# Patient Record
Sex: FEMALE | Race: WHITE | NOT HISPANIC OR LATINO
[De-identification: names, ages, dates, MRNs, and addresses within clinical notes are randomized per-mention and may not be internally consistent; named-entity substitution may affect disease eponyms.]

---

## 2017-05-08 ENCOUNTER — APPOINTMENT (OUTPATIENT)
Dept: OBGYN | Facility: CLINIC | Age: 37
End: 2017-05-08

## 2017-05-08 VITALS
DIASTOLIC BLOOD PRESSURE: 80 MMHG | BODY MASS INDEX: 31.37 KG/M2 | WEIGHT: 207 LBS | SYSTOLIC BLOOD PRESSURE: 139 MMHG | HEIGHT: 68 IN

## 2017-05-10 LAB
C TRACH RRNA SPEC QL NAA+PROBE: NORMAL
N GONORRHOEA RRNA SPEC QL NAA+PROBE: NORMAL
SOURCE AMPLIFICATION: NORMAL

## 2017-05-23 ENCOUNTER — APPOINTMENT (OUTPATIENT)
Dept: OBGYN | Facility: CLINIC | Age: 37
End: 2017-05-23

## 2017-05-23 VITALS
BODY MASS INDEX: 31.22 KG/M2 | HEIGHT: 68 IN | SYSTOLIC BLOOD PRESSURE: 122 MMHG | DIASTOLIC BLOOD PRESSURE: 75 MMHG | WEIGHT: 206 LBS

## 2017-05-24 LAB
BASOPHILS # BLD AUTO: 0.01 K/UL
BASOPHILS NFR BLD AUTO: 0.1 %
EOSINOPHIL # BLD AUTO: 0.15 K/UL
EOSINOPHIL NFR BLD AUTO: 1.4 %
HCT VFR BLD CALC: 41.9 %
HGB BLD-MCNC: 13.9 G/DL
HIV1+2 AB SPEC QL IA.RAPID: NONREACTIVE
IMM GRANULOCYTES NFR BLD AUTO: 0.3 %
LYMPHOCYTES # BLD AUTO: 3.18 K/UL
LYMPHOCYTES NFR BLD AUTO: 30.7 %
MAN DIFF?: NORMAL
MCHC RBC-ENTMCNC: 30 PG
MCHC RBC-ENTMCNC: 33.2 GM/DL
MCV RBC AUTO: 90.3 FL
MONOCYTES # BLD AUTO: 0.76 K/UL
MONOCYTES NFR BLD AUTO: 7.3 %
NEUTROPHILS # BLD AUTO: 6.24 K/UL
NEUTROPHILS NFR BLD AUTO: 60.2 %
PLATELET # BLD AUTO: 352 K/UL
RBC # BLD: 4.64 M/UL
RBC # FLD: 12.7 %
RUBV IGG FLD-ACNC: 1.2 INDEX
RUBV IGG SER-IMP: POSITIVE
WBC # FLD AUTO: 10.37 K/UL

## 2017-05-25 LAB
ABO + RH PNL BLD: NORMAL
BLD GP AB SCN SERPL QL: NORMAL
HBV SURFACE AG SER QL: NONREACTIVE
LEAD BLD-MCNC: 1 UG/DL
RPR SER QL: NORMAL

## 2017-05-26 LAB — BACTERIA UR CULT: ABNORMAL

## 2017-05-30 LAB
HGB A MFR BLD: 97.2 %
HGB A2 MFR BLD: 2.8 %
HGB FRACT BLD-IMP: NORMAL

## 2017-05-31 LAB
AR GENE MUT ANL BLD/T: NORMAL
CFTR MUT TESTED BLD/T: NORMAL
FMR1 GENE MUT ANL BLD/T: NORMAL

## 2017-06-13 ENCOUNTER — APPOINTMENT (OUTPATIENT)
Dept: OBGYN | Facility: CLINIC | Age: 37
End: 2017-06-13

## 2017-06-13 ENCOUNTER — APPOINTMENT (OUTPATIENT)
Dept: ANTEPARTUM | Facility: CLINIC | Age: 37
End: 2017-06-13

## 2017-06-13 VITALS
DIASTOLIC BLOOD PRESSURE: 74 MMHG | SYSTOLIC BLOOD PRESSURE: 129 MMHG | HEIGHT: 68 IN | BODY MASS INDEX: 31.22 KG/M2 | WEIGHT: 206 LBS

## 2017-06-27 ENCOUNTER — APPOINTMENT (OUTPATIENT)
Dept: PEDIATRIC MEDICAL GENETICS | Facility: CLINIC | Age: 37
End: 2017-06-27

## 2017-06-27 ENCOUNTER — LABORATORY RESULT (OUTPATIENT)
Age: 37
End: 2017-06-27

## 2017-07-17 ENCOUNTER — APPOINTMENT (OUTPATIENT)
Dept: OBGYN | Facility: CLINIC | Age: 37
End: 2017-07-17

## 2017-07-17 VITALS
SYSTOLIC BLOOD PRESSURE: 100 MMHG | HEIGHT: 68 IN | BODY MASS INDEX: 31.98 KG/M2 | WEIGHT: 211 LBS | DIASTOLIC BLOOD PRESSURE: 80 MMHG

## 2017-07-17 LAB — FETAL HEART DESCRIPTION: NORMAL

## 2017-07-25 LAB
1ST TRIMESTER DATA: NORMAL
2ND TRIMESTER DATA: NORMAL
AFP PNL SERPL: NORMAL
AFP SERPL-ACNC: NORMAL
AFP SERPL-ACNC: NORMAL
B-HCG FREE SERPL-MCNC: NORMAL
CLINICAL BIOCHEMIST REVIEW: NORMAL
FREE BETA HCG 1ST TRIMESTER: NORMAL
INHIBIN A SERPL-MCNC: NORMAL
NOTES NTD: NORMAL
NT: NORMAL
PAPP-A SERPL-ACNC: NORMAL
U ESTRIOL SERPL-SCNC: NORMAL

## 2017-08-07 ENCOUNTER — APPOINTMENT (OUTPATIENT)
Dept: OBGYN | Facility: CLINIC | Age: 37
End: 2017-08-07
Payer: COMMERCIAL

## 2017-08-07 ENCOUNTER — APPOINTMENT (OUTPATIENT)
Dept: ANTEPARTUM | Facility: CLINIC | Age: 37
End: 2017-08-07
Payer: COMMERCIAL

## 2017-08-07 ENCOUNTER — ASOB RESULT (OUTPATIENT)
Age: 37
End: 2017-08-07

## 2017-08-07 VITALS
SYSTOLIC BLOOD PRESSURE: 119 MMHG | DIASTOLIC BLOOD PRESSURE: 80 MMHG | WEIGHT: 223 LBS | BODY MASS INDEX: 33.8 KG/M2 | HEIGHT: 68 IN

## 2017-08-07 PROCEDURE — 0502F SUBSEQUENT PRENATAL CARE: CPT

## 2017-08-07 PROCEDURE — 76811 OB US DETAILED SNGL FETUS: CPT

## 2017-08-17 ENCOUNTER — APPOINTMENT (OUTPATIENT)
Dept: ANTEPARTUM | Facility: CLINIC | Age: 37
End: 2017-08-17
Payer: COMMERCIAL

## 2017-08-17 ENCOUNTER — ASOB RESULT (OUTPATIENT)
Age: 37
End: 2017-08-17

## 2017-08-17 PROCEDURE — 76816 OB US FOLLOW-UP PER FETUS: CPT

## 2017-09-06 ENCOUNTER — APPOINTMENT (OUTPATIENT)
Dept: OBGYN | Facility: CLINIC | Age: 37
End: 2017-09-06
Payer: COMMERCIAL

## 2017-09-06 ENCOUNTER — LABORATORY RESULT (OUTPATIENT)
Age: 37
End: 2017-09-06

## 2017-09-06 LAB
FETAL HEART DESCRIPTION: NORMAL
FETAL MOVEMENT: PRESENT

## 2017-09-06 PROCEDURE — 0502F SUBSEQUENT PRENATAL CARE: CPT

## 2017-09-07 LAB
BASOPHILS # BLD AUTO: 0.01 K/UL
BASOPHILS NFR BLD AUTO: 0.1 %
EOSINOPHIL # BLD AUTO: 0.11 K/UL
EOSINOPHIL NFR BLD AUTO: 0.8 %
GLUCOSE 1H P 50 G GLC PO SERPL-MCNC: 114 MG/DL
HCT VFR BLD CALC: 38 %
HGB BLD-MCNC: 12.5 G/DL
HIV1+2 AB SPEC QL IA.RAPID: NONREACTIVE
IMM GRANULOCYTES NFR BLD AUTO: 0.9 %
LYMPHOCYTES # BLD AUTO: 3.08 K/UL
LYMPHOCYTES NFR BLD AUTO: 22.4 %
MAN DIFF?: NORMAL
MCHC RBC-ENTMCNC: 30.7 PG
MCHC RBC-ENTMCNC: 32.9 GM/DL
MCV RBC AUTO: 93.4 FL
MONOCYTES # BLD AUTO: 0.92 K/UL
MONOCYTES NFR BLD AUTO: 6.7 %
NEUTROPHILS # BLD AUTO: 9.54 K/UL
NEUTROPHILS NFR BLD AUTO: 69.1 %
PLATELET # BLD AUTO: 278 K/UL
RBC # BLD: 4.07 M/UL
RBC # FLD: 13.1 %
WBC # FLD AUTO: 13.78 K/UL

## 2017-09-14 ENCOUNTER — TRANSCRIPTION ENCOUNTER (OUTPATIENT)
Age: 37
End: 2017-09-14

## 2017-09-18 ENCOUNTER — TRANSCRIPTION ENCOUNTER (OUTPATIENT)
Age: 37
End: 2017-09-18

## 2017-09-27 ENCOUNTER — APPOINTMENT (OUTPATIENT)
Dept: OBGYN | Facility: CLINIC | Age: 37
End: 2017-09-27
Payer: COMMERCIAL

## 2017-09-27 VITALS
WEIGHT: 230 LBS | BODY MASS INDEX: 34.86 KG/M2 | DIASTOLIC BLOOD PRESSURE: 75 MMHG | SYSTOLIC BLOOD PRESSURE: 135 MMHG | HEIGHT: 68 IN

## 2017-09-27 LAB
FETAL HEART DESCRIPTION: NORMAL
FETAL MOVEMENT: PRESENT

## 2017-09-27 PROCEDURE — 0502F SUBSEQUENT PRENATAL CARE: CPT

## 2017-09-27 PROCEDURE — 90471 IMMUNIZATION ADMIN: CPT

## 2017-09-27 PROCEDURE — 90686 IIV4 VACC NO PRSV 0.5 ML IM: CPT

## 2017-10-18 ENCOUNTER — APPOINTMENT (OUTPATIENT)
Dept: OBGYN | Facility: CLINIC | Age: 37
End: 2017-10-18
Payer: COMMERCIAL

## 2017-10-18 VITALS
BODY MASS INDEX: 35.31 KG/M2 | SYSTOLIC BLOOD PRESSURE: 128 MMHG | DIASTOLIC BLOOD PRESSURE: 79 MMHG | WEIGHT: 233 LBS | HEIGHT: 68 IN

## 2017-10-18 LAB
FETAL HEART DESCRIPTION: NORMAL
FETAL MOVEMENT: PRESENT

## 2017-10-18 PROCEDURE — 0502F SUBSEQUENT PRENATAL CARE: CPT

## 2017-11-01 ENCOUNTER — APPOINTMENT (OUTPATIENT)
Dept: OBGYN | Facility: CLINIC | Age: 37
End: 2017-11-01
Payer: COMMERCIAL

## 2017-11-01 VITALS
HEIGHT: 68 IN | SYSTOLIC BLOOD PRESSURE: 124 MMHG | WEIGHT: 235.5 LBS | DIASTOLIC BLOOD PRESSURE: 80 MMHG | BODY MASS INDEX: 35.69 KG/M2

## 2017-11-01 PROCEDURE — 90715 TDAP VACCINE 7 YRS/> IM: CPT

## 2017-11-01 PROCEDURE — 90471 IMMUNIZATION ADMIN: CPT

## 2017-11-01 PROCEDURE — 0502F SUBSEQUENT PRENATAL CARE: CPT

## 2017-11-15 ENCOUNTER — APPOINTMENT (OUTPATIENT)
Dept: OBGYN | Facility: CLINIC | Age: 37
End: 2017-11-15
Payer: COMMERCIAL

## 2017-11-15 VITALS
HEIGHT: 68 IN | WEIGHT: 239.25 LBS | SYSTOLIC BLOOD PRESSURE: 127 MMHG | BODY MASS INDEX: 36.26 KG/M2 | DIASTOLIC BLOOD PRESSURE: 78 MMHG

## 2017-11-15 LAB
FETAL HEART DESCRIPTION: NORMAL
FETAL MOVEMENT: PRESENT

## 2017-11-15 PROCEDURE — 0502F SUBSEQUENT PRENATAL CARE: CPT

## 2017-11-29 ENCOUNTER — ASOB RESULT (OUTPATIENT)
Age: 37
End: 2017-11-29

## 2017-11-29 ENCOUNTER — APPOINTMENT (OUTPATIENT)
Dept: ANTEPARTUM | Facility: CLINIC | Age: 37
End: 2017-11-29
Payer: COMMERCIAL

## 2017-11-29 ENCOUNTER — APPOINTMENT (OUTPATIENT)
Dept: OBGYN | Facility: CLINIC | Age: 37
End: 2017-11-29
Payer: COMMERCIAL

## 2017-11-29 VITALS
BODY MASS INDEX: 37.44 KG/M2 | DIASTOLIC BLOOD PRESSURE: 79 MMHG | SYSTOLIC BLOOD PRESSURE: 128 MMHG | HEIGHT: 68 IN | WEIGHT: 247 LBS

## 2017-11-29 LAB
ADDL FETAL HEART RATE: NORMAL
FETAL HEART DESCRIPTION: NORMAL
FETAL MOVEMENT: PRESENT
FETAL PRESENTATION: NORMAL

## 2017-11-29 PROCEDURE — 76816 OB US FOLLOW-UP PER FETUS: CPT

## 2017-11-29 PROCEDURE — 0502F SUBSEQUENT PRENATAL CARE: CPT

## 2017-12-04 LAB
GP B STREP DNA SPEC QL NAA+PROBE: NORMAL
GP B STREP DNA SPEC QL NAA+PROBE: NOT DETECTED
SOURCE GBS: NORMAL

## 2017-12-06 ENCOUNTER — APPOINTMENT (OUTPATIENT)
Dept: OBGYN | Facility: CLINIC | Age: 37
End: 2017-12-06
Payer: COMMERCIAL

## 2017-12-06 VITALS
HEIGHT: 68 IN | WEIGHT: 249 LBS | SYSTOLIC BLOOD PRESSURE: 130 MMHG | DIASTOLIC BLOOD PRESSURE: 84 MMHG | BODY MASS INDEX: 37.74 KG/M2

## 2017-12-06 LAB
FETAL HEART DESCRIPTION: NORMAL
FETAL MOVEMENT: PRESENT
FETAL PRESENTATION: NORMAL
OB COMMENTS: NORMAL

## 2017-12-06 PROCEDURE — 0502F SUBSEQUENT PRENATAL CARE: CPT

## 2017-12-12 ENCOUNTER — APPOINTMENT (OUTPATIENT)
Dept: OBGYN | Facility: CLINIC | Age: 37
End: 2017-12-12
Payer: COMMERCIAL

## 2017-12-12 VITALS
BODY MASS INDEX: 38.65 KG/M2 | SYSTOLIC BLOOD PRESSURE: 136 MMHG | HEIGHT: 68 IN | WEIGHT: 255 LBS | DIASTOLIC BLOOD PRESSURE: 79 MMHG

## 2017-12-12 PROCEDURE — 0502F SUBSEQUENT PRENATAL CARE: CPT

## 2017-12-20 ENCOUNTER — ASOB RESULT (OUTPATIENT)
Age: 37
End: 2017-12-20

## 2017-12-20 ENCOUNTER — APPOINTMENT (OUTPATIENT)
Dept: ANTEPARTUM | Facility: CLINIC | Age: 37
End: 2017-12-20
Payer: COMMERCIAL

## 2017-12-20 ENCOUNTER — APPOINTMENT (OUTPATIENT)
Dept: OBGYN | Facility: CLINIC | Age: 37
End: 2017-12-20
Payer: COMMERCIAL

## 2017-12-20 VITALS
SYSTOLIC BLOOD PRESSURE: 130 MMHG | WEIGHT: 259.38 LBS | BODY MASS INDEX: 39.31 KG/M2 | DIASTOLIC BLOOD PRESSURE: 82 MMHG | HEIGHT: 68 IN

## 2017-12-20 LAB
CERVICAL DILATION (CM): 0
CERVICAL EFFACEMENT (%): NORMAL
FETAL HEART DESCRIPTION: NORMAL
FETAL MOVEMENT: PRESENT
FETAL PRESENTATION: NORMAL
Lab: -2
OB COMMENTS: NORMAL

## 2017-12-20 PROCEDURE — 76816 OB US FOLLOW-UP PER FETUS: CPT

## 2017-12-20 PROCEDURE — 0502F SUBSEQUENT PRENATAL CARE: CPT

## 2017-12-26 ENCOUNTER — APPOINTMENT (OUTPATIENT)
Dept: OBGYN | Facility: CLINIC | Age: 37
End: 2017-12-26
Payer: COMMERCIAL

## 2017-12-26 ENCOUNTER — INPATIENT (INPATIENT)
Facility: HOSPITAL | Age: 37
LOS: 2 days | Discharge: HOME | End: 2017-12-29
Attending: OBSTETRICS & GYNECOLOGY | Admitting: OBSTETRICS & GYNECOLOGY
Payer: COMMERCIAL

## 2017-12-26 VITALS
DIASTOLIC BLOOD PRESSURE: 82 MMHG | BODY MASS INDEX: 39.74 KG/M2 | HEIGHT: 68 IN | WEIGHT: 262.25 LBS | SYSTOLIC BLOOD PRESSURE: 140 MMHG

## 2017-12-26 VITALS — HEIGHT: 68 IN | WEIGHT: 257.94 LBS

## 2017-12-26 DIAGNOSIS — Z34.80 ENCOUNTER FOR SUPERVISION OF OTHER NORMAL PREGNANCY, UNSPECIFIED TRIMESTER: ICD-10-CM

## 2017-12-26 DIAGNOSIS — Z3A.00 WEEKS OF GESTATION OF PREGNANCY NOT SPECIFIED: ICD-10-CM

## 2017-12-26 DIAGNOSIS — O26.899 OTHER SPECIFIED PREGNANCY RELATED CONDITIONS, UNSPECIFIED TRIMESTER: ICD-10-CM

## 2017-12-26 DIAGNOSIS — Z34.02 ENCOUNTER FOR SUPERVISION OF NORMAL FIRST PREGNANCY, SECOND TRIMESTER: ICD-10-CM

## 2017-12-26 LAB
ALBUMIN SERPL ELPH-MCNC: 2.7 G/DL — LOW (ref 3.3–5)
ALP SERPL-CCNC: 127 U/L — HIGH (ref 40–120)
ALT FLD-CCNC: 15 U/L RC — SIGNIFICANT CHANGE UP (ref 10–45)
ANION GAP SERPL CALC-SCNC: 13 MMOL/L — SIGNIFICANT CHANGE UP (ref 5–17)
APPEARANCE UR: ABNORMAL
APTT BLD: 24.3 SEC — LOW (ref 27.5–37.4)
AST SERPL-CCNC: 19 U/L — SIGNIFICANT CHANGE UP (ref 10–40)
BACTERIA # UR AUTO: NEGATIVE — SIGNIFICANT CHANGE UP
BASOPHILS # BLD AUTO: 0 K/UL — SIGNIFICANT CHANGE UP (ref 0–0.2)
BASOPHILS NFR BLD AUTO: 0.2 % — SIGNIFICANT CHANGE UP (ref 0–2)
BILIRUB SERPL-MCNC: 0.2 MG/DL — SIGNIFICANT CHANGE UP (ref 0.2–1.2)
BILIRUB UR-MCNC: NEGATIVE — SIGNIFICANT CHANGE UP
BLD GP AB SCN SERPL QL: NEGATIVE — SIGNIFICANT CHANGE UP
BUN SERPL-MCNC: 14 MG/DL — SIGNIFICANT CHANGE UP (ref 7–23)
CALCIUM SERPL-MCNC: 10 MG/DL — SIGNIFICANT CHANGE UP (ref 8.4–10.5)
CHLORIDE SERPL-SCNC: 102 MMOL/L — SIGNIFICANT CHANGE UP (ref 96–108)
CO2 SERPL-SCNC: 20 MMOL/L — LOW (ref 22–31)
COLOR SPEC: YELLOW — SIGNIFICANT CHANGE UP
CREAT SERPL-MCNC: 0.87 MG/DL — SIGNIFICANT CHANGE UP (ref 0.5–1.3)
DIFF PNL FLD: NEGATIVE — SIGNIFICANT CHANGE UP
EOSINOPHIL # BLD AUTO: 0.1 K/UL — SIGNIFICANT CHANGE UP (ref 0–0.5)
EOSINOPHIL NFR BLD AUTO: 0.6 % — SIGNIFICANT CHANGE UP (ref 0–6)
EPI CELLS # UR: 4 /HPF — SIGNIFICANT CHANGE UP (ref 0–5)
FIBRINOGEN PPP-MCNC: 601 MG/DL — HIGH (ref 310–510)
GLUCOSE SERPL-MCNC: 139 MG/DL — HIGH (ref 70–99)
GLUCOSE UR QL: NEGATIVE MG/DL — SIGNIFICANT CHANGE UP
HCT VFR BLD CALC: 36 % — SIGNIFICANT CHANGE UP (ref 34.5–45)
HGB BLD-MCNC: 12.6 G/DL — SIGNIFICANT CHANGE UP (ref 11.5–15.5)
INR BLD: 0.88 RATIO — SIGNIFICANT CHANGE UP (ref 0.88–1.16)
KETONES UR-MCNC: NEGATIVE — SIGNIFICANT CHANGE UP
LDH SERPL L TO P-CCNC: 210 U/L — SIGNIFICANT CHANGE UP (ref 50–242)
LDH SERPL L TO P-CCNC: 283 U/L — HIGH (ref 50–242)
LEUKOCYTE ESTERASE UR-ACNC: NEGATIVE — SIGNIFICANT CHANGE UP
LYMPHOCYTES # BLD AUTO: 18.9 % — SIGNIFICANT CHANGE UP (ref 13–44)
LYMPHOCYTES # BLD AUTO: 2.8 K/UL — SIGNIFICANT CHANGE UP (ref 1–3.3)
MCHC RBC-ENTMCNC: 31.7 PG — SIGNIFICANT CHANGE UP (ref 27–34)
MCHC RBC-ENTMCNC: 35 GM/DL — SIGNIFICANT CHANGE UP (ref 32–36)
MCV RBC AUTO: 90.6 FL — SIGNIFICANT CHANGE UP (ref 80–100)
MONOCYTES # BLD AUTO: 0.7 K/UL — SIGNIFICANT CHANGE UP (ref 0–0.9)
MONOCYTES NFR BLD AUTO: 5 % — SIGNIFICANT CHANGE UP (ref 2–14)
NEUTROPHILS # BLD AUTO: 11.1 K/UL — HIGH (ref 1.8–7.4)
NEUTROPHILS NFR BLD AUTO: 75.3 % — SIGNIFICANT CHANGE UP (ref 43–77)
NITRITE UR-MCNC: NEGATIVE — SIGNIFICANT CHANGE UP
PH UR: 6 — SIGNIFICANT CHANGE UP (ref 5–8)
PLATELET # BLD AUTO: 221 K/UL — SIGNIFICANT CHANGE UP (ref 150–400)
POTASSIUM SERPL-MCNC: 3.9 MMOL/L — SIGNIFICANT CHANGE UP (ref 3.5–5.3)
POTASSIUM SERPL-SCNC: 3.9 MMOL/L — SIGNIFICANT CHANGE UP (ref 3.5–5.3)
PROT SERPL-MCNC: 5.9 G/DL — LOW (ref 6–8.3)
PROT UR-MCNC: ABNORMAL MG/DL
PROTHROM AB SERPL-ACNC: 9.6 SEC — LOW (ref 9.8–12.7)
RBC # BLD: 3.97 M/UL — SIGNIFICANT CHANGE UP (ref 3.8–5.2)
RBC # FLD: 12.1 % — SIGNIFICANT CHANGE UP (ref 10.3–14.5)
RBC CASTS # UR COMP ASSIST: 2 /HPF — SIGNIFICANT CHANGE UP (ref 0–4)
RH IG SCN BLD-IMP: POSITIVE — SIGNIFICANT CHANGE UP
RH IG SCN BLD-IMP: POSITIVE — SIGNIFICANT CHANGE UP
SODIUM SERPL-SCNC: 135 MMOL/L — SIGNIFICANT CHANGE UP (ref 135–145)
SP GR SPEC: 1.02 — SIGNIFICANT CHANGE UP (ref 1.01–1.02)
URATE SERPL-MCNC: 5.7 MG/DL — SIGNIFICANT CHANGE UP (ref 2.5–7)
UROBILINOGEN FLD QL: NEGATIVE MG/DL — SIGNIFICANT CHANGE UP
WBC # BLD: 14.7 K/UL — HIGH (ref 3.8–10.5)
WBC # FLD AUTO: 14.7 K/UL — HIGH (ref 3.8–10.5)
WBC UR QL: 2 /HPF — SIGNIFICANT CHANGE UP (ref 0–5)

## 2017-12-26 PROCEDURE — 0502F SUBSEQUENT PRENATAL CARE: CPT

## 2017-12-26 RX ORDER — CITRIC ACID/SODIUM CITRATE 300-500 MG
15 SOLUTION, ORAL ORAL EVERY 4 HOURS
Qty: 0 | Refills: 0 | Status: DISCONTINUED | OUTPATIENT
Start: 2017-12-26 | End: 2017-12-28

## 2017-12-26 RX ORDER — SODIUM CHLORIDE 9 MG/ML
1000 INJECTION, SOLUTION INTRAVENOUS
Qty: 0 | Refills: 0 | Status: DISCONTINUED | OUTPATIENT
Start: 2017-12-26 | End: 2017-12-28

## 2017-12-26 RX ORDER — OXYTOCIN 10 UNIT/ML
333.33 VIAL (ML) INJECTION
Qty: 20 | Refills: 0 | Status: DISCONTINUED | OUTPATIENT
Start: 2017-12-26 | End: 2017-12-28

## 2017-12-26 RX ORDER — SODIUM CHLORIDE 9 MG/ML
500 INJECTION, SOLUTION INTRAVENOUS ONCE
Qty: 0 | Refills: 0 | Status: COMPLETED | OUTPATIENT
Start: 2017-12-26 | End: 2017-12-26

## 2017-12-26 RX ADMIN — SODIUM CHLORIDE 1000 MILLILITER(S): 9 INJECTION, SOLUTION INTRAVENOUS at 18:44

## 2017-12-26 RX ADMIN — SODIUM CHLORIDE 125 MILLILITER(S): 9 INJECTION, SOLUTION INTRAVENOUS at 19:30

## 2017-12-27 ENCOUNTER — RESULT REVIEW (OUTPATIENT)
Age: 37
End: 2017-12-27

## 2017-12-27 ENCOUNTER — TRANSCRIPTION ENCOUNTER (OUTPATIENT)
Age: 37
End: 2017-12-27

## 2017-12-27 LAB
ALBUMIN SERPL ELPH-MCNC: 3 G/DL — LOW (ref 3.3–5)
ALP SERPL-CCNC: 133 U/L — HIGH (ref 40–120)
ALT FLD-CCNC: 15 U/L RC — SIGNIFICANT CHANGE UP (ref 10–45)
ANION GAP SERPL CALC-SCNC: 13 MMOL/L — SIGNIFICANT CHANGE UP (ref 5–17)
APTT BLD: 25.1 SEC — LOW (ref 27.5–37.4)
AST SERPL-CCNC: 18 U/L — SIGNIFICANT CHANGE UP (ref 10–40)
BASOPHILS # BLD AUTO: 0.1 K/UL — SIGNIFICANT CHANGE UP (ref 0–0.2)
BASOPHILS NFR BLD AUTO: 0.4 % — SIGNIFICANT CHANGE UP (ref 0–2)
BILIRUB SERPL-MCNC: 0.3 MG/DL — SIGNIFICANT CHANGE UP (ref 0.2–1.2)
BUN SERPL-MCNC: 13 MG/DL — SIGNIFICANT CHANGE UP (ref 7–23)
CALCIUM SERPL-MCNC: 9.5 MG/DL — SIGNIFICANT CHANGE UP (ref 8.4–10.5)
CHLORIDE SERPL-SCNC: 103 MMOL/L — SIGNIFICANT CHANGE UP (ref 96–108)
CO2 SERPL-SCNC: 21 MMOL/L — LOW (ref 22–31)
CREAT ?TM UR-MCNC: 83 MG/DL — SIGNIFICANT CHANGE UP
CREAT SERPL-MCNC: 0.81 MG/DL — SIGNIFICANT CHANGE UP (ref 0.5–1.3)
EOSINOPHIL # BLD AUTO: 0.2 K/UL — SIGNIFICANT CHANGE UP (ref 0–0.5)
EOSINOPHIL NFR BLD AUTO: 1.1 % — SIGNIFICANT CHANGE UP (ref 0–6)
FIBRINOGEN PPP-MCNC: 640 MG/DL — HIGH (ref 310–510)
GLUCOSE SERPL-MCNC: 86 MG/DL — SIGNIFICANT CHANGE UP (ref 70–99)
HCT VFR BLD CALC: 36.8 % — SIGNIFICANT CHANGE UP (ref 34.5–45)
HGB BLD-MCNC: 13 G/DL — SIGNIFICANT CHANGE UP (ref 11.5–15.5)
INR BLD: 0.91 RATIO — SIGNIFICANT CHANGE UP (ref 0.88–1.16)
LYMPHOCYTES # BLD AUTO: 28.2 % — SIGNIFICANT CHANGE UP (ref 13–44)
LYMPHOCYTES # BLD AUTO: 4.4 K/UL — HIGH (ref 1–3.3)
MCHC RBC-ENTMCNC: 32.2 PG — SIGNIFICANT CHANGE UP (ref 27–34)
MCHC RBC-ENTMCNC: 35.4 GM/DL — SIGNIFICANT CHANGE UP (ref 32–36)
MCV RBC AUTO: 91.1 FL — SIGNIFICANT CHANGE UP (ref 80–100)
MONOCYTES # BLD AUTO: 1.1 K/UL — HIGH (ref 0–0.9)
MONOCYTES NFR BLD AUTO: 6.7 % — SIGNIFICANT CHANGE UP (ref 2–14)
NEUTROPHILS # BLD AUTO: 10 K/UL — HIGH (ref 1.8–7.4)
NEUTROPHILS NFR BLD AUTO: 63.6 % — SIGNIFICANT CHANGE UP (ref 43–77)
PLATELET # BLD AUTO: 235 K/UL — SIGNIFICANT CHANGE UP (ref 150–400)
POTASSIUM SERPL-MCNC: 3.8 MMOL/L — SIGNIFICANT CHANGE UP (ref 3.5–5.3)
POTASSIUM SERPL-SCNC: 3.8 MMOL/L — SIGNIFICANT CHANGE UP (ref 3.5–5.3)
PROT ?TM UR-MCNC: 18 MG/DL — HIGH (ref 0–12)
PROT SERPL-MCNC: 6.1 G/DL — SIGNIFICANT CHANGE UP (ref 6–8.3)
PROT/CREAT UR-RTO: 0.2 RATIO — SIGNIFICANT CHANGE UP (ref 0–0.2)
PROTHROM AB SERPL-ACNC: 9.9 SEC — SIGNIFICANT CHANGE UP (ref 9.8–12.7)
RBC # BLD: 4.04 M/UL — SIGNIFICANT CHANGE UP (ref 3.8–5.2)
RBC # FLD: 12.4 % — SIGNIFICANT CHANGE UP (ref 10.3–14.5)
SODIUM SERPL-SCNC: 137 MMOL/L — SIGNIFICANT CHANGE UP (ref 135–145)
T PALLIDUM AB TITR SER: NEGATIVE — SIGNIFICANT CHANGE UP
URATE SERPL-MCNC: 6.1 MG/DL — SIGNIFICANT CHANGE UP (ref 2.5–7)
WBC # BLD: 15.7 K/UL — HIGH (ref 3.8–10.5)
WBC # FLD AUTO: 15.7 K/UL — HIGH (ref 3.8–10.5)

## 2017-12-27 PROCEDURE — 59400 OBSTETRICAL CARE: CPT

## 2017-12-27 RX ORDER — DIBUCAINE 1 %
1 OINTMENT (GRAM) RECTAL EVERY 4 HOURS
Qty: 0 | Refills: 0 | Status: DISCONTINUED | OUTPATIENT
Start: 2017-12-28 | End: 2017-12-29

## 2017-12-27 RX ORDER — OXYCODONE HYDROCHLORIDE 5 MG/1
5 TABLET ORAL EVERY 4 HOURS
Qty: 0 | Refills: 0 | Status: DISCONTINUED | OUTPATIENT
Start: 2017-12-27 | End: 2017-12-29

## 2017-12-27 RX ORDER — DIBUCAINE 1 %
1 OINTMENT (GRAM) RECTAL EVERY 4 HOURS
Qty: 0 | Refills: 0 | Status: DISCONTINUED | OUTPATIENT
Start: 2017-12-27 | End: 2017-12-28

## 2017-12-27 RX ORDER — ACETAMINOPHEN 500 MG
975 TABLET ORAL EVERY 6 HOURS
Qty: 0 | Refills: 0 | Status: COMPLETED | OUTPATIENT
Start: 2017-12-27 | End: 2018-11-25

## 2017-12-27 RX ORDER — OXYTOCIN 10 UNIT/ML
4 VIAL (ML) INJECTION
Qty: 30 | Refills: 0 | Status: DISCONTINUED | OUTPATIENT
Start: 2017-12-27 | End: 2017-12-27

## 2017-12-27 RX ORDER — OXYTOCIN 10 UNIT/ML
41.67 VIAL (ML) INJECTION
Qty: 20 | Refills: 0 | Status: DISCONTINUED | OUTPATIENT
Start: 2017-12-28 | End: 2017-12-29

## 2017-12-27 RX ORDER — AER TRAVELER 0.5 G/1
1 SOLUTION RECTAL; TOPICAL EVERY 4 HOURS
Qty: 0 | Refills: 0 | Status: DISCONTINUED | OUTPATIENT
Start: 2017-12-27 | End: 2017-12-28

## 2017-12-27 RX ORDER — PRAMOXINE HYDROCHLORIDE 150 MG/15G
1 AEROSOL, FOAM RECTAL EVERY 4 HOURS
Qty: 0 | Refills: 0 | Status: DISCONTINUED | OUTPATIENT
Start: 2017-12-27 | End: 2017-12-28

## 2017-12-27 RX ORDER — SODIUM CHLORIDE 9 MG/ML
1000 INJECTION, SOLUTION INTRAVENOUS
Qty: 0 | Refills: 0 | Status: DISCONTINUED | OUTPATIENT
Start: 2017-12-27 | End: 2017-12-29

## 2017-12-27 RX ORDER — OXYTOCIN 10 UNIT/ML
41.67 VIAL (ML) INJECTION
Qty: 20 | Refills: 0 | Status: DISCONTINUED | OUTPATIENT
Start: 2017-12-27 | End: 2017-12-28

## 2017-12-27 RX ORDER — TETANUS TOXOID, REDUCED DIPHTHERIA TOXOID AND ACELLULAR PERTUSSIS VACCINE, ADSORBED 5; 2.5; 8; 8; 2.5 [IU]/.5ML; [IU]/.5ML; UG/.5ML; UG/.5ML; UG/.5ML
0.5 SUSPENSION INTRAMUSCULAR ONCE
Qty: 0 | Refills: 0 | Status: DISCONTINUED | OUTPATIENT
Start: 2017-12-28 | End: 2017-12-29

## 2017-12-27 RX ORDER — PRAMOXINE HYDROCHLORIDE 150 MG/15G
1 AEROSOL, FOAM RECTAL EVERY 4 HOURS
Qty: 0 | Refills: 0 | Status: DISCONTINUED | OUTPATIENT
Start: 2017-12-28 | End: 2017-12-29

## 2017-12-27 RX ORDER — SODIUM CHLORIDE 9 MG/ML
3 INJECTION INTRAMUSCULAR; INTRAVENOUS; SUBCUTANEOUS EVERY 8 HOURS
Qty: 0 | Refills: 0 | Status: DISCONTINUED | OUTPATIENT
Start: 2017-12-27 | End: 2017-12-28

## 2017-12-27 RX ORDER — DIPHENHYDRAMINE HCL 50 MG
25 CAPSULE ORAL EVERY 6 HOURS
Qty: 0 | Refills: 0 | Status: DISCONTINUED | OUTPATIENT
Start: 2017-12-28 | End: 2017-12-29

## 2017-12-27 RX ORDER — DOCUSATE SODIUM 100 MG
100 CAPSULE ORAL
Qty: 0 | Refills: 0 | Status: DISCONTINUED | OUTPATIENT
Start: 2017-12-28 | End: 2017-12-29

## 2017-12-27 RX ORDER — KETOROLAC TROMETHAMINE 30 MG/ML
30 SYRINGE (ML) INJECTION ONCE
Qty: 0 | Refills: 0 | Status: DISCONTINUED | OUTPATIENT
Start: 2017-12-27 | End: 2017-12-28

## 2017-12-27 RX ORDER — LABETALOL HCL 100 MG
20 TABLET ORAL ONCE
Qty: 0 | Refills: 0 | Status: DISCONTINUED | OUTPATIENT
Start: 2017-12-27 | End: 2017-12-29

## 2017-12-27 RX ORDER — IBUPROFEN 200 MG
600 TABLET ORAL EVERY 6 HOURS
Qty: 0 | Refills: 0 | Status: COMPLETED | OUTPATIENT
Start: 2017-12-27 | End: 2018-11-25

## 2017-12-27 RX ORDER — SIMETHICONE 80 MG/1
80 TABLET, CHEWABLE ORAL EVERY 6 HOURS
Qty: 0 | Refills: 0 | Status: DISCONTINUED | OUTPATIENT
Start: 2017-12-28 | End: 2017-12-29

## 2017-12-27 RX ORDER — MAGNESIUM HYDROXIDE 400 MG/1
30 TABLET, CHEWABLE ORAL
Qty: 0 | Refills: 0 | Status: DISCONTINUED | OUTPATIENT
Start: 2017-12-28 | End: 2017-12-29

## 2017-12-27 RX ORDER — OXYCODONE HYDROCHLORIDE 5 MG/1
5 TABLET ORAL
Qty: 0 | Refills: 0 | Status: DISCONTINUED | OUTPATIENT
Start: 2017-12-27 | End: 2017-12-29

## 2017-12-27 RX ORDER — OXYTOCIN 10 UNIT/ML
4 VIAL (ML) INJECTION
Qty: 30 | Refills: 0 | Status: DISCONTINUED | OUTPATIENT
Start: 2017-12-27 | End: 2017-12-29

## 2017-12-27 RX ORDER — LANOLIN
1 OINTMENT (GRAM) TOPICAL EVERY 6 HOURS
Qty: 0 | Refills: 0 | Status: DISCONTINUED | OUTPATIENT
Start: 2017-12-28 | End: 2017-12-29

## 2017-12-27 RX ORDER — AER TRAVELER 0.5 G/1
1 SOLUTION RECTAL; TOPICAL EVERY 4 HOURS
Qty: 0 | Refills: 0 | Status: DISCONTINUED | OUTPATIENT
Start: 2017-12-28 | End: 2017-12-29

## 2017-12-27 RX ORDER — SODIUM CHLORIDE 9 MG/ML
3 INJECTION INTRAMUSCULAR; INTRAVENOUS; SUBCUTANEOUS EVERY 8 HOURS
Qty: 0 | Refills: 0 | Status: DISCONTINUED | OUTPATIENT
Start: 2017-12-28 | End: 2017-12-29

## 2017-12-27 RX ORDER — HYDROCORTISONE 1 %
1 OINTMENT (GRAM) TOPICAL EVERY 4 HOURS
Qty: 0 | Refills: 0 | Status: DISCONTINUED | OUTPATIENT
Start: 2017-12-28 | End: 2017-12-29

## 2017-12-27 RX ORDER — GLYCERIN ADULT
1 SUPPOSITORY, RECTAL RECTAL AT BEDTIME
Qty: 0 | Refills: 0 | Status: DISCONTINUED | OUTPATIENT
Start: 2017-12-28 | End: 2017-12-29

## 2017-12-27 RX ORDER — HYDROCORTISONE 1 %
1 OINTMENT (GRAM) TOPICAL EVERY 4 HOURS
Qty: 0 | Refills: 0 | Status: DISCONTINUED | OUTPATIENT
Start: 2017-12-27 | End: 2017-12-28

## 2017-12-27 RX ADMIN — Medication 4 MILLIUNIT(S)/MIN: at 12:30

## 2017-12-28 LAB
BASOPHILS # BLD AUTO: 0 K/UL — SIGNIFICANT CHANGE UP (ref 0–0.2)
BASOPHILS NFR BLD AUTO: 0.1 % — SIGNIFICANT CHANGE UP (ref 0–2)
EOSINOPHIL # BLD AUTO: 0 K/UL — SIGNIFICANT CHANGE UP (ref 0–0.5)
EOSINOPHIL NFR BLD AUTO: 0.1 % — SIGNIFICANT CHANGE UP (ref 0–6)
GIANT PLATELETS BLD QL SMEAR: PRESENT — SIGNIFICANT CHANGE UP
HCT VFR BLD CALC: 31.3 % — LOW (ref 34.5–45)
HCT VFR BLD CALC: 34.8 % — SIGNIFICANT CHANGE UP (ref 34.5–45)
HGB BLD-MCNC: 10.8 G/DL — LOW (ref 11.5–15.5)
HGB BLD-MCNC: 11.7 G/DL — SIGNIFICANT CHANGE UP (ref 11.5–15.5)
LYMPHOCYTES # BLD AUTO: 3 K/UL — SIGNIFICANT CHANGE UP (ref 1–3.3)
LYMPHOCYTES # BLD AUTO: 9.5 % — LOW (ref 13–44)
MCHC RBC-ENTMCNC: 30.7 PG — SIGNIFICANT CHANGE UP (ref 27–34)
MCHC RBC-ENTMCNC: 31.4 PG — SIGNIFICANT CHANGE UP (ref 27–34)
MCHC RBC-ENTMCNC: 33.5 GM/DL — SIGNIFICANT CHANGE UP (ref 32–36)
MCHC RBC-ENTMCNC: 34.5 GM/DL — SIGNIFICANT CHANGE UP (ref 32–36)
MCV RBC AUTO: 91.2 FL — SIGNIFICANT CHANGE UP (ref 80–100)
MCV RBC AUTO: 91.6 FL — SIGNIFICANT CHANGE UP (ref 80–100)
MONOCYTES # BLD AUTO: 1.6 K/UL — HIGH (ref 0–0.9)
MONOCYTES NFR BLD AUTO: 5.1 % — SIGNIFICANT CHANGE UP (ref 2–14)
NEUTROPHILS # BLD AUTO: 26.8 K/UL — HIGH (ref 1.8–7.4)
NEUTROPHILS NFR BLD AUTO: 85.3 % — HIGH (ref 43–77)
PLAT MORPH BLD: ABNORMAL
PLATELET # BLD AUTO: 219 K/UL — SIGNIFICANT CHANGE UP (ref 150–400)
PLATELET # BLD AUTO: 292 K/UL — SIGNIFICANT CHANGE UP (ref 150–400)
RBC # BLD: 3.43 M/UL — LOW (ref 3.8–5.2)
RBC # BLD: 3.81 M/UL — SIGNIFICANT CHANGE UP (ref 3.8–5.2)
RBC # FLD: 12.5 % — SIGNIFICANT CHANGE UP (ref 10.3–14.5)
RBC # FLD: 12.5 % — SIGNIFICANT CHANGE UP (ref 10.3–14.5)
RBC BLD AUTO: SIGNIFICANT CHANGE UP
WBC # BLD: 25.5 K/UL — HIGH (ref 3.8–10.5)
WBC # BLD: 31.4 K/UL — HIGH (ref 3.8–10.5)
WBC # FLD AUTO: 25.5 K/UL — HIGH (ref 3.8–10.5)
WBC # FLD AUTO: 31.4 K/UL — HIGH (ref 3.8–10.5)

## 2017-12-28 RX ORDER — IBUPROFEN 200 MG
600 TABLET ORAL EVERY 6 HOURS
Qty: 0 | Refills: 0 | Status: DISCONTINUED | OUTPATIENT
Start: 2017-12-28 | End: 2017-12-29

## 2017-12-28 RX ORDER — ACETAMINOPHEN 500 MG
975 TABLET ORAL EVERY 6 HOURS
Qty: 0 | Refills: 0 | Status: DISCONTINUED | OUTPATIENT
Start: 2017-12-28 | End: 2017-12-29

## 2017-12-28 RX ADMIN — Medication 30 MILLIGRAM(S): at 01:02

## 2017-12-28 RX ADMIN — Medication 600 MILLIGRAM(S): at 18:10

## 2017-12-28 RX ADMIN — Medication 125 MILLIUNIT(S)/MIN: at 01:24

## 2017-12-28 RX ADMIN — Medication 600 MILLIGRAM(S): at 13:07

## 2017-12-28 RX ADMIN — Medication 600 MILLIGRAM(S): at 17:40

## 2017-12-28 RX ADMIN — Medication 600 MILLIGRAM(S): at 13:30

## 2017-12-28 RX ADMIN — Medication 1 TABLET(S): at 11:15

## 2017-12-28 NOTE — PROGRESS NOTE ADULT - PROBLEM SELECTOR PLAN 1
- Pain well controlled, continue current pain regimen  - Increase ambulation, SCDs when not ambulating  - Continue regular diet    Lisa Gonzalez D.O. PGY1

## 2017-12-28 NOTE — LACTATION INITIAL EVALUATION - LACTATION INTERVENTIONS
initiate skin to skin/initiate dual electric pump routine/initiate hand expression routine/fullterm breastfeeding guidelines reviewed. supplementation with EHM encouraged.

## 2017-12-28 NOTE — PROGRESS NOTE ADULT - SUBJECTIVE AND OBJECTIVE BOX
OB Progress Note:  PPD#1    S: 38yo PPD#1 s/p VAVD. Patient feels well. Pain is well controlled. She is tolerating a regular diet and not yet passing flatus. She is voiding spontaneously, and ambulating. Denies CP/SOB. Denies lightheadedness/dizziness. Denies N/V.    O:  Vitals:  Vital Signs Last 24 Hrs  T(C): 36.4 (28 Dec 2017 03:07), Max: 36.6 (28 Dec 2017 00:30)  T(F): 97.5 (28 Dec 2017 03:07), Max: 97.9 (28 Dec 2017 00:30)  HR: 86 (28 Dec 2017 03:07) (86 - 108)  BP: 132/82 (28 Dec 2017 03:07) (120/59 - 143/63)  BP(mean): --  RR: 18 (28 Dec 2017 03:07) (17 - 18)  SpO2: 95% (28 Dec 2017 02:30) (94% - 97%)    MEDICATIONS  (STANDING):  acetaminophen   Tablet. 975 milliGRAM(s) Oral every 6 hours  dextrose 5% + lactated ringers. 1000 milliLiter(s) (125 mL/Hr) IV Continuous <Continuous>  diphtheria/tetanus/pertussis (acellular) Vaccine (ADAcel) 0.5 milliLiter(s) IntraMuscular once  ibuprofen  Tablet 600 milliGRAM(s) Oral every 6 hours  labetalol Injectable 20 milliGRAM(s) IV Push once  misoprostol Oral Solution 60 MICROGram(s) Oral every 2 hours  oxyCODONE    IR 5 milliGRAM(s) Oral every 3 hours  oxytocin Infusion 41.667 milliUNIT(s)/Min (125 mL/Hr) IV Continuous <Continuous>  oxytocin Infusion 4 milliUNIT(s)/Min (4 mL/Hr) IV Continuous <Continuous>  prenatal multivitamin 1 Tablet(s) Oral daily  sodium chloride 0.9% lock flush 3 milliLiter(s) IV Push every 8 hours      Labs:  Blood type: O Positive  Rubella IgG: RPR: Negative                          13.0   15.7<H> >-----------< 235    ( 27 @ 03:59 )             36.8                        12.6   14.7<H> >-----------< 221    ( 1226 @ 17:14 )             36.0    17 @ 03:59      137  |  103  |  13  ----------------------------<  86  3.8   |  21<L>  |  0.81    17 @ 17:14      135  |  102  |  14  ----------------------------<  139<H>  3.9   |  20<L>  |  0.87        Ca    9.5      27 Dec 2017 03:59  Ca    10.0      26 Dec 2017 17:14    TPro  6.1  /  Alb  3.0<L>  /  TBili  0.3  /  DBili  x   /  AST  18  /  ALT  15  /  AlkPhos  133<H>  17 @ 03:59  TPro  5.9<L>  /  Alb  2.7<L>  /  TBili  0.2  /  DBili  x   /  AST  19  /  ALT  15  /  AlkPhos  127<H>  17 @ 17:14          Physical Exam:  General: NAD  Abdomen: soft, non-tender, non-distended, fundus firm  Vaginal: Lochia wnl  Extremities: NTBL

## 2017-12-28 NOTE — LACTATION INITIAL EVALUATION - INTERVENTION OUTCOME
verbalizes understanding/demonstrates understanding of teaching/good return demonstration/needs met/LC team to follow

## 2017-12-28 NOTE — PROGRESS NOTE ADULT - ATTENDING COMMENTS
Patient seen and examined.  No problems   Will check WBC later this afternoon to see trend  No signs of fever

## 2017-12-29 ENCOUNTER — TRANSCRIPTION ENCOUNTER (OUTPATIENT)
Age: 37
End: 2017-12-29

## 2017-12-29 VITALS
SYSTOLIC BLOOD PRESSURE: 139 MMHG | RESPIRATION RATE: 18 BRPM | TEMPERATURE: 98 F | DIASTOLIC BLOOD PRESSURE: 78 MMHG | HEART RATE: 80 BPM

## 2017-12-29 PROCEDURE — 85384 FIBRINOGEN ACTIVITY: CPT

## 2017-12-29 PROCEDURE — 81001 URINALYSIS AUTO W/SCOPE: CPT

## 2017-12-29 PROCEDURE — G0463: CPT

## 2017-12-29 PROCEDURE — 86901 BLOOD TYPING SEROLOGIC RH(D): CPT

## 2017-12-29 PROCEDURE — 84156 ASSAY OF PROTEIN URINE: CPT

## 2017-12-29 PROCEDURE — 86900 BLOOD TYPING SEROLOGIC ABO: CPT

## 2017-12-29 PROCEDURE — 85730 THROMBOPLASTIN TIME PARTIAL: CPT

## 2017-12-29 PROCEDURE — 85610 PROTHROMBIN TIME: CPT

## 2017-12-29 PROCEDURE — 86850 RBC ANTIBODY SCREEN: CPT

## 2017-12-29 PROCEDURE — 59025 FETAL NON-STRESS TEST: CPT

## 2017-12-29 PROCEDURE — 86780 TREPONEMA PALLIDUM: CPT

## 2017-12-29 PROCEDURE — 80053 COMPREHEN METABOLIC PANEL: CPT

## 2017-12-29 PROCEDURE — 84550 ASSAY OF BLOOD/URIC ACID: CPT

## 2017-12-29 PROCEDURE — 83615 LACTATE (LD) (LDH) ENZYME: CPT

## 2017-12-29 PROCEDURE — 59050 FETAL MONITOR W/REPORT: CPT

## 2017-12-29 PROCEDURE — 85027 COMPLETE CBC AUTOMATED: CPT

## 2017-12-29 RX ORDER — ACETAMINOPHEN 500 MG
3 TABLET ORAL
Qty: 0 | Refills: 0 | DISCHARGE
Start: 2017-12-29

## 2017-12-29 RX ORDER — IBUPROFEN 200 MG
1 TABLET ORAL
Qty: 0 | Refills: 0 | DISCHARGE
Start: 2017-12-29

## 2017-12-29 RX ADMIN — Medication 600 MILLIGRAM(S): at 11:44

## 2017-12-29 RX ADMIN — Medication 1 TABLET(S): at 11:44

## 2017-12-29 RX ADMIN — Medication 600 MILLIGRAM(S): at 17:16

## 2017-12-29 RX ADMIN — Medication 600 MILLIGRAM(S): at 06:01

## 2017-12-29 RX ADMIN — Medication 600 MILLIGRAM(S): at 05:31

## 2017-12-29 RX ADMIN — Medication 600 MILLIGRAM(S): at 12:30

## 2017-12-29 NOTE — DISCHARGE NOTE OB - CARE PLAN
Principal Discharge DX:	Vacuum extractor delivery, delivered  Goal:	recovery  Instructions for follow-up, activity and diet:	Nothing in vagina x 6 weeks

## 2017-12-29 NOTE — DISCHARGE NOTE OB - CARE PROVIDER_API CALL
Celina Wilson (MD), Obstetrics and Gynecology  [address] [place] [address]  Phone: [phone]  Fax: [phone]

## 2017-12-29 NOTE — DISCHARGE NOTE OB - MEDICATION SUMMARY - MEDICATIONS TO TAKE
I will START or STAY ON the medications listed below when I get home from the hospital:    acetaminophen 325 mg oral tablet  -- 3 tab(s) by mouth every 6 hours  -- Indication: For pain    ibuprofen 600 mg oral tablet  -- 1 tab(s) by mouth every 6 hours  -- Indication: For pain    Prenatal Multivitamins with Folic Acid 1 mg oral tablet  -- 1 tab(s) by mouth once a day  -- Indication: For breastfeeding

## 2017-12-29 NOTE — DISCHARGE NOTE OB - PATIENT PORTAL LINK FT
“You can access the Follow[employer] Patient Portal, offered by [facility], by registering with the following website: [email or web address]”

## 2017-12-29 NOTE — PROGRESS NOTE ADULT - PROBLEM SELECTOR PLAN 1
- Pain well controlled, continue current pain regimen  - Increase ambulation, SCDs when not ambulating  - Continue regular diet    Kenna Peres PGY-1

## 2017-12-29 NOTE — PROGRESS NOTE ADULT - SUBJECTIVE AND OBJECTIVE BOX
OB Progress Note: VAVD PPD#2    S: 38yo PPD#2 s/p VAVD. Patient feels well. Pain is well controlled. She is tolerating a regular diet and passing flatus. She is voiding spontaneously, and ambulating without difficulty. She is breastfeeding. Denies CP/SOB. Denies lightheadedness/dizziness. Denies N/V.    O:  Vitals:  Vital Signs Last 24 Hrs  T(C): 36.9 (29 Dec 2017 05:00), Max: 37.1 (28 Dec 2017 17:04)  T(F): 98.4 (29 Dec 2017 05:00), Max: 98.8 (28 Dec 2017 17:04)  HR: 83 (29 Dec 2017 05:00) (82 - 98)  BP: 132/78 (29 Dec 2017 05:00) (132/78 - 146/82)  BP(mean): --  RR: 18 (29 Dec 2017 05:00) (18 - 18)  SpO2: 98% (28 Dec 2017 14:30) (98% - 98%)    MEDICATIONS  (STANDING):  acetaminophen   Tablet. 975 milliGRAM(s) Oral every 6 hours  dextrose 5% + lactated ringers. 1000 milliLiter(s) (125 mL/Hr) IV Continuous <Continuous>  diphtheria/tetanus/pertussis (acellular) Vaccine (ADAcel) 0.5 milliLiter(s) IntraMuscular once  ibuprofen  Tablet 600 milliGRAM(s) Oral every 6 hours  labetalol Injectable 20 milliGRAM(s) IV Push once  misoprostol Oral Solution 60 MICROGram(s) Oral every 2 hours  oxyCODONE    IR 5 milliGRAM(s) Oral every 3 hours  oxytocin Infusion 41.667 milliUNIT(s)/Min (125 mL/Hr) IV Continuous <Continuous>  oxytocin Infusion 4 milliUNIT(s)/Min (4 mL/Hr) IV Continuous <Continuous>  prenatal multivitamin 1 Tablet(s) Oral daily  sodium chloride 0.9% lock flush 3 milliLiter(s) IV Push every 8 hours      Labs:  Blood type: O Positive  Rubella IgG: RPR: Negative                          10.8<L>   25.5<H> >-----------< 219    ( 12-28 @ 12:03 )             31.3<L>                        11.7   31.4<H> >-----------< 292    ( 12-28 @ 06:39 )             34.8                        13.0   15.7<H> >-----------< 235    ( 12-27 @ 03:59 )             36.8                        12.6   14.7<H> >-----------< 221    ( 12-26 @ 17:14 )             36.0    12-27-17 @ 03:59      137  |  103  |  13  ----------------------------<  86  3.8   |  21<L>  |  0.81    12-26-17 @ 17:14      135  |  102  |  14  ----------------------------<  139<H>  3.9   |  20<L>  |  0.87        Ca    9.5      27 Dec 2017 03:59  Ca    10.0      26 Dec 2017 17:14    TPro  6.1  /  Alb  3.0<L>  /  TBili  0.3  /  DBili  x   /  AST  18  /  ALT  15  /  AlkPhos  133<H>  12-27-17 @ 03:59  TPro  5.9<L>  /  Alb  2.7<L>  /  TBili  0.2  /  DBili  x   /  AST  19  /  ALT  15  /  AlkPhos  127<H>  12-26-17 @ 17:14          Physical Exam:  General: NAD  Abdomen: soft, non-tender, non-distended, fundus firm  Vaginal: Lochia wnl  Extremities: No erythema/edema

## 2018-01-02 LAB — SURGICAL PATHOLOGY STUDY: SIGNIFICANT CHANGE UP

## 2018-01-04 PROBLEM — Z34.02 ENCOUNTER FOR SUPERVISION OF NORMAL FIRST PREGNANCY IN SECOND TRIMESTER: Status: RESOLVED | Noted: 2017-06-13 | Resolved: 2018-01-04

## 2018-02-21 ENCOUNTER — APPOINTMENT (OUTPATIENT)
Dept: OBGYN | Facility: CLINIC | Age: 38
End: 2018-02-21
Payer: COMMERCIAL

## 2018-02-21 VITALS
BODY MASS INDEX: 34.4 KG/M2 | HEIGHT: 68 IN | WEIGHT: 227 LBS | DIASTOLIC BLOOD PRESSURE: 81 MMHG | SYSTOLIC BLOOD PRESSURE: 137 MMHG

## 2018-02-21 DIAGNOSIS — Z34.93 ENCOUNTER FOR SUPERVISION OF NORMAL PREGNANCY, UNSPECIFIED, THIRD TRIMESTER: ICD-10-CM

## 2018-02-21 DIAGNOSIS — Z87.440 PERSONAL HISTORY OF URINARY (TRACT) INFECTIONS: ICD-10-CM

## 2018-02-21 DIAGNOSIS — Z86.79 PERSONAL HISTORY OF OTHER DISEASES OF THE CIRCULATORY SYSTEM: ICD-10-CM

## 2018-02-21 PROCEDURE — 0503F POSTPARTUM CARE VISIT: CPT

## 2018-02-24 LAB — HPV HIGH+LOW RISK DNA PNL CVX: NOT DETECTED

## 2018-02-26 LAB — CYTOLOGY CVX/VAG DOC THIN PREP: NORMAL

## 2018-04-25 ENCOUNTER — RX RENEWAL (OUTPATIENT)
Age: 38
End: 2018-04-25

## 2019-09-28 NOTE — PATIENT PROFILE OB - PROVIDER NOTIFICATION
Patient:   BERNARD THOMPSON            MRN: [id number]            FIN: [id number]               Age:   3 years     Sex:  FEMALE     :  12   Associated Diagnoses:   None   Author:   LOYDA PEOPLES      History of Present Illness   Problem Statement   HPI:  Bernard is a 2yo female with pmh significant for mild persistent asthma and umbilical hernia presenting with fever, periumbilical abdominal pain and decreased appetite for 7 days. Mom states that symptoms began on  with fever, abdominal pain and decreased po intake. The pain was mostly localized to the R of her umbilicus near her umbilical hernia. She had no diarrhea, vomiting, rashes, or URI symptoms at that time. Parents were ill with viral gastroenteritis at that time and took her to the [facility] ER where she was evaluated and subsequently discharged home, after an abdominal CT and rapid strep test were negative,  with the diagnosis of viral gastro. The abdominal pain persisted and pt was brought back to [facility] on  and was hospitalized due to her severe pain and poor po intake until  when she was transferred to [facility].   During the course of her inpatient stay at [facility], she had initial labs including CBC and BMP (below) which were unremarkable. UA was performed which showed small LE. U/s of the ovaries was not suspicous for any ovarian pathology. Flu was negative, and KUB showed some constipation which was treated with Miralax and Senna without resolution of abdominal pain. The patient was treated with oral pain meds initially, and was encouraged to take po, but pain persisted to the extent that the patient required Morphine twice. The abdominal pain was routinely worse at night. Parents even note that at times the pain was very severe that the patient became \"tired out\". She also had two instances of vomiting (once on  and once on ) which parents attirbuted to eating a large meal. She otherwise was afebrile at [facility],  although she was receiving Tylenol and Motrin scheduled. Parents deny any history of abdominal pain or constipation. The patient has grown and developed appropriately. No family history of any IBD. Parents deny the patient ever having bloody stools in the past.     OSH Labs:  Ovarian U/S: wnl  KUB 1/17: no intussusception, large amounts of stool  KUB 1/22: wnl  UCx, BCx 1/16 no growth  Flu negative  BMP , K 4.1, , CO2 25, BUN 15, Creat 0.42, Glu 87  CBC: WBC 7.4, Hgb 12.6, Hct 35.9, Plt 292, 73% N, 15.6%L, 10%M, 1%E    ROS: A 10 pt ROS reviewed and otherwise negative other than as stated in the HPI    Diet: GPD    Last BM: 1/21    BHx: FT C Section. No NICU stay    PMH: Mild persistent asthma    PSH: T&A in 2014    Hospitalizations: none previous    ED Visits: none    Medications: Flovent 44mcg 2 puffs bid    Allergies: none    Family Hx: No family members with IBD. MGM with possible IBS. MGF with GERD, diverticulitis    Social Hx:     Home:  Education:  Activities:  Alcohol:  Drugs:  Tobacco:  Sexual Activity:  Travel History:    Immunizations: Up to date including flu vaccine this year    PMD: Dr. Arreola.        Review / Management   Vital Signs        Vitals between:   21-JAN-2017 18:25:43   TO   22-JAN-2017 18:25:43                   LAST RESULT MINIMUM MAXIMUM  Temperature 35.6 35.6 35.6  Heart Rate 92 92 92  NISBP           97 97 97  NIDBP           65 65 65  NIMBP           76 76 76     Medications:  Current Medications (ST)     Medications (1) Active  Scheduled: (0)  Continuous: (0)  PRN: (1)  Lidocaine 4% cream 5 gm  1 application, Topical, As Directed PRN  .    Lines and Tubes:  Lines, Tubes, Drains (ST)     NO DATA QUALIFIED FOR THIS PATIENT: LINES, TUBES AND DRAINS.  .       Physical Examination   General:  Alert and oriented, No acute distress.         Appearance: Well developed.    Eye:  Pupils are equal, round and reactive to light, Extraocular movements are intact, Normal conjunctiva.     HENT:  Normocephalic, Atraumatic, Oral mucosa is moist, No pharyngeal erythema.    Neck:  Supple, Non-tender, Shotty lymphadenopathy.    Neurologic:  Alert, Oriented, Cranial Nerves II-XII are grossly intact.    Respiratory:  Lungs are clear to auscultation, Respirations are non-labored, Breath sounds are equal, Symmetrical chest wall expansion.    Cardiovascular:  Normal rate, Regular rhythm, No murmur, Good pulses equal in all extremities, Normal peripheral perfusion.    Gastrointestinal:  Soft, Non-tender, Non-distended, Small 1cm reducible umbilical hernia lateral to the umbilicus on the R.    Genitourinary:  Normal genitalia for age and sex.    Musculoskeletal:  No tenderness, No swelling, No deformity.    Integumentary:  Warm, Dry, Pink, No rash.    Psychiatric:  Within normal limits.       Assessment and Plan   A/P:  Yonny is a 4 yo female with pmh significant for mild persistent asthma presenting with 1 week of intermittent fever, periumbilical abdominal pain, and decreased appetite. Symptoms initially concerning for viral gastroenteritis given positive history of sick contacts (mom and dad) with similar symptoms. Now clinically well appearing and having only mild abdominal pain that cannot be localized. Symptoms less concerning for etiology such as IBD given adequate growth, absence of bloody stools and no family history. Pt is currently hemodynamically stable and tolerating general diet.     Plan as follows:    DAYANA GRIFFITHS  CMP 1/23 AM  Consider bowel regimen if decreased stool output  Consider stool studies if symptoms worsen  Daily weights due to reported weight loss during admission at [facility]    ID  no leukocytosis on CBC 1/16; consider repeat if febrile or clinically worsens  currently afebrile  continue to monitor  BCx and UCx NG (final) at [facility]    Resp:  Stable on RA  no active issues    CVS:  HDS    Neuro:  Tylenol 15mg/kg q4h prn pain    Dispo: Pending adequate pain control and po  intake    Plan discussed with attending physician, nurse and family.    Alex Ramos D.O.  PGY1 Pediatrics.             Electronically Signed On 01/22/2017 21:36  __________________________________________________   WARREN-DO, ALEX              Pt seen and examined by me 1/22/17night (prior to midnight of 1/23/17) parents at bedside.  Resident H&P reviewed, OSH recs reviewed and imaging reviewed by me.  2yo previously healthy girl transferred from OSH for further evaluation of abdominal pain.  Suspect likely acute gastroenteritis given history, kirill history of sick household contacts on top of chrinic constipation.  Last emesis 1/21/17eve, no diarrhea.  Small BM 1/22/17.  Parents report increase in flatus.    Pt mostly independent with toileting, has had several episodes of constipation, treated with prune juice only.  Stooled in firts 24hrs of life.  She has never had bloody stools.  No mucus in stools.  Normal appetite until this episode.  Normal growth patterns.  No FHX of IBD.  No previous UTI hx.    However, mom does state she had extensive workup as child (colonoscopies) and eventually found to have ovarian cysts.  Mom also had a ruptured appy several ears ago, so is nervous her pain might be due to appendicitis, although OSH exam documentation, imaging and exams here do not support this.   Of note, OSH CT report does not comment that it officially visualizes appendix, but no signs of appendicitis.  Of note, mild intermittent asthma.  On Flovent x1year.  Last orapred in Oct 2016.  No previous hospitalizations.    Exam as below. including: non-toxic, smiling and playful.  Eating and drinking while at bedside, MMM, abd soft, ND, c/o tenderness generalized (not consistent on exams)   NO HSM No masses, +BS No R/R/G  Neg psoas and obturator signs, no flank tenderness, no rashes/bruising, no joint redness/swelling, rest of exam as below.  Discussed expected clinical course of viral gastritis.  Also discussed  constipation at length, including home mgmt to include scheduled toilet time q12, positive reinforcement techniques and continued use of miralax.  No NG cleanout indicated at this time.  Also do not feel GI consult indicated at this time given this info.  Parents comfortable with this plan, all questions answered.  >70min spent, including time at bedside, reviewing OSH recs and coordinating cares.             Electronically Signed On 01/23/2017 01:44  __________________________________________________   ROSSY-BROCK RIVAS     Declines

## 2019-12-21 ENCOUNTER — TRANSCRIPTION ENCOUNTER (OUTPATIENT)
Age: 39
End: 2019-12-21

## 2020-02-19 ENCOUNTER — TRANSCRIPTION ENCOUNTER (OUTPATIENT)
Age: 40
End: 2020-02-19

## 2020-03-23 ENCOUNTER — APPOINTMENT (OUTPATIENT)
Dept: OBGYN | Facility: CLINIC | Age: 40
End: 2020-03-23
Payer: COMMERCIAL

## 2020-03-23 VITALS
BODY MASS INDEX: 31.67 KG/M2 | WEIGHT: 209 LBS | SYSTOLIC BLOOD PRESSURE: 126 MMHG | HEIGHT: 68 IN | DIASTOLIC BLOOD PRESSURE: 74 MMHG

## 2020-03-23 PROCEDURE — 99213 OFFICE O/P EST LOW 20 MIN: CPT | Mod: 25

## 2020-03-23 PROCEDURE — 76857 US EXAM PELVIC LIMITED: CPT

## 2020-03-23 RX ORDER — NORETHINDRONE ACETATE AND ETHINYL ESTRADIOL 1; 20 MG/1; UG/1
1-20 TABLET ORAL DAILY
Qty: 63 | Refills: 2 | Status: DISCONTINUED | COMMUNITY
Start: 2018-02-21 | End: 2020-03-23

## 2020-03-23 RX ORDER — NITROFURANTOIN (MONOHYDRATE/MACROCRYSTALS) 25; 75 MG/1; MG/1
100 CAPSULE ORAL TWICE DAILY
Qty: 14 | Refills: 0 | Status: DISCONTINUED | COMMUNITY
Start: 2017-06-13 | End: 2020-03-23

## 2020-03-23 NOTE — PROCEDURE
[Intrauterine Pregnancy] : intrauterine pregnancy [Yolk Sac] : yolk sac present [Fetal Heart] : fetal heart present [Current GA by Sonogram: ___ (wks)] : Current GA by Sonogram: [unfilled]Uwks [___ day(s)] : [unfilled] days

## 2020-04-13 ENCOUNTER — APPOINTMENT (OUTPATIENT)
Dept: OBGYN | Facility: CLINIC | Age: 40
End: 2020-04-13
Payer: COMMERCIAL

## 2020-04-13 ENCOUNTER — APPOINTMENT (OUTPATIENT)
Dept: ANTEPARTUM | Facility: CLINIC | Age: 40
End: 2020-04-13
Payer: COMMERCIAL

## 2020-04-13 ENCOUNTER — ASOB RESULT (OUTPATIENT)
Age: 40
End: 2020-04-13

## 2020-04-13 ENCOUNTER — APPOINTMENT (OUTPATIENT)
Dept: ANTEPARTUM | Facility: CLINIC | Age: 40
End: 2020-04-13

## 2020-04-13 VITALS
HEIGHT: 68 IN | DIASTOLIC BLOOD PRESSURE: 74 MMHG | SYSTOLIC BLOOD PRESSURE: 130 MMHG | WEIGHT: 207 LBS | BODY MASS INDEX: 31.37 KG/M2

## 2020-04-13 PROCEDURE — 36416 COLLJ CAPILLARY BLOOD SPEC: CPT

## 2020-04-13 PROCEDURE — 76813 OB US NUCHAL MEAS 1 GEST: CPT

## 2020-04-13 PROCEDURE — 0501F PRENATAL FLOW SHEET: CPT

## 2020-04-13 PROCEDURE — 99213 OFFICE O/P EST LOW 20 MIN: CPT

## 2020-04-13 PROCEDURE — 76801 OB US < 14 WKS SINGLE FETUS: CPT

## 2020-04-14 LAB
ABO + RH PNL BLD: NORMAL
BLD GP AB SCN SERPL QL: NORMAL
C TRACH RRNA SPEC QL NAA+PROBE: NOT DETECTED
N GONORRHOEA RRNA SPEC QL NAA+PROBE: NOT DETECTED
SOURCE AMPLIFICATION: NORMAL

## 2020-04-15 ENCOUNTER — OUTPATIENT (OUTPATIENT)
Dept: OUTPATIENT SERVICES | Facility: HOSPITAL | Age: 40
LOS: 1 days | End: 2020-04-15
Payer: COMMERCIAL

## 2020-04-15 ENCOUNTER — APPOINTMENT (OUTPATIENT)
Dept: ANTEPARTUM | Facility: CLINIC | Age: 40
End: 2020-04-15
Payer: COMMERCIAL

## 2020-04-15 ENCOUNTER — ASOB RESULT (OUTPATIENT)
Age: 40
End: 2020-04-15

## 2020-04-15 DIAGNOSIS — Z3A.12 12 WEEKS GESTATION OF PREGNANCY: ICD-10-CM

## 2020-04-15 DIAGNOSIS — Z01.818 ENCOUNTER FOR OTHER PREPROCEDURAL EXAMINATION: ICD-10-CM

## 2020-04-15 PROCEDURE — 36415 COLL VENOUS BLD VENIPUNCTURE: CPT

## 2020-04-15 PROCEDURE — 88275 CYTOGENETICS 100-300: CPT

## 2020-04-15 PROCEDURE — 88285 CHROMOSOME COUNT ADDITIONAL: CPT

## 2020-04-15 PROCEDURE — 59015 CHORION BIOPSY: CPT

## 2020-04-15 PROCEDURE — 88235 TISSUE CULTURE PLACENTA: CPT

## 2020-04-15 PROCEDURE — 88271 CYTOGENETICS DNA PROBE: CPT

## 2020-04-15 PROCEDURE — 88267 CHROMOSOME ANALYS PLACENTA: CPT

## 2020-04-15 PROCEDURE — 88280 CHROMOSOME KARYOTYPE STUDY: CPT

## 2020-04-15 PROCEDURE — 76945 ECHO GUIDE VILLUS SAMPLING: CPT

## 2020-04-15 PROCEDURE — 76945 ECHO GUIDE VILLUS SAMPLING: CPT | Mod: 26

## 2020-04-15 PROCEDURE — 88291 CYTO/MOLECULAR REPORT: CPT

## 2020-04-15 PROCEDURE — 81229 CYTOG ALYS CHRML ABNR SNPCGH: CPT

## 2020-04-16 LAB — SUBTELOMERE ANALYSIS BLD/T FISH-IMP: SIGNIFICANT CHANGE UP

## 2020-04-17 ENCOUNTER — TRANSCRIPTION ENCOUNTER (OUTPATIENT)
Age: 40
End: 2020-04-17

## 2020-04-20 ENCOUNTER — APPOINTMENT (OUTPATIENT)
Dept: OBGYN | Facility: CLINIC | Age: 40
End: 2020-04-20
Payer: COMMERCIAL

## 2020-04-20 ENCOUNTER — TRANSCRIPTION ENCOUNTER (OUTPATIENT)
Age: 40
End: 2020-04-20

## 2020-04-20 ENCOUNTER — LABORATORY RESULT (OUTPATIENT)
Age: 40
End: 2020-04-20

## 2020-04-20 VITALS
BODY MASS INDEX: 31.67 KG/M2 | DIASTOLIC BLOOD PRESSURE: 78 MMHG | WEIGHT: 209 LBS | SYSTOLIC BLOOD PRESSURE: 120 MMHG | HEIGHT: 68 IN

## 2020-04-20 PROCEDURE — 99215 OFFICE O/P EST HI 40 MIN: CPT | Mod: 95

## 2020-04-20 PROCEDURE — 99214 OFFICE O/P EST MOD 30 MIN: CPT | Mod: 95,25

## 2020-04-20 RX ORDER — SODIUM CHLORIDE 9 MG/ML
3 INJECTION INTRAMUSCULAR; INTRAVENOUS; SUBCUTANEOUS EVERY 8 HOURS
Refills: 0 | Status: DISCONTINUED | OUTPATIENT
Start: 2020-04-21 | End: 2020-05-06

## 2020-04-20 NOTE — HISTORY OF PRESENT ILLNESS
[Other Location: e.g. School (Enter Location, City,State)___] : at [unfilled], at the time of the visit. [Medical Office: ([facility])___] : at the medical office located in  [Patient] : the patient [Definite:  ___ (Date)] : the last menstrual period was [unfilled] [Regular Cycle Intervals] : periods have been regular

## 2020-04-21 ENCOUNTER — RESULT REVIEW (OUTPATIENT)
Age: 40
End: 2020-04-21

## 2020-04-21 ENCOUNTER — OUTPATIENT (OUTPATIENT)
Dept: OUTPATIENT SERVICES | Facility: HOSPITAL | Age: 40
LOS: 1 days | End: 2020-04-21
Payer: COMMERCIAL

## 2020-04-21 ENCOUNTER — APPOINTMENT (OUTPATIENT)
Dept: OBGYN | Facility: CLINIC | Age: 40
End: 2020-04-21

## 2020-04-21 VITALS
RESPIRATION RATE: 16 BRPM | OXYGEN SATURATION: 100 % | SYSTOLIC BLOOD PRESSURE: 116 MMHG | TEMPERATURE: 98 F | HEART RATE: 68 BPM | DIASTOLIC BLOOD PRESSURE: 76 MMHG | HEIGHT: 68 IN | WEIGHT: 209 LBS

## 2020-04-21 VITALS
OXYGEN SATURATION: 98 % | RESPIRATION RATE: 16 BRPM | SYSTOLIC BLOOD PRESSURE: 117 MMHG | DIASTOLIC BLOOD PRESSURE: 66 MMHG | HEART RATE: 62 BPM

## 2020-04-21 DIAGNOSIS — Q90.9 DOWN SYNDROME, UNSPECIFIED: ICD-10-CM

## 2020-04-21 DIAGNOSIS — O35.8XX0 MATERNAL CARE FOR OTHER (SUSPECTED) FETAL ABNORMALITY AND DAMAGE, NOT APPLICABLE OR UNSPECIFIED: ICD-10-CM

## 2020-04-21 DIAGNOSIS — O35.9XX0 MATERNAL CARE FOR (SUSPECTED) FETAL ABNORMALITY AND DAMAGE, UNSPECIFIED, NOT APPLICABLE OR UNSPECIFIED: ICD-10-CM

## 2020-04-21 DIAGNOSIS — Z98.890 OTHER SPECIFIED POSTPROCEDURAL STATES: Chronic | ICD-10-CM

## 2020-04-21 DIAGNOSIS — O99.211 OBESITY COMPLICATING PREGNANCY, FIRST TRIMESTER: ICD-10-CM

## 2020-04-21 DIAGNOSIS — O09.521 SUPERVISION OF ELDERLY MULTIGRAVIDA, FIRST TRIMESTER: ICD-10-CM

## 2020-04-21 LAB
BASOPHILS # BLD AUTO: 0.02 K/UL — SIGNIFICANT CHANGE UP (ref 0–0.2)
BASOPHILS NFR BLD AUTO: 0.2 % — SIGNIFICANT CHANGE UP (ref 0–2)
BLD GP AB SCN SERPL QL: NEGATIVE — SIGNIFICANT CHANGE UP
EOSINOPHIL # BLD AUTO: 0.08 K/UL — SIGNIFICANT CHANGE UP (ref 0–0.5)
EOSINOPHIL NFR BLD AUTO: 0.7 % — SIGNIFICANT CHANGE UP (ref 0–6)
HCT VFR BLD CALC: 42 % — SIGNIFICANT CHANGE UP (ref 34.5–45)
HGB BLD-MCNC: 14.1 G/DL — SIGNIFICANT CHANGE UP (ref 11.5–15.5)
IMM GRANULOCYTES NFR BLD AUTO: 0.3 % — SIGNIFICANT CHANGE UP (ref 0–1.5)
LYMPHOCYTES # BLD AUTO: 29.7 % — SIGNIFICANT CHANGE UP (ref 13–44)
LYMPHOCYTES # BLD AUTO: 3.26 K/UL — SIGNIFICANT CHANGE UP (ref 1–3.3)
MCHC RBC-ENTMCNC: 30.1 PG — SIGNIFICANT CHANGE UP (ref 27–34)
MCHC RBC-ENTMCNC: 33.6 GM/DL — SIGNIFICANT CHANGE UP (ref 32–36)
MCV RBC AUTO: 89.6 FL — SIGNIFICANT CHANGE UP (ref 80–100)
MONOCYTES # BLD AUTO: 0.68 K/UL — SIGNIFICANT CHANGE UP (ref 0–0.9)
MONOCYTES NFR BLD AUTO: 6.2 % — SIGNIFICANT CHANGE UP (ref 2–14)
NEUTROPHILS # BLD AUTO: 6.9 K/UL — SIGNIFICANT CHANGE UP (ref 1.8–7.4)
NEUTROPHILS NFR BLD AUTO: 62.9 % — SIGNIFICANT CHANGE UP (ref 43–77)
NRBC # BLD: 0 /100 WBCS — SIGNIFICANT CHANGE UP (ref 0–0)
PLATELET # BLD AUTO: 275 K/UL — SIGNIFICANT CHANGE UP (ref 150–400)
RBC # BLD: 4.69 M/UL — SIGNIFICANT CHANGE UP (ref 3.8–5.2)
RBC # FLD: 12.2 % — SIGNIFICANT CHANGE UP (ref 10.3–14.5)
RH IG SCN BLD-IMP: POSITIVE — SIGNIFICANT CHANGE UP
WBC # BLD: 10.97 K/UL — HIGH (ref 3.8–10.5)
WBC # FLD AUTO: 10.97 K/UL — HIGH (ref 3.8–10.5)

## 2020-04-21 PROCEDURE — 86900 BLOOD TYPING SEROLOGIC ABO: CPT

## 2020-04-21 PROCEDURE — 86901 BLOOD TYPING SEROLOGIC RH(D): CPT

## 2020-04-21 PROCEDURE — 88305 TISSUE EXAM BY PATHOLOGIST: CPT

## 2020-04-21 PROCEDURE — 86850 RBC ANTIBODY SCREEN: CPT

## 2020-04-21 PROCEDURE — 88305 TISSUE EXAM BY PATHOLOGIST: CPT | Mod: 26

## 2020-04-21 PROCEDURE — 76998 US GUIDE INTRAOP: CPT | Mod: 26

## 2020-04-21 PROCEDURE — 85027 COMPLETE CBC AUTOMATED: CPT

## 2020-04-21 PROCEDURE — 59840 INDUCED ABORTION D&C: CPT

## 2020-04-21 RX ORDER — OXYCODONE AND ACETAMINOPHEN 5; 325 MG/1; MG/1
1 TABLET ORAL ONCE
Refills: 0 | Status: DISCONTINUED | OUTPATIENT
Start: 2020-04-21 | End: 2020-04-22

## 2020-04-21 RX ORDER — ONDANSETRON 8 MG/1
4 TABLET, FILM COATED ORAL ONCE
Refills: 0 | Status: DISCONTINUED | OUTPATIENT
Start: 2020-04-21 | End: 2020-04-22

## 2020-04-21 RX ORDER — OXYCODONE AND ACETAMINOPHEN 5; 325 MG/1; MG/1
2 TABLET ORAL ONCE
Refills: 0 | Status: DISCONTINUED | OUTPATIENT
Start: 2020-04-21 | End: 2020-04-22

## 2020-04-21 RX ORDER — METOCLOPRAMIDE HCL 10 MG
10 TABLET ORAL ONCE
Refills: 0 | Status: DISCONTINUED | OUTPATIENT
Start: 2020-04-21 | End: 2020-04-22

## 2020-04-21 RX ADMIN — SODIUM CHLORIDE 3 MILLILITER(S): 9 INJECTION INTRAMUSCULAR; INTRAVENOUS; SUBCUTANEOUS at 10:56

## 2020-04-21 NOTE — ASU DISCHARGE PLAN (ADULT/PEDIATRIC) - CALL YOUR DOCTOR IF YOU HAVE ANY OF THE FOLLOWING:
Bleeding that does not stop/Fever greater than (need to indicate Fahrenheit or Celsius)/Wound/Surgical Site with redness, or foul smelling discharge or pus/Unable to urinate

## 2020-04-21 NOTE — H&P PST ADULT - HISTORY OF PRESENT ILLNESS
40 yo female, LMP 20, , U/S abnormal, repeated 20, screening blood work, revealing fetal anomaly. Pt. presents to [facility] today for scheduled D&C for Fetal anomaly. Pt. denies recent fever, chills, chest pain, SOB, changes in bowel/urinary habits, has not traveled recently and hasn't had contact with anyone who has been sick or traveled outside [place].  Pt. was asked by Dr. Melton's office to be tested for COVID19 pre-op and had it done yesterday- no results as of yet.

## 2020-04-21 NOTE — H&P PST ADULT - NSANTHOSAYNRD_GEN_A_CORE
No. YURIY screening performed.  STOP BANG Legend: 0-2 = LOW Risk; 3-4 = INTERMEDIATE Risk; 5-8 = HIGH Risk

## 2020-04-21 NOTE — BRIEF OPERATIVE NOTE - OPERATION/FINDINGS
13w anteverted uterus  uterus cleared of POC under ultrasound guidance  good hemostasis 12 week anteverted uterus.  poc c/w EGA, all fetal parts accounted for. BT: O+

## 2020-04-21 NOTE — ASU DISCHARGE PLAN (ADULT/PEDIATRIC) - ASU DC SPECIAL INSTRUCTIONSFT
Contact your doctor if you are soaking a pad every 30 minutes to an hour or experience clots. Call your doctor for any SIGNS OR SYMPTOMS OF INFECTION: Fever, chills, temperature  100.4 or higher or have a foul smelling discharge.     take over the counter medication for pain control: tylenol 957mg every 6 hours and motrin 600 mg every 6 hours    f/u with Dr Melton in 2 weeks

## 2020-04-21 NOTE — ASU DISCHARGE PLAN (ADULT/PEDIATRIC) - CARE PROVIDER_API CALL
Ananya Melton)  OBSN  General  [address] [place] [id number]  Phone: [phone]  Fax: [phone]  Follow Up Time:

## 2020-04-21 NOTE — BRIEF OPERATIVE NOTE - NSICDXBRIEFPROCEDURE_GEN_ALL_CORE_FT
PROCEDURES:  Dilation and curettage, uterus 21-Apr-2020 13:02:06  Kenna Peres PROCEDURES:  Induction of  by dilation and curettage 2020 14:53:50 1.  examination under anesthesia  2. dilation and curettage under ultrasound guidance Ananya Melton

## 2020-04-21 NOTE — H&P PST ADULT - NSICDXPROBLEM_GEN_ALL_CORE_FT
PROBLEM DIAGNOSES  Problem: Known fetal anomaly, antepartum  Assessment and Plan:  D&C for Fetal anomaly scheduled today in [facility]  NPO since 6 pm last night

## 2020-04-22 PROBLEM — Z78.9 OTHER SPECIFIED HEALTH STATUS: Chronic | Status: ACTIVE | Noted: 2020-04-21

## 2020-04-23 LAB
CHROM ANALY OVERALL INTERP SPEC-IMP: SIGNIFICANT CHANGE UP
NYS PRENATAL DIAGNOSIS FOLLOW-UP QUESTIONNAIRE: SIGNIFICANT CHANGE UP
SURGICAL PATHOLOGY STUDY: SIGNIFICANT CHANGE UP

## 2020-05-06 ENCOUNTER — APPOINTMENT (OUTPATIENT)
Dept: OBGYN | Facility: CLINIC | Age: 40
End: 2020-05-06
Payer: COMMERCIAL

## 2020-05-06 PROCEDURE — 99211 OFF/OP EST MAY X REQ PHY/QHP: CPT | Mod: GT

## 2020-05-06 NOTE — HISTORY OF PRESENT ILLNESS
[Home] : at home, [unfilled] , at the time of the visit. [Medical Office: ([facility])___] : at the medical office located in  [Patient] : the patient [Self] : self

## 2020-05-08 ENCOUNTER — APPOINTMENT (OUTPATIENT)
Dept: HUMAN REPRODUCTION | Facility: CLINIC | Age: 40
End: 2020-05-08
Payer: COMMERCIAL

## 2020-05-08 PROCEDURE — 99205 OFFICE O/P NEW HI 60 MIN: CPT | Mod: 95

## 2020-05-21 ENCOUNTER — APPOINTMENT (OUTPATIENT)
Dept: MAMMOGRAPHY | Facility: CLINIC | Age: 40
End: 2020-05-21
Payer: COMMERCIAL

## 2020-05-21 ENCOUNTER — APPOINTMENT (OUTPATIENT)
Dept: ULTRASOUND IMAGING | Facility: CLINIC | Age: 40
End: 2020-05-21

## 2020-05-21 ENCOUNTER — OUTPATIENT (OUTPATIENT)
Dept: OUTPATIENT SERVICES | Facility: HOSPITAL | Age: 40
LOS: 1 days | End: 2020-05-21
Payer: COMMERCIAL

## 2020-05-21 ENCOUNTER — RESULT REVIEW (OUTPATIENT)
Age: 40
End: 2020-05-21

## 2020-05-21 DIAGNOSIS — Z00.00 ENCOUNTER FOR GENERAL ADULT MEDICAL EXAMINATION WITHOUT ABNORMAL FINDINGS: ICD-10-CM

## 2020-05-21 DIAGNOSIS — Z98.890 OTHER SPECIFIED POSTPROCEDURAL STATES: Chronic | ICD-10-CM

## 2020-05-21 PROCEDURE — 77067 SCR MAMMO BI INCL CAD: CPT | Mod: 26

## 2020-05-21 PROCEDURE — 76641 ULTRASOUND BREAST COMPLETE: CPT | Mod: 26,50

## 2020-05-21 PROCEDURE — 77063 BREAST TOMOSYNTHESIS BI: CPT

## 2020-05-21 PROCEDURE — 77067 SCR MAMMO BI INCL CAD: CPT

## 2020-05-21 PROCEDURE — 76641 ULTRASOUND BREAST COMPLETE: CPT

## 2020-05-21 PROCEDURE — 77063 BREAST TOMOSYNTHESIS BI: CPT | Mod: 26

## 2020-07-09 ENCOUNTER — TRANSCRIPTION ENCOUNTER (OUTPATIENT)
Age: 40
End: 2020-07-09

## 2020-07-28 ENCOUNTER — APPOINTMENT (OUTPATIENT)
Dept: OBGYN | Facility: CLINIC | Age: 40
End: 2020-07-28
Payer: COMMERCIAL

## 2020-07-28 VITALS
WEIGHT: 225 LBS | DIASTOLIC BLOOD PRESSURE: 80 MMHG | BODY MASS INDEX: 34.1 KG/M2 | HEIGHT: 68 IN | SYSTOLIC BLOOD PRESSURE: 110 MMHG

## 2020-07-28 DIAGNOSIS — Z86.018 PERSONAL HISTORY OF OTHER BENIGN NEOPLASM: ICD-10-CM

## 2020-07-28 DIAGNOSIS — Q90.9 DOWN SYNDROME, UNSPECIFIED: ICD-10-CM

## 2020-07-28 DIAGNOSIS — O35.9XX0 MATERNAL CARE FOR (SUSPECTED) FETAL ABNORMALITY AND DAMAGE, UNSPECIFIED, NOT APPLICABLE OR UNSPECIFIED: ICD-10-CM

## 2020-07-28 PROCEDURE — 99213 OFFICE O/P EST LOW 20 MIN: CPT | Mod: 25

## 2020-07-28 PROCEDURE — 76857 US EXAM PELVIC LIMITED: CPT

## 2020-07-28 NOTE — CHIEF COMPLAINT
[FreeTextEntry1] : LMP 5/25\par Positive pregnancy test \par Wants Angela test ASAP as last pregnancy was complicated by Down's syndrome  [Follow Up] : follow up GYN visit

## 2020-07-28 NOTE — PROCEDURE
[Intrauterine Pregnancy] : intrauterine pregnancy [Yolk Sac] : yolk sac present [Fetal Heart] : fetal heart present [Date: ___] : EDC: [unfilled] [Current GA by Sonogram: ___ (wks)] : Current GA by Sonogram: [unfilled]Uwks [___ day(s)] : [unfilled] days [WNL] : Transvaginal OB Sonogram WNL

## 2020-07-30 LAB
C TRACH RRNA SPEC QL NAA+PROBE: NOT DETECTED
N GONORRHOEA RRNA SPEC QL NAA+PROBE: NOT DETECTED
SOURCE AMPLIFICATION: NORMAL

## 2020-08-06 LAB
ABO + RH PNL BLD: NORMAL
BASOPHILS # BLD AUTO: 0.02 K/UL
BASOPHILS NFR BLD AUTO: 0.2 %
BLD GP AB SCN SERPL QL: NORMAL
EOSINOPHIL # BLD AUTO: 0.08 K/UL
EOSINOPHIL NFR BLD AUTO: 0.9 %
HBV SURFACE AG SER QL: NONREACTIVE
HCT VFR BLD CALC: 44.8 %
HGB BLD-MCNC: 14 G/DL
HIV1+2 AB SPEC QL IA.RAPID: NONREACTIVE
IMM GRANULOCYTES NFR BLD AUTO: 0.5 %
LEAD BLD-MCNC: <1 UG/DL
LYMPHOCYTES # BLD AUTO: 2.54 K/UL
LYMPHOCYTES NFR BLD AUTO: 27.3 %
MAN DIFF?: NORMAL
MCHC RBC-ENTMCNC: 29 PG
MCHC RBC-ENTMCNC: 31.3 GM/DL
MCV RBC AUTO: 92.8 FL
MEV IGG FLD QL IA: >300 AU/ML
MEV IGG+IGM SER-IMP: POSITIVE
MONOCYTES # BLD AUTO: 0.56 K/UL
MONOCYTES NFR BLD AUTO: 6 %
NEUTROPHILS # BLD AUTO: 6.05 K/UL
NEUTROPHILS NFR BLD AUTO: 65.1 %
PLATELET # BLD AUTO: 260 K/UL
RBC # BLD: 4.83 M/UL
RBC # FLD: 12 %
RUBV IGG FLD-ACNC: 1.2 INDEX
RUBV IGG SER-IMP: POSITIVE
T PALLIDUM AB SER QL IA: NEGATIVE
TSH SERPL-ACNC: 0.6 UIU/ML
WBC # FLD AUTO: 9.3 K/UL

## 2020-08-10 LAB — BACTERIA UR CULT: NORMAL

## 2020-08-11 ENCOUNTER — NON-APPOINTMENT (OUTPATIENT)
Age: 40
End: 2020-08-11

## 2020-08-11 ENCOUNTER — APPOINTMENT (OUTPATIENT)
Dept: OBGYN | Facility: CLINIC | Age: 40
End: 2020-08-11
Payer: COMMERCIAL

## 2020-08-11 VITALS
WEIGHT: 225 LBS | BODY MASS INDEX: 34.1 KG/M2 | SYSTOLIC BLOOD PRESSURE: 114 MMHG | HEIGHT: 68 IN | DIASTOLIC BLOOD PRESSURE: 72 MMHG

## 2020-08-11 DIAGNOSIS — R92.2 INCONCLUSIVE MAMMOGRAM: ICD-10-CM

## 2020-08-11 PROCEDURE — 76817 TRANSVAGINAL US OBSTETRIC: CPT

## 2020-08-11 PROCEDURE — 0501F PRENATAL FLOW SHEET: CPT

## 2020-08-12 LAB
CLARI ADDITIONAL INFO: NORMAL
CLARI CHROMOSOME 13: NORMAL
CLARI CHROMOSOME 18: NORMAL
CLARI CHROMOSOME 21: NORMAL
CLARI SEX CHROMOSOMES: NORMAL
CLARITEST NIPT: NORMAL

## 2020-08-19 ENCOUNTER — APPOINTMENT (OUTPATIENT)
Dept: ANTEPARTUM | Facility: CLINIC | Age: 40
End: 2020-08-19
Payer: COMMERCIAL

## 2020-08-19 ENCOUNTER — ASOB RESULT (OUTPATIENT)
Age: 40
End: 2020-08-19

## 2020-08-19 PROCEDURE — 76801 OB US < 14 WKS SINGLE FETUS: CPT

## 2020-08-19 PROCEDURE — 76813 OB US NUCHAL MEAS 1 GEST: CPT

## 2020-08-19 PROCEDURE — 36416 COLLJ CAPILLARY BLOOD SPEC: CPT

## 2020-09-17 ENCOUNTER — APPOINTMENT (OUTPATIENT)
Dept: OBGYN | Facility: CLINIC | Age: 40
End: 2020-09-17
Payer: COMMERCIAL

## 2020-09-17 VITALS
SYSTOLIC BLOOD PRESSURE: 126 MMHG | BODY MASS INDEX: 35.46 KG/M2 | WEIGHT: 234 LBS | HEIGHT: 68 IN | DIASTOLIC BLOOD PRESSURE: 80 MMHG

## 2020-09-17 PROCEDURE — 0502F SUBSEQUENT PRENATAL CARE: CPT

## 2020-09-24 LAB
1ST TRIMESTER DATA: NORMAL
2ND TRIMESTER DATA: NORMAL
ADDENDUM DOC: NORMAL
AFP PNL SERPL: NORMAL
AFP SERPL-ACNC: NORMAL
AFP SERPL-ACNC: NORMAL
B-HCG FREE SERPL-MCNC: NORMAL
CLINICAL BIOCHEMIST REVIEW: ABNORMAL
CLINICAL BIOCHEMIST REVIEW: NORMAL
CLINICAL BIOCHEMIST REVIEW: NORMAL
FREE BETA HCG 1ST TRIMESTER: NORMAL
INHIBIN A SERPL-MCNC: NORMAL
Lab: NORMAL
NOTES NTD: NORMAL
NT: NORMAL
PAPP-A SERPL-ACNC: NORMAL
U ESTRIOL SERPL-SCNC: NORMAL

## 2020-10-12 ENCOUNTER — ASOB RESULT (OUTPATIENT)
Age: 40
End: 2020-10-12

## 2020-10-12 ENCOUNTER — APPOINTMENT (OUTPATIENT)
Dept: ANTEPARTUM | Facility: CLINIC | Age: 40
End: 2020-10-12
Payer: COMMERCIAL

## 2020-10-12 PROCEDURE — 76817 TRANSVAGINAL US OBSTETRIC: CPT | Mod: 59

## 2020-10-12 PROCEDURE — 76811 OB US DETAILED SNGL FETUS: CPT

## 2020-10-19 ENCOUNTER — NON-APPOINTMENT (OUTPATIENT)
Age: 40
End: 2020-10-19

## 2020-10-19 ENCOUNTER — APPOINTMENT (OUTPATIENT)
Dept: OBGYN | Facility: CLINIC | Age: 40
End: 2020-10-19
Payer: COMMERCIAL

## 2020-10-19 VITALS
HEIGHT: 68 IN | SYSTOLIC BLOOD PRESSURE: 120 MMHG | WEIGHT: 240 LBS | DIASTOLIC BLOOD PRESSURE: 60 MMHG | BODY MASS INDEX: 36.37 KG/M2

## 2020-10-19 DIAGNOSIS — Z87.42 PERSONAL HISTORY OF OTHER DISEASES OF THE FEMALE GENITAL TRACT: ICD-10-CM

## 2020-10-19 DIAGNOSIS — Z34.91 ENCOUNTER FOR SUPERVISION OF NORMAL PREGNANCY, UNSPECIFIED, FIRST TRIMESTER: ICD-10-CM

## 2020-10-19 PROCEDURE — 81003 URINALYSIS AUTO W/O SCOPE: CPT | Mod: QW

## 2020-10-19 PROCEDURE — 90471 IMMUNIZATION ADMIN: CPT

## 2020-10-19 PROCEDURE — 90686 IIV4 VACC NO PRSV 0.5 ML IM: CPT

## 2020-10-19 PROCEDURE — 0502F SUBSEQUENT PRENATAL CARE: CPT

## 2020-11-16 ENCOUNTER — APPOINTMENT (OUTPATIENT)
Dept: OBGYN | Facility: CLINIC | Age: 40
End: 2020-11-16
Payer: COMMERCIAL

## 2020-11-16 VITALS
SYSTOLIC BLOOD PRESSURE: 140 MMHG | WEIGHT: 249 LBS | BODY MASS INDEX: 37.74 KG/M2 | DIASTOLIC BLOOD PRESSURE: 72 MMHG | HEIGHT: 68 IN

## 2020-11-16 VITALS — TEMPERATURE: 97.1 F

## 2020-11-16 PROCEDURE — 0502F SUBSEQUENT PRENATAL CARE: CPT

## 2020-11-17 LAB
BASOPHILS # BLD AUTO: 0.02 K/UL
BASOPHILS NFR BLD AUTO: 0.1 %
EOSINOPHIL # BLD AUTO: 0.07 K/UL
EOSINOPHIL NFR BLD AUTO: 0.5 %
GLUCOSE 1H P 100 G GLC PO SERPL-MCNC: 144 MG/DL
HCT VFR BLD CALC: 42.7 %
HGB BLD-MCNC: 13.7 G/DL
HIV1+2 AB SPEC QL IA.RAPID: NONREACTIVE
IMM GRANULOCYTES NFR BLD AUTO: 1.2 %
LYMPHOCYTES # BLD AUTO: 2.92 K/UL
LYMPHOCYTES NFR BLD AUTO: 21.3 %
MAN DIFF?: NORMAL
MCHC RBC-ENTMCNC: 30.2 PG
MCHC RBC-ENTMCNC: 32.1 GM/DL
MCV RBC AUTO: 94.1 FL
MONOCYTES # BLD AUTO: 0.79 K/UL
MONOCYTES NFR BLD AUTO: 5.7 %
NEUTROPHILS # BLD AUTO: 9.77 K/UL
NEUTROPHILS NFR BLD AUTO: 71.2 %
PLATELET # BLD AUTO: 310 K/UL
RBC # BLD: 4.54 M/UL
RBC # FLD: 12.7 %
WBC # FLD AUTO: 13.74 K/UL

## 2020-11-24 ENCOUNTER — TRANSCRIPTION ENCOUNTER (OUTPATIENT)
Age: 40
End: 2020-11-24

## 2020-11-24 LAB
GLUCOSE 1H P 100 G GLC PO SERPL-MCNC: 153 MG/DL
GLUCOSE 2H P CHAL SERPL-MCNC: 144 MG/DL
GLUCOSE 3H P CHAL SERPL-MCNC: 118 MG/DL
GLUCOSE BS SERPL-MCNC: 93 MG/DL

## 2020-12-07 ENCOUNTER — APPOINTMENT (OUTPATIENT)
Dept: ANTEPARTUM | Facility: CLINIC | Age: 40
End: 2020-12-07

## 2020-12-14 ENCOUNTER — APPOINTMENT (OUTPATIENT)
Dept: OBGYN | Facility: CLINIC | Age: 40
End: 2020-12-14
Payer: COMMERCIAL

## 2020-12-14 VITALS
HEIGHT: 68 IN | BODY MASS INDEX: 38.04 KG/M2 | WEIGHT: 251 LBS | DIASTOLIC BLOOD PRESSURE: 78 MMHG | SYSTOLIC BLOOD PRESSURE: 122 MMHG

## 2020-12-14 VITALS — TEMPERATURE: 97.1 F

## 2020-12-14 PROCEDURE — 0502F SUBSEQUENT PRENATAL CARE: CPT

## 2021-01-11 ENCOUNTER — APPOINTMENT (OUTPATIENT)
Dept: ANTEPARTUM | Facility: CLINIC | Age: 41
End: 2021-01-11
Payer: COMMERCIAL

## 2021-01-11 ENCOUNTER — ASOB RESULT (OUTPATIENT)
Age: 41
End: 2021-01-11

## 2021-01-11 ENCOUNTER — APPOINTMENT (OUTPATIENT)
Dept: OBGYN | Facility: CLINIC | Age: 41
End: 2021-01-11
Payer: COMMERCIAL

## 2021-01-11 VITALS
DIASTOLIC BLOOD PRESSURE: 78 MMHG | BODY MASS INDEX: 38.8 KG/M2 | HEIGHT: 68 IN | WEIGHT: 256 LBS | SYSTOLIC BLOOD PRESSURE: 118 MMHG

## 2021-01-11 PROCEDURE — 90715 TDAP VACCINE 7 YRS/> IM: CPT

## 2021-01-11 PROCEDURE — 76816 OB US FOLLOW-UP PER FETUS: CPT

## 2021-01-11 PROCEDURE — 99072 ADDL SUPL MATRL&STAF TM PHE: CPT

## 2021-01-11 PROCEDURE — 76817 TRANSVAGINAL US OBSTETRIC: CPT

## 2021-01-11 PROCEDURE — 0502F SUBSEQUENT PRENATAL CARE: CPT

## 2021-01-11 PROCEDURE — 90471 IMMUNIZATION ADMIN: CPT

## 2021-01-23 ENCOUNTER — TRANSCRIPTION ENCOUNTER (OUTPATIENT)
Age: 41
End: 2021-01-23

## 2021-01-29 ENCOUNTER — NON-APPOINTMENT (OUTPATIENT)
Age: 41
End: 2021-01-29

## 2021-01-29 ENCOUNTER — TRANSCRIPTION ENCOUNTER (OUTPATIENT)
Age: 41
End: 2021-01-29

## 2021-02-01 ENCOUNTER — APPOINTMENT (OUTPATIENT)
Dept: OBGYN | Facility: CLINIC | Age: 41
End: 2021-02-01

## 2021-02-01 ENCOUNTER — APPOINTMENT (OUTPATIENT)
Dept: ANTEPARTUM | Facility: CLINIC | Age: 41
End: 2021-02-01

## 2021-02-02 ENCOUNTER — APPOINTMENT (OUTPATIENT)
Dept: ANTEPARTUM | Facility: CLINIC | Age: 41
End: 2021-02-02

## 2021-02-02 ENCOUNTER — EMERGENCY (EMERGENCY)
Facility: HOSPITAL | Age: 41
LOS: 1 days | Discharge: ANOTHER HOSPITAL | End: 2021-02-02
Attending: EMERGENCY MEDICINE | Admitting: EMERGENCY MEDICINE
Payer: COMMERCIAL

## 2021-02-02 ENCOUNTER — TRANSCRIPTION ENCOUNTER (OUTPATIENT)
Age: 41
End: 2021-02-02

## 2021-02-02 ENCOUNTER — INPATIENT (INPATIENT)
Facility: HOSPITAL | Age: 41
LOS: 4 days | Discharge: HOME | End: 2021-02-07
Attending: OBSTETRICS & GYNECOLOGY | Admitting: STUDENT IN AN ORGANIZED HEALTH CARE EDUCATION/TRAINING PROGRAM
Payer: COMMERCIAL

## 2021-02-02 VITALS
OXYGEN SATURATION: 95 % | DIASTOLIC BLOOD PRESSURE: 62 MMHG | RESPIRATION RATE: 18 BRPM | HEART RATE: 105 BPM | SYSTOLIC BLOOD PRESSURE: 129 MMHG

## 2021-02-02 VITALS
RESPIRATION RATE: 22 BRPM | SYSTOLIC BLOOD PRESSURE: 136 MMHG | WEIGHT: 250 LBS | DIASTOLIC BLOOD PRESSURE: 71 MMHG | TEMPERATURE: 101 F | HEIGHT: 68 IN | HEART RATE: 119 BPM | OXYGEN SATURATION: 95 %

## 2021-02-02 VITALS
WEIGHT: 250 LBS | TEMPERATURE: 98 F | HEIGHT: 68 IN | RESPIRATION RATE: 20 BRPM | DIASTOLIC BLOOD PRESSURE: 81 MMHG | SYSTOLIC BLOOD PRESSURE: 139 MMHG | OXYGEN SATURATION: 94 % | HEART RATE: 114 BPM

## 2021-02-02 DIAGNOSIS — Z98.890 OTHER SPECIFIED POSTPROCEDURAL STATES: Chronic | ICD-10-CM

## 2021-02-02 LAB
BASE EXCESS BLDV CALC-SCNC: -4.3 MMOL/L — LOW (ref -2–2)
BASOPHILS # BLD AUTO: 0.02 K/UL — SIGNIFICANT CHANGE UP (ref 0–0.2)
BASOPHILS NFR BLD AUTO: 0.2 % — SIGNIFICANT CHANGE UP (ref 0–2)
CA-I SERPL-SCNC: 1.13 MMOL/L — SIGNIFICANT CHANGE UP (ref 1.12–1.3)
CHLORIDE BLDV-SCNC: 103 MMOL/L — SIGNIFICANT CHANGE UP (ref 96–108)
CO2 BLDV-SCNC: 20 MMOL/L — LOW (ref 22–30)
EOSINOPHIL # BLD AUTO: 0 K/UL — SIGNIFICANT CHANGE UP (ref 0–0.5)
EOSINOPHIL NFR BLD AUTO: 0 % — SIGNIFICANT CHANGE UP (ref 0–6)
GAS PNL BLDV: 130 MMOL/L — LOW (ref 135–145)
GAS PNL BLDV: SIGNIFICANT CHANGE UP
GAS PNL BLDV: SIGNIFICANT CHANGE UP
GLUCOSE BLDV-MCNC: 84 MG/DL — SIGNIFICANT CHANGE UP (ref 70–99)
HCO3 BLDV-SCNC: 19 MMOL/L — LOW (ref 21–29)
HCT VFR BLD CALC: 42.2 % — SIGNIFICANT CHANGE UP (ref 34.5–45)
HCT VFR BLDA CALC: 35 % — LOW (ref 39–50)
HGB BLD CALC-MCNC: 11.5 G/DL — SIGNIFICANT CHANGE UP (ref 11.5–15.5)
HGB BLD-MCNC: 14.4 G/DL — SIGNIFICANT CHANGE UP (ref 11.5–15.5)
IMM GRANULOCYTES NFR BLD AUTO: 1.5 % — SIGNIFICANT CHANGE UP (ref 0–1.5)
LACTATE BLDV-MCNC: 1.4 MMOL/L — SIGNIFICANT CHANGE UP (ref 0.7–2)
LYMPHOCYTES # BLD AUTO: 1.78 K/UL — SIGNIFICANT CHANGE UP (ref 1–3.3)
LYMPHOCYTES # BLD AUTO: 17.4 % — SIGNIFICANT CHANGE UP (ref 13–44)
MCHC RBC-ENTMCNC: 28.9 PG — SIGNIFICANT CHANGE UP (ref 27–34)
MCHC RBC-ENTMCNC: 34.1 GM/DL — SIGNIFICANT CHANGE UP (ref 32–36)
MCV RBC AUTO: 84.7 FL — SIGNIFICANT CHANGE UP (ref 80–100)
MONOCYTES # BLD AUTO: 0.65 K/UL — SIGNIFICANT CHANGE UP (ref 0–0.9)
MONOCYTES NFR BLD AUTO: 6.4 % — SIGNIFICANT CHANGE UP (ref 2–14)
NEUTROPHILS # BLD AUTO: 7.62 K/UL — HIGH (ref 1.8–7.4)
NEUTROPHILS NFR BLD AUTO: 74.5 % — SIGNIFICANT CHANGE UP (ref 43–77)
NRBC # BLD: 0 /100 WBCS — SIGNIFICANT CHANGE UP (ref 0–0)
PCO2 BLDV: 32 MMHG — LOW (ref 35–50)
PH BLDV: 7.4 — SIGNIFICANT CHANGE UP (ref 7.35–7.45)
PLATELET # BLD AUTO: 283 K/UL — SIGNIFICANT CHANGE UP (ref 150–400)
PO2 BLDV: 35 MMHG — SIGNIFICANT CHANGE UP (ref 25–45)
POTASSIUM BLDV-SCNC: 3.5 MMOL/L — SIGNIFICANT CHANGE UP (ref 3.5–5.3)
RBC # BLD: 4.98 M/UL — SIGNIFICANT CHANGE UP (ref 3.8–5.2)
RBC # FLD: 12.9 % — SIGNIFICANT CHANGE UP (ref 10.3–14.5)
SAO2 % BLDV: 62 % — LOW (ref 67–88)
WBC # BLD: 10.22 K/UL — SIGNIFICANT CHANGE UP (ref 3.8–10.5)
WBC # FLD AUTO: 10.22 K/UL — SIGNIFICANT CHANGE UP (ref 3.8–10.5)

## 2021-02-02 PROCEDURE — 93010 ELECTROCARDIOGRAM REPORT: CPT

## 2021-02-02 PROCEDURE — 99285 EMERGENCY DEPT VISIT HI MDM: CPT

## 2021-02-02 PROCEDURE — 99284 EMERGENCY DEPT VISIT MOD MDM: CPT

## 2021-02-02 PROCEDURE — 99285 EMERGENCY DEPT VISIT HI MDM: CPT | Mod: 25

## 2021-02-02 RX ORDER — DEXAMETHASONE 0.5 MG/5ML
6 ELIXIR ORAL ONCE
Refills: 0 | Status: COMPLETED | OUTPATIENT
Start: 2021-02-02 | End: 2021-02-02

## 2021-02-02 RX ORDER — ACETAMINOPHEN 500 MG
650 TABLET ORAL ONCE
Refills: 0 | Status: COMPLETED | OUTPATIENT
Start: 2021-02-02 | End: 2021-02-02

## 2021-02-02 RX ORDER — SODIUM CHLORIDE 9 MG/ML
1000 INJECTION, SOLUTION INTRAVENOUS ONCE
Refills: 0 | Status: COMPLETED | OUTPATIENT
Start: 2021-02-02 | End: 2021-02-02

## 2021-02-02 RX ADMIN — Medication 650 MILLIGRAM(S): at 23:23

## 2021-02-02 RX ADMIN — SODIUM CHLORIDE 1000 MILLILITER(S): 9 INJECTION, SOLUTION INTRAVENOUS at 23:23

## 2021-02-02 RX ADMIN — Medication 6 MILLIGRAM(S): at 23:53

## 2021-02-02 NOTE — ED PROVIDER NOTE - OBJECTIVE STATEMENT
39 yo F p/w , 36 weeks p/w shortness of breath and cough x 5 days. Pt was tested for COVID on  when his son's teacher was tested positive. She has been having cough and worsening SOB for 5 days, which prompted her to come to the ED today. has a pulse ox at home and has been 94-95%. Has been feeling "warm" and at home, temperature was 100.4. her cough has been dry/nonproductive, but limits her ability to take ful deep breaths. Additionally, patient has runny nose abd body aches. She went to [facility] ED and was transferred to [facility] for ob eval. Denies any history of miscarriage, no personal or family history of clots. Pregnancy has been uncomplicated, and pt is not on any medications.     Primary OB-Dr. Adair

## 2021-02-02 NOTE — ED PROVIDER NOTE - PROGRESS NOTE DETAILS
Ortiz Card, PGY2: Informed that patient desaturated to 92% with worsening SOB, and then placed on 4L NC with improved to 97-99%. Will likely admit pt.

## 2021-02-02 NOTE — ED PROVIDER NOTE - CARE PLAN
Principal Discharge DX:	Acute hypoxemic respiratory failure due to COVID-19  Secondary Diagnosis:	Pregnant

## 2021-02-02 NOTE — CHART NOTE - NSCHARTNOTEFT_GEN_A_CORE
Gyn Chart Note    Pt transferred from [facility] for COVID+ w/ symptoms of shortness of breath and cough.  ED plan for basic covid labs and monitoring.  No obstetric complaints or clinical questions.  Labor and delivery team at bedside to perform NST.    Cassidy Meade PGY2 Gyn Chart Note    Pt transferred from [facility] for COVID+ w/ symptoms of shortness of breath and cough.  ED plan for basic covid labs and monitoring.  No obstetric complaints or clinical questions.  Labor and delivery team at bedside to perform NST.    Cassidy Meade PGY2    R2 Addendum    NST reactive for gestational age, baseline 150 w/ mod variability, +accels, no decels, no ctx.  Pt to be seen tomorrow for official ultrasound w/ BPP Gyn Chart Note    Pt transferred from [facility] for COVID+ w/ symptoms of shortness of breath and cough.  ED plan for basic covid labs and monitoring.  No obstetric complaints or clinical questions.  Labor and delivery team at bedside to perform NST.    Cassidy Meade PGY2    R2 Addendum    NST reactive for gestational age, baseline 150 w/ mod variability, +accels, no decels, no ctx.  Pt to be seen tomorrow for official ultrasound w/ BPP.  Contact OB at [phone] if patient has acutely increased O2 demands.

## 2021-02-02 NOTE — ED ADULT NURSE NOTE - NS ED NOTE ABUSE RESPONSE YN
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
Yes

## 2021-02-02 NOTE — ED ADULT TRIAGE NOTE - CHIEF COMPLAINT QUOTE
EMS states patient is transfer from [place] for OBGYN consultation. Pt states she has worsening difficulty breathing, cough, headache, and decreased PO intake, and went to [place]. Pt had COVID + test on 1/23/2021. Pt is 31 weeks pregnant.

## 2021-02-02 NOTE — ED PROVIDER NOTE - CHIEF COMPLAINT
The patient is a 40y Female complaining of  The patient is a 40y Female complaining of shortness of breath and cough

## 2021-02-02 NOTE — ED PROVIDER NOTE - CLINICAL SUMMARY MEDICAL DECISION MAKING FREE TEXT BOX
Impression/Plan:  suspect cause of cough/SOB most likely COVID URI.  DDx for SOB/cough in advanced pregnancy always includes PE.  However, there is an alternative diagnosis that is more likely (COVID-URI).  DDimer is unlikely to be useful in that decision making process.  As such, we feel that her tachycardia is most likely due to dehydration and COVID infection.  If her HR does not improve with IVF/APAP, then we may need to move Pulmonary embolism higher up the differential diagnosis.    From pregnancy standpoint, the patient will get an NST (OB at bedside setting it up at this time).

## 2021-02-02 NOTE — ED PROVIDER NOTE - ATTENDING CONTRIBUTION TO CARE
MD Brizuela:  patient seen and evaluated with the resident.  I was present for key portions of the History & Physical, and I agree with the Impression & Plan.  MD Brizuela:  39 yo F, c/o cough/SOB, congestion.  Duration 5days.    Context:  @ 36 wk 1day; presented to OSH with same Sx; sent to [facility] for NST and further COVID eval.  No labs/imaging done at [place].    Associated Sx: +subjective fevers, +body aches, +runny nose.  +Fetal movement, no VB, no cramping.   VS: .  Physical Exam: adult F, +dry-sounding cough, NCAT, PERRL, EOMI, neck supple, tachy, trace bibasilar rales.  Abd: s/nd/+gravid. Ext: no edema, Neuro: AAOx3, ambulates w/o diff, strength 5/5 & symmetric throughout.  Impression/Plan:  suspect cause of cough/SOB most likely COVID URI.  DDx for SOB/cough in advanced pregnancy always includes PE.  However, there is an alternative diagnosis that is more likely (COVID-URI).  DDimer is unlikely to be useful in that decision making process.  As such, we feel that her tachycardia is most likely due to dehydration and COVID infection.  If her HR does not improve with IVF/APAP, then we may need to move Pulmonary embolism higher up the differential diagnosis.    From pregnancy standpoint, the patient will get an NST (OB at bedside setting it up at this time). MD Brizuela:  patient seen and evaluated with the resident.  I was present for key portions of the History & Physical, and I agree with the Impression & Plan.  MD Brizuela:  39 yo F, c/o cough/SOB, congestion.  Duration 5days.    Context:  @ 36 wk 1day; presented to OSH with same Sx; sent to [facility] for NST and further COVID eval.  No labs/imaging done at [place].    Associated Sx: +subjective fevers, +body aches, +runny nose.  +Fetal movement, no VB, no cramping.   VS: , RR 32, O2 92% on RA--> 100% on 4L with improved respiratory effort.    Physical Exam: adult F, gravid, +dry-sounding cough, NCAT, PERRL, EOMI, neck supple, tachy, trace bibasilar rales.  Abd: s/nd/+gravid, nontender. Ext: no edema, Neuro: AAOx3, ambulates w/o diff, strength 5/5 & symmetric throughout.  Impression/Plan:  suspect cause of cough/SOB most likely COVID URI.  Patient with mild hypoxia 92% on RA with RR 32, warrants admit for supplemental O2 and monitoring.  DDx for SOB/cough in advanced pregnancy always includes PE.  However, there is an alternative diagnosis that is more likely (COVID-URI).  DDimer is unlikely to be useful in that decision making process.  As such, we feel that her tachycardia is most likely due to dehydration and COVID infection.  If her HR does not improve with IVF/APAP, then we may need to move Pulmonary embolism higher up the differential diagnosis.    From pregnancy standpoint, the patient will get an NST (OB at bedside setting it up at this time).

## 2021-02-02 NOTE — ED PROVIDER NOTE - PROGRESS NOTE DETAILS
[facility] Covering OB - Dr Rachel Ghotra - states should xfer to [facility] for eval, she will accept. [phone]  [facility] - will arrange transfer [facility] center- wants pt to ED. [facility] - Dr Tapia , accepts [facility]

## 2021-02-02 NOTE — ED PROVIDER NOTE - OBJECTIVE STATEMENT
41 yo F p/w , EDC 3, 36 1/7 weeks p/w has been sick, cough, congestion and mild dyspnea x past ~ 5 - 7 days. Pt with some persistent dyspnea today, came to ed for eval. Pt with known COVID infxn - diag  (when she was asymptomatic). No chest  pain. no abd pain. no n/v/d. No VB / vag dc. Pt still feeling normal baby movement. No neck / back pain. no dysuria/  hematuria. No recent trauma. no agg/allev factors. No other inj or co.

## 2021-02-02 NOTE — ED ADULT NURSE NOTE - OBJECTIVE STATEMENT
41 y/o female  no significant PMHx arrives to [facility] ED by EMS from [facility] with c/o difficulty breathing. Patient 36 weeks pregnant, covid+ test 21, 1 week of worsening symptoms. Patient has increased difficulty breathing, nonproductive cough, decreased PO intake, feeling dehydrated, and headache. Patient is A&Ox4. Increased work of breathing on RA, placed on NC. EKG done, placed on CM, sinus tachy. No abdominal pain. No n/v/d. Denies urinary symptoms. Denies fever/chills. Skin is intact, redness noted to upper back and chest.

## 2021-02-02 NOTE — ED ADULT NURSE REASSESSMENT NOTE - NS ED NURSE REASSESS COMMENT FT1
OB at bedside, patient on FHR monitor. Patient made aware pending urine specimen collection. Will continue to monitor.

## 2021-02-02 NOTE — ED PROVIDER NOTE - CARE PLAN
Principal Discharge DX:	36 weeks gestation of pregnancy  Secondary Diagnosis:	COVID-19  Secondary Diagnosis:	Shortness of breath

## 2021-02-02 NOTE — ED ADULT NURSE NOTE - OBJECTIVE STATEMENT
received pt in bed #12a Pt A&O states she is 36 weeks pregnant dx w/ covid 1/23 & c/o SOB that is worse on exertion& dry cough x couple days O2 sat on RA 95%

## 2021-02-02 NOTE — ED ADULT NURSE NOTE - CHIEF COMPLAINT QUOTE
EMS states patient is transfer from [place] for OBGYN consultation. Pt states she has worsening difficulty breathing, cough, headache, and decreased PO intake, and went to [place]. Pt had COVID + test on 1/23/2021. Pt is 31 weeks pregnant.

## 2021-02-02 NOTE — ED ADULT NURSE NOTE - BREATHING, MLM
Quality 110: Preventive Care And Screening: Influenza Immunization: Influenza Immunization Administered during Influenza season Tachypnea Detail Level: Detailed

## 2021-02-03 DIAGNOSIS — Z29.9 ENCOUNTER FOR PROPHYLACTIC MEASURES, UNSPECIFIED: ICD-10-CM

## 2021-02-03 DIAGNOSIS — U07.1 COVID-19: ICD-10-CM

## 2021-02-03 DIAGNOSIS — Z02.9 ENCOUNTER FOR ADMINISTRATIVE EXAMINATIONS, UNSPECIFIED: ICD-10-CM

## 2021-02-03 DIAGNOSIS — Z34.90 ENCOUNTER FOR SUPERVISION OF NORMAL PREGNANCY, UNSPECIFIED, UNSPECIFIED TRIMESTER: ICD-10-CM

## 2021-02-03 LAB
ALBUMIN SERPL ELPH-MCNC: 3 G/DL — LOW (ref 3.3–5)
ALBUMIN SERPL ELPH-MCNC: 3 G/DL — LOW (ref 3.3–5)
ALP SERPL-CCNC: 165 U/L — HIGH (ref 40–120)
ALP SERPL-CCNC: 170 U/L — HIGH (ref 40–120)
ALT FLD-CCNC: 20 U/L — SIGNIFICANT CHANGE UP (ref 10–45)
ALT FLD-CCNC: 22 U/L — SIGNIFICANT CHANGE UP (ref 10–45)
ANION GAP SERPL CALC-SCNC: 14 MMOL/L — SIGNIFICANT CHANGE UP (ref 5–17)
ANION GAP SERPL CALC-SCNC: 15 MMOL/L — SIGNIFICANT CHANGE UP (ref 5–17)
APPEARANCE UR: ABNORMAL
APPEARANCE UR: ABNORMAL
APTT BLD: 23.2 SEC — LOW (ref 27.5–35.5)
AST SERPL-CCNC: 32 U/L — SIGNIFICANT CHANGE UP (ref 10–40)
AST SERPL-CCNC: 36 U/L — SIGNIFICANT CHANGE UP (ref 10–40)
BACTERIA # UR AUTO: ABNORMAL
BACTERIA # UR AUTO: ABNORMAL
BASOPHILS # BLD AUTO: 0.01 K/UL — SIGNIFICANT CHANGE UP (ref 0–0.2)
BASOPHILS NFR BLD AUTO: 0.1 % — SIGNIFICANT CHANGE UP (ref 0–2)
BILIRUB SERPL-MCNC: 0.6 MG/DL — SIGNIFICANT CHANGE UP (ref 0.2–1.2)
BILIRUB SERPL-MCNC: 0.6 MG/DL — SIGNIFICANT CHANGE UP (ref 0.2–1.2)
BILIRUB UR-MCNC: NEGATIVE — SIGNIFICANT CHANGE UP
BILIRUB UR-MCNC: NEGATIVE — SIGNIFICANT CHANGE UP
BLD GP AB SCN SERPL QL: NEGATIVE — SIGNIFICANT CHANGE UP
BUN SERPL-MCNC: 4 MG/DL — LOW (ref 7–23)
BUN SERPL-MCNC: 5 MG/DL — LOW (ref 7–23)
CALCIUM SERPL-MCNC: 8.5 MG/DL — SIGNIFICANT CHANGE UP (ref 8.4–10.5)
CALCIUM SERPL-MCNC: 8.7 MG/DL — SIGNIFICANT CHANGE UP (ref 8.4–10.5)
CHLORIDE SERPL-SCNC: 102 MMOL/L — SIGNIFICANT CHANGE UP (ref 96–108)
CHLORIDE SERPL-SCNC: 99 MMOL/L — SIGNIFICANT CHANGE UP (ref 96–108)
CO2 SERPL-SCNC: 16 MMOL/L — LOW (ref 22–31)
CO2 SERPL-SCNC: 17 MMOL/L — LOW (ref 22–31)
COLOR SPEC: YELLOW — SIGNIFICANT CHANGE UP
COLOR SPEC: YELLOW — SIGNIFICANT CHANGE UP
COMMENT - URINE: SIGNIFICANT CHANGE UP
CREAT SERPL-MCNC: 0.66 MG/DL — SIGNIFICANT CHANGE UP (ref 0.5–1.3)
CREAT SERPL-MCNC: 0.7 MG/DL — SIGNIFICANT CHANGE UP (ref 0.5–1.3)
CRP SERPL-MCNC: 5.8 MG/DL — HIGH (ref 0–0.4)
D DIMER BLD IA.RAPID-MCNC: 566 NG/ML DDU — HIGH
DIFF PNL FLD: NEGATIVE — SIGNIFICANT CHANGE UP
DIFF PNL FLD: NEGATIVE — SIGNIFICANT CHANGE UP
EOSINOPHIL # BLD AUTO: 0 K/UL — SIGNIFICANT CHANGE UP (ref 0–0.5)
EOSINOPHIL NFR BLD AUTO: 0 % — SIGNIFICANT CHANGE UP (ref 0–6)
EPI CELLS # UR: 5 /HPF — SIGNIFICANT CHANGE UP
EPI CELLS # UR: 9 /HPF — HIGH
FERRITIN SERPL-MCNC: 122 NG/ML — SIGNIFICANT CHANGE UP (ref 15–150)
GLUCOSE SERPL-MCNC: 123 MG/DL — HIGH (ref 70–99)
GLUCOSE SERPL-MCNC: 83 MG/DL — SIGNIFICANT CHANGE UP (ref 70–99)
GLUCOSE UR QL: NEGATIVE — SIGNIFICANT CHANGE UP
GLUCOSE UR QL: NEGATIVE — SIGNIFICANT CHANGE UP
HCT VFR BLD CALC: 42 % — SIGNIFICANT CHANGE UP (ref 34.5–45)
HGB BLD-MCNC: 14.1 G/DL — SIGNIFICANT CHANGE UP (ref 11.5–15.5)
HYALINE CASTS # UR AUTO: 1 /LPF — SIGNIFICANT CHANGE UP (ref 0–2)
HYALINE CASTS # UR AUTO: 6 /LPF — HIGH (ref 0–2)
IMM GRANULOCYTES NFR BLD AUTO: 1.9 % — HIGH (ref 0–1.5)
INR BLD: 0.85 RATIO — LOW (ref 0.88–1.16)
KETONES UR-MCNC: ABNORMAL
KETONES UR-MCNC: ABNORMAL
LEUKOCYTE ESTERASE UR-ACNC: NEGATIVE — SIGNIFICANT CHANGE UP
LEUKOCYTE ESTERASE UR-ACNC: NEGATIVE — SIGNIFICANT CHANGE UP
LYMPHOCYTES # BLD AUTO: 1.17 K/UL — SIGNIFICANT CHANGE UP (ref 1–3.3)
LYMPHOCYTES # BLD AUTO: 13.2 % — SIGNIFICANT CHANGE UP (ref 13–44)
MAGNESIUM SERPL-MCNC: 2 MG/DL — SIGNIFICANT CHANGE UP (ref 1.6–2.6)
MCHC RBC-ENTMCNC: 28.9 PG — SIGNIFICANT CHANGE UP (ref 27–34)
MCHC RBC-ENTMCNC: 33.6 GM/DL — SIGNIFICANT CHANGE UP (ref 32–36)
MCV RBC AUTO: 86.1 FL — SIGNIFICANT CHANGE UP (ref 80–100)
MONOCYTES # BLD AUTO: 0.24 K/UL — SIGNIFICANT CHANGE UP (ref 0–0.9)
MONOCYTES NFR BLD AUTO: 2.7 % — SIGNIFICANT CHANGE UP (ref 2–14)
NEUTROPHILS # BLD AUTO: 7.26 K/UL — SIGNIFICANT CHANGE UP (ref 1.8–7.4)
NEUTROPHILS NFR BLD AUTO: 82.1 % — HIGH (ref 43–77)
NITRITE UR-MCNC: NEGATIVE — SIGNIFICANT CHANGE UP
NITRITE UR-MCNC: NEGATIVE — SIGNIFICANT CHANGE UP
NRBC # BLD: 0 /100 WBCS — SIGNIFICANT CHANGE UP (ref 0–0)
PH UR: 6 — SIGNIFICANT CHANGE UP (ref 5–8)
PH UR: 6.5 — SIGNIFICANT CHANGE UP (ref 5–8)
PHOSPHATE SERPL-MCNC: 3.6 MG/DL — SIGNIFICANT CHANGE UP (ref 2.5–4.5)
PLATELET # BLD AUTO: 247 K/UL — SIGNIFICANT CHANGE UP (ref 150–400)
POTASSIUM SERPL-MCNC: 3.7 MMOL/L — SIGNIFICANT CHANGE UP (ref 3.5–5.3)
POTASSIUM SERPL-MCNC: 4 MMOL/L — SIGNIFICANT CHANGE UP (ref 3.5–5.3)
POTASSIUM SERPL-SCNC: 3.7 MMOL/L — SIGNIFICANT CHANGE UP (ref 3.5–5.3)
POTASSIUM SERPL-SCNC: 4 MMOL/L — SIGNIFICANT CHANGE UP (ref 3.5–5.3)
PROT SERPL-MCNC: 6.1 G/DL — SIGNIFICANT CHANGE UP (ref 6–8.3)
PROT SERPL-MCNC: 6.7 G/DL — SIGNIFICANT CHANGE UP (ref 6–8.3)
PROT UR-MCNC: ABNORMAL
PROT UR-MCNC: SIGNIFICANT CHANGE UP
PROTHROM AB SERPL-ACNC: 10.3 SEC — LOW (ref 10.6–13.6)
RBC # BLD: 4.88 M/UL — SIGNIFICANT CHANGE UP (ref 3.8–5.2)
RBC # FLD: 12.9 % — SIGNIFICANT CHANGE UP (ref 10.3–14.5)
RBC CASTS # UR COMP ASSIST: 1 /HPF — SIGNIFICANT CHANGE UP (ref 0–4)
RBC CASTS # UR COMP ASSIST: 1 /HPF — SIGNIFICANT CHANGE UP (ref 0–4)
RH IG SCN BLD-IMP: POSITIVE — SIGNIFICANT CHANGE UP
RH IG SCN BLD-IMP: POSITIVE — SIGNIFICANT CHANGE UP
SARS-COV-2 RNA SPEC QL NAA+PROBE: DETECTED
SODIUM SERPL-SCNC: 130 MMOL/L — LOW (ref 135–145)
SODIUM SERPL-SCNC: 133 MMOL/L — LOW (ref 135–145)
SP GR SPEC: 1.01 — LOW (ref 1.01–1.02)
SP GR SPEC: 1.02 — SIGNIFICANT CHANGE UP (ref 1.01–1.02)
TRI-PHOS CRY UR QL COMP ASSIST: NEGATIVE — SIGNIFICANT CHANGE UP
UROBILINOGEN FLD QL: ABNORMAL
UROBILINOGEN FLD QL: NEGATIVE — SIGNIFICANT CHANGE UP
WBC # BLD: 8.85 K/UL — SIGNIFICANT CHANGE UP (ref 3.8–10.5)
WBC # FLD AUTO: 8.85 K/UL — SIGNIFICANT CHANGE UP (ref 3.8–10.5)
WBC UR QL: 10 /HPF — HIGH (ref 0–5)
WBC UR QL: 6 /HPF — HIGH (ref 0–5)

## 2021-02-03 PROCEDURE — 59510 CESAREAN DELIVERY: CPT

## 2021-02-03 PROCEDURE — 71275 CT ANGIOGRAPHY CHEST: CPT | Mod: 26

## 2021-02-03 PROCEDURE — 88307 TISSUE EXAM BY PATHOLOGIST: CPT | Mod: 26

## 2021-02-03 PROCEDURE — 71045 X-RAY EXAM CHEST 1 VIEW: CPT | Mod: 26

## 2021-02-03 PROCEDURE — 12345: CPT | Mod: NC

## 2021-02-03 PROCEDURE — 99223 1ST HOSP IP/OBS HIGH 75: CPT

## 2021-02-03 PROCEDURE — 99223 1ST HOSP IP/OBS HIGH 75: CPT | Mod: GC

## 2021-02-03 RX ORDER — SODIUM CHLORIDE 9 MG/ML
1000 INJECTION, SOLUTION INTRAVENOUS ONCE
Refills: 0 | Status: COMPLETED | OUTPATIENT
Start: 2021-02-03 | End: 2021-02-03

## 2021-02-03 RX ORDER — OXYCODONE HYDROCHLORIDE 5 MG/1
5 TABLET ORAL
Refills: 0 | Status: DISCONTINUED | OUTPATIENT
Start: 2021-02-03 | End: 2021-02-04

## 2021-02-03 RX ORDER — ENOXAPARIN SODIUM 100 MG/ML
40 INJECTION SUBCUTANEOUS DAILY
Refills: 0 | Status: DISCONTINUED | OUTPATIENT
Start: 2021-02-03 | End: 2021-02-03

## 2021-02-03 RX ORDER — MORPHINE SULFATE 50 MG/1
0.1 CAPSULE, EXTENDED RELEASE ORAL ONCE
Refills: 0 | Status: DISCONTINUED | OUTPATIENT
Start: 2021-02-03 | End: 2021-02-04

## 2021-02-03 RX ORDER — ENOXAPARIN SODIUM 100 MG/ML
40 INJECTION SUBCUTANEOUS
Refills: 0 | Status: DISCONTINUED | OUTPATIENT
Start: 2021-02-03 | End: 2021-02-03

## 2021-02-03 RX ORDER — MAGNESIUM HYDROXIDE 400 MG/1
30 TABLET, CHEWABLE ORAL
Refills: 0 | Status: DISCONTINUED | OUTPATIENT
Start: 2021-02-03 | End: 2021-02-05

## 2021-02-03 RX ORDER — NALOXONE HYDROCHLORIDE 4 MG/.1ML
0.1 SPRAY NASAL
Refills: 0 | Status: DISCONTINUED | OUTPATIENT
Start: 2021-02-03 | End: 2021-02-07

## 2021-02-03 RX ORDER — REMDESIVIR 5 MG/ML
200 INJECTION INTRAVENOUS EVERY 24 HOURS
Refills: 0 | Status: DISCONTINUED | OUTPATIENT
Start: 2021-02-03 | End: 2021-02-03

## 2021-02-03 RX ORDER — ACETAMINOPHEN 500 MG
975 TABLET ORAL
Refills: 0 | Status: DISCONTINUED | OUTPATIENT
Start: 2021-02-03 | End: 2021-02-04

## 2021-02-03 RX ORDER — OXYCODONE HYDROCHLORIDE 5 MG/1
5 TABLET ORAL ONCE
Refills: 0 | Status: DISCONTINUED | OUTPATIENT
Start: 2021-02-03 | End: 2021-02-04

## 2021-02-03 RX ORDER — REMDESIVIR 5 MG/ML
200 INJECTION INTRAVENOUS EVERY 24 HOURS
Refills: 0 | Status: COMPLETED | OUTPATIENT
Start: 2021-02-03 | End: 2021-02-03

## 2021-02-03 RX ORDER — IBUPROFEN 200 MG
600 TABLET ORAL EVERY 6 HOURS
Refills: 0 | Status: DISCONTINUED | OUTPATIENT
Start: 2021-02-03 | End: 2021-02-04

## 2021-02-03 RX ORDER — DIPHENHYDRAMINE HCL 50 MG
25 CAPSULE ORAL EVERY 6 HOURS
Refills: 0 | Status: DISCONTINUED | OUTPATIENT
Start: 2021-02-03 | End: 2021-02-04

## 2021-02-03 RX ORDER — HEPARIN SODIUM 5000 [USP'U]/ML
5000 INJECTION INTRAVENOUS; SUBCUTANEOUS EVERY 12 HOURS
Refills: 0 | Status: DISCONTINUED | OUTPATIENT
Start: 2021-02-03 | End: 2021-02-04

## 2021-02-03 RX ORDER — DEXAMETHASONE 0.5 MG/5ML
6 ELIXIR ORAL EVERY 24 HOURS
Refills: 0 | Status: DISCONTINUED | OUTPATIENT
Start: 2021-02-03 | End: 2021-02-03

## 2021-02-03 RX ORDER — REMDESIVIR 5 MG/ML
INJECTION INTRAVENOUS
Refills: 0 | Status: COMPLETED | OUTPATIENT
Start: 2021-02-03 | End: 2021-02-07

## 2021-02-03 RX ORDER — BUTORPHANOL TARTRATE 2 MG/ML
0.12 INJECTION, SOLUTION INTRAMUSCULAR; INTRAVENOUS EVERY 6 HOURS
Refills: 0 | Status: DISCONTINUED | OUTPATIENT
Start: 2021-02-03 | End: 2021-02-04

## 2021-02-03 RX ORDER — LANOLIN
1 OINTMENT (GRAM) TOPICAL EVERY 6 HOURS
Refills: 0 | Status: DISCONTINUED | OUTPATIENT
Start: 2021-02-03 | End: 2021-02-07

## 2021-02-03 RX ORDER — OXYTOCIN 10 UNIT/ML
333.33 VIAL (ML) INJECTION
Qty: 20 | Refills: 0 | Status: DISCONTINUED | OUTPATIENT
Start: 2021-02-03 | End: 2021-02-04

## 2021-02-03 RX ORDER — DEXAMETHASONE 0.5 MG/5ML
6 ELIXIR ORAL EVERY 24 HOURS
Refills: 0 | Status: DISCONTINUED | OUTPATIENT
Start: 2021-02-03 | End: 2021-02-07

## 2021-02-03 RX ORDER — FAMOTIDINE 10 MG/ML
20 INJECTION INTRAVENOUS ONCE
Refills: 0 | Status: COMPLETED | OUTPATIENT
Start: 2021-02-03 | End: 2021-02-03

## 2021-02-03 RX ORDER — REMDESIVIR 5 MG/ML
100 INJECTION INTRAVENOUS EVERY 24 HOURS
Refills: 0 | Status: COMPLETED | OUTPATIENT
Start: 2021-02-04 | End: 2021-02-07

## 2021-02-03 RX ORDER — KETOROLAC TROMETHAMINE 30 MG/ML
30 SYRINGE (ML) INJECTION EVERY 6 HOURS
Refills: 0 | Status: DISCONTINUED | OUTPATIENT
Start: 2021-02-03 | End: 2021-02-04

## 2021-02-03 RX ORDER — SODIUM CHLORIDE 9 MG/ML
1000 INJECTION, SOLUTION INTRAVENOUS
Refills: 0 | Status: DISCONTINUED | OUTPATIENT
Start: 2021-02-03 | End: 2021-02-04

## 2021-02-03 RX ORDER — METOCLOPRAMIDE HCL 10 MG
10 TABLET ORAL ONCE
Refills: 0 | Status: DISCONTINUED | OUTPATIENT
Start: 2021-02-03 | End: 2021-02-04

## 2021-02-03 RX ORDER — ACETAMINOPHEN 500 MG
650 TABLET ORAL EVERY 6 HOURS
Refills: 0 | Status: DISCONTINUED | OUTPATIENT
Start: 2021-02-03 | End: 2021-02-05

## 2021-02-03 RX ORDER — REMDESIVIR 5 MG/ML
INJECTION INTRAVENOUS
Refills: 0 | Status: DISCONTINUED | OUTPATIENT
Start: 2021-02-03 | End: 2021-02-03

## 2021-02-03 RX ORDER — SIMETHICONE 80 MG/1
80 TABLET, CHEWABLE ORAL EVERY 4 HOURS
Refills: 0 | Status: DISCONTINUED | OUTPATIENT
Start: 2021-02-03 | End: 2021-02-05

## 2021-02-03 RX ORDER — CITRIC ACID/SODIUM CITRATE 300-500 MG
15 SOLUTION, ORAL ORAL ONCE
Refills: 0 | Status: COMPLETED | OUTPATIENT
Start: 2021-02-03 | End: 2021-02-03

## 2021-02-03 RX ORDER — TETANUS TOXOID, REDUCED DIPHTHERIA TOXOID AND ACELLULAR PERTUSSIS VACCINE, ADSORBED 5; 2.5; 8; 8; 2.5 [IU]/.5ML; [IU]/.5ML; UG/.5ML; UG/.5ML; UG/.5ML
0.5 SUSPENSION INTRAMUSCULAR ONCE
Refills: 0 | Status: DISCONTINUED | OUTPATIENT
Start: 2021-02-03 | End: 2021-02-04

## 2021-02-03 RX ORDER — OXYCODONE HYDROCHLORIDE 5 MG/1
10 TABLET ORAL
Refills: 0 | Status: DISCONTINUED | OUTPATIENT
Start: 2021-02-03 | End: 2021-02-04

## 2021-02-03 RX ORDER — DEXAMETHASONE 0.5 MG/5ML
4 ELIXIR ORAL EVERY 6 HOURS
Refills: 0 | Status: DISCONTINUED | OUTPATIENT
Start: 2021-02-03 | End: 2021-02-04

## 2021-02-03 RX ORDER — ONDANSETRON 8 MG/1
4 TABLET, FILM COATED ORAL EVERY 6 HOURS
Refills: 0 | Status: DISCONTINUED | OUTPATIENT
Start: 2021-02-03 | End: 2021-02-04

## 2021-02-03 RX ADMIN — Medication 15 MILLILITER(S): at 16:17

## 2021-02-03 RX ADMIN — Medication 1000 MILLIUNIT(S)/MIN: at 18:37

## 2021-02-03 RX ADMIN — FAMOTIDINE 20 MILLIGRAM(S): 10 INJECTION INTRAVENOUS at 16:18

## 2021-02-03 RX ADMIN — REMDESIVIR 500 MILLIGRAM(S): 5 INJECTION INTRAVENOUS at 12:48

## 2021-02-03 RX ADMIN — SODIUM CHLORIDE 2000 MILLILITER(S): 9 INJECTION, SOLUTION INTRAVENOUS at 16:00

## 2021-02-03 RX ADMIN — Medication 975 MILLIGRAM(S): at 20:28

## 2021-02-03 RX ADMIN — Medication 30 MILLIGRAM(S): at 17:35

## 2021-02-03 NOTE — OB RN PATIENT PROFILE - NSSTATEDREASONFORADM_OBGYN_A_OB_FT
maternal COVID+ symptomatic SOB at times on 4LNC - [facility]

## 2021-02-03 NOTE — H&P ADULT - HISTORY OF PRESENT ILLNESS
41 yo F p/w , 36 weeks p/w shortness of breath and cough x 5 days. Pt was tested for COVID on  when his son's teacher was tested positive. She has been having cough and worsening SOB for 5 days, which prompted her to come to the ED today. has a pulse ox at home and has been 94-95%. Has been feeling "warm" and at home, temperature was 100.4. her cough has been dry/nonproductive, but limits her ability to take full deep breaths. Additionally, patient has runny nose abd body aches. She went to [place] ED and was transferred to [facility] for obstetric eval. Denies any history of miscarriage, no personal or family history of clots. Pregnancy has been uncomplicated, and pt is not on any medications.     Primary OB-Dr. Adair 41 yo  at 36 weeks gestation p/w shortness of breath and cough x 5 days. Pt was tested for COVID on  when his son's teacher was tested positive. She has been having cough and worsening SOB for 5 days, which prompted her to come to the ED today. Has a pulse ox at home and has been 94-95%. Has been feeling "warm" and at home, temperature was 100.4. her cough has been dry/nonproductive, but limits her ability to take full deep breaths. Additionally, patient has runny nose abd body aches. She went to [place] ED and was transferred to [facility] for obstetric eval. Denies any history of miscarriage, no personal or family history of clots. Pregnancy has been uncomplicated, and pt is not on any medications except prenatal vitamin    Primary OB-Dr. Adair    In ED, on 4L satting 97%, vitals otherwise stable. Given Decadron x 1.

## 2021-02-03 NOTE — CONSULT NOTE ADULT - ASSESSMENT
40 f 36 pregnant, tested positive for COVID 1/23 when all family had COVID but she was asymptomatic now p/w worsening dyspnea and cough for about 10 days  here febrile to 100.5, tachypneic, requiring 4 liters O2  WBC normal, D-Dimer: 566, CRP: 5.8  CXR: b/l haziness    fever, hypoxic respiratory distress due to COVID pneumonia in a 36 w pregnant pt    * start remdesivir for a 5 day course  * c/w dexa 6 qd for up to a 10 day course  * monitor the renal function and LFTs  * monitor the O2 sat and taper O2 as tolerated  * OB planning to deliver soon  The above assessment and plan was discussed with medicine and OB    Jennifer Mayo MD  Pager [phone]  After 5pm and on weekends call [phone]

## 2021-02-03 NOTE — H&P ADULT - ASSESSMENT
40F at 36 weeks gestation COVID+ p/w acute hypoxic respiratory failure. Likely 2/2 COVID pneumonia, but PE also on differential although less likely.

## 2021-02-03 NOTE — H&P ADULT - NSHPREVIEWOFSYSTEMS_GEN_ALL_CORE
REVIEW OF SYSTEMS:    CONSTITUTIONAL: No weakness, fevers or chills  EYES/ENT: No visual changes;  No vertigo or throat pain   NECK: No pain or stiffness  RESPIRATORY: No cough, wheezing, hemoptysis; No shortness of breath  CARDIOVASCULAR: No chest pain or palpitations  GASTROINTESTINAL: No abdominal or epigastric pain. No nausea, vomiting, or hematemesis; No diarrhea or constipation. No melena or hematochezia.  GENITOURINARY: No dysuria, frequency or hematuria  NEUROLOGICAL: No numbness or weakness  SKIN: No itching, rashes REVIEW OF SYSTEMS:  CONSTITUTIONAL: generalized malaise  EYES/ENT: No visual changes;  +sore throat   NECK: No pain or stiffness  RESPIRATORY: see hpi  CARDIOVASCULAR: No chest pain or palpitations  GASTROINTESTINAL: No abdominal or epigastric pain. No nausea, vomiting, or hematemesis; No diarrhea or constipation. No melena or hematochezia.  GENITOURINARY: No dysuria, frequency or hematuria  NEUROLOGICAL: No numbness or weakness  SKIN: No itching, rashes

## 2021-02-03 NOTE — OB PROVIDER DELIVERY SUMMARY - NSPROVIDERDELIVERYNOTE_OBGYN_ALL_OB_FT
Viable male infant. Grossly normal uterus, tubes, ovaries  EBL: 1000   IVF 1350   Viable male infant 4,6,9. 2380g Grossly normal uterus, tubes, ovaries  EBL: 1000   IVF 1350

## 2021-02-03 NOTE — H&P ADULT - NSHPLABSRESULTS_GEN_ALL_CORE
LABS:                          14.4   10.22 )-----------( 283      ( 2021 23:19 )             42.2     02-    130<L>  |  99  |  4<L>  ----------------------------<  83  3.7   |  17<L>  |  0.70    Ca    8.7      2021 23:19    TPro  6.1  /  Alb  3.0<L>  /  TBili  0.6  /  DBili  x   /  AST  36  /  ALT  20  /  AlkPhos  170<H>  02-    LIVER FUNCTIONS - ( 2021 23:19 )  Alb: 3.0 g/dL / Pro: 6.1 g/dL / ALK PHOS: 170 U/L / ALT: 20 U/L / AST: 36 U/L / GGT: x                   Urinalysis Basic - ( 2021 00:13 )    Color: Yellow / Appearance: Slightly Turbid / S.018 / pH: x  Gluc: x / Ketone: Large  / Bili: Negative / Urobili: 3 mg/dL   Blood: x / Protein: 30 mg/dL / Nitrite: Negative   Leuk Esterase: Negative / RBC: 1 /hpf / WBC 10 /HPF   Sq Epi: x / Non Sq Epi: 9 /hpf / Bacteria: Few LABS, imaging reviewed                          14.4   10. )-----------( 283      ( 2021 23:19 )             42.2     02    130<L>  |  99  |  4<L>  ----------------------------<  83  3.7   |  17<L>  |  0.70    Ca    8.7      2021 23:19    TPro  6.1  /  Alb  3.0<L>  /  TBili  0.6  /  DBili  x   /  AST  36  /  ALT  20  /  AlkPhos  170<H>  -    LIVER FUNCTIONS - ( 2021 23:19 )  Alb: 3.0 g/dL / Pro: 6.1 g/dL / ALK PHOS: 170 U/L / ALT: 20 U/L / AST: 36 U/L / GGT: x             Urinalysis Basic - ( 2021 00:13 )    Color: Yellow / Appearance: Slightly Turbid / S.018 / pH: x  Gluc: x / Ketone: Large  / Bili: Negative / Urobili: 3 mg/dL   Blood: x / Protein: 30 mg/dL / Nitrite: Negative   Leuk Esterase: Negative / RBC: 1 /hpf / WBC 10 /HPF   Sq Epi: x / Non Sq Epi: 9 /hpf / Bacteria: Few    CXR: b/l patchy opacities (f/u official report)

## 2021-02-03 NOTE — CONSULT NOTE ADULT - SUBJECTIVE AND OBJECTIVE BOX
HPI:  39 yo  at 36 weeks gestation p/w shortness of breath and cough x 5 days. Pt was tested for COVID on  when his son's teacher was tested positive. She has been having cough and worsening SOB for 5 days, which prompted her to come to the ED today. Has a pulse ox at home and has been 94-95%. Has been feeling "warm" and at home, temperature was 100.4. her cough has been dry/nonproductive, but limits her ability to take full deep breaths. Additionally, patient has runny nose abd body aches. She went to [place] ED and was transferred to [facility] for obstetric eval. Denies any history of miscarriage, no personal or family history of clots. Pregnancy has been uncomplicated, and pt is not on any medications except prenatal vitamin    Primary OB-Dr. Adair    In ED, on 4L satting 97%, vitals otherwise stable. Given Decadron x 1. (2021 03:08)      PAST MEDICAL & SURGICAL HISTORY:  No pertinent past medical history    History of D&amp;C  1999        Allergies    No Known Allergies    Intolerances        ANTIMICROBIALS:      OTHER MEDS:  acetaminophen   Tablet .. 650 milliGRAM(s) Oral every 6 hours PRN  enoxaparin Injectable 40 milliGRAM(s) SubCutaneous two times a day  prenatal multivitamin 1 Tablet(s) Oral daily      SOCIAL HISTORY:  , lives with , son and in laws, former smoker  no  alcohol or drug abuse  no recent travel    FAMILY HISTORY:  son and  had COVID    ROS:     All other systems negative     Constitutional: + fever  Eye: no eye pain, no redness, no vision changes  ENT:  no sore throat, no rhinorrhea  Cardiovascular:  no chest pain, no palpitation  Respiratory:  + SOB, + cough  GI:  no abd pain, no vomiting, no diarrhea  urinary: no dysuria, no hematuria, no flank pain  : no vaginal discharge or bleeding  musculoskeletal:  no joint pain, no joint swelling  skin:  no rash  neurology:  no headache, no seizure, no change in mental status  psych: no anxiety, no depression     Physical Exam:    General:    NAD, non toxic  Head: atraumatic, normocephalic  Eyes: normal sclera and conjunctiva  ENT:   no oropharyngeal lesions, no LAD, neck supple  Cardio:    regular S1,S2  Respiratory:   b/l coarse breath sounds  abd:   soft, BS +, not tender  :     no CVAT, no suprapubic tenderness, no dorman  Musculoskeletal : no joint swelling, no edema  Skin:    no rash  vascular: no phlebitis  Neurologic:     no focal deficits  psych: normal affect      Drug Dosing Weight  Height (cm): 172.7 (2021 22:11)  Weight (kg): 113.4 (2021 22:11)  BMI (kg/m2): 38 (2021 22:11)  BSA (m2): 2.25 (2021 22:11)    Vital Signs Last 24 Hrs  T(F): 97.2 (21 @ 09:55), Max: 100.5 (21 @ 22:11)    Vital Signs Last 24 Hrs  HR: 94 (21 @ 06:50) (86 - 119)  BP: 127/75 (21 @ 06:50) (127/75 - 149/85)  RR: 22 (21 @ 06:50)  SpO2: 95% (21 @ 06:50) (93% - 97%)  Wt(kg): --                          14.1   8.85  )-----------( 247      ( 2021 06:07 )             42.0       02-03    133<L>  |  102  |  5<L>  ----------------------------<  123<H>  4.0   |  16<L>  |  0.66    Ca    8.5      2021 06:07  Phos  3.6     02-03  Mg     2.0     -    TPro  6.7  /  Alb  3.0<L>  /  TBili  0.6  /  DBili  x   /  AST  32  /  ALT  22  /  AlkPhos  165<H>  02-03      Urinalysis Basic - ( 2021 06:07 )    Color: Yellow / Appearance: Slightly Turbid / S.007 / pH: x  Gluc: x / Ketone: Moderate  / Bili: Negative / Urobili: Negative   Blood: x / Protein: Trace / Nitrite: Negative   Leuk Esterase: Negative / RBC: 1 /hpf / WBC 6 /HPF   Sq Epi: x / Non Sq Epi: 5 /hpf / Bacteria: Few        MICROBIOLOGY:  v              RADIOLOGY:    Images independently visualized and reviewed personally,  findings as below    CXR: b/l haziness

## 2021-02-03 NOTE — H&P ADULT - PROBLEM SELECTOR PLAN 2
- at 36 weeks gestation  - NST per OB  - monitor hypoxia  - asymptomatic bacteriuria? on u/a - repeat u/a and urine culture - at 36 weeks gestation  - NST per OB  - monitor hypoxia  - asymptomatic bacteriuria? on u/a - repeat u/a and urine culture  - f/u OB/GYN recs, appreciate input  - 2/3/2021: NST reactive for gestational age, baseline 150 w/ mod variability, +accels, no decels, no ctx. 2/3 official ultrasound w/ BPP.  Contact OB if patient has acutely increased O2 demands.

## 2021-02-03 NOTE — ED ADULT NURSE REASSESSMENT NOTE - NS ED NURSE REASSESS COMMENT FT1
Pt states feeling improvement in shortness of breath, but states feeling warm "I feel like it's from my fever breaking". Patient on CM, made aware still awaiting bed assignment. Will continue to monitor.

## 2021-02-03 NOTE — OB RN DELIVERY SUMMARY - NS_SEPSISRSKCALC_OBGYN_ALL_OB_FT
GBS status in the 'Prenatal Lab tests/results section' on the OB RN Patient Profile must be documented.   EOS calculated successfully. EOS Risk Factor: 0.5/1000 live births ([facility] national incidence); GA=36w2d; Temp=97.9; ROM=0; GBS='Unknown'; Antibiotics='No antibiotics or any antibiotics < 2 hrs prior to birth'

## 2021-02-03 NOTE — ED ADULT NURSE REASSESSMENT NOTE - NS ED NURSE REASSESS COMMENT FT1
patients rectal temp 95.9 rectally. hospitalist Graeme Banks contacted and told RN to "place warm blankets on her and hot packs but do not use the ángel hugger". patient states that "this is the first time shes felt good since I've been here". MD Banks advised RN that its ok for patient to go to her bed on 4 susy. RNs will continue to monitor patients temp 95.9 rectally. hospitalist Graeme Banks contacted and told RN to "place warm blankets on her and hot packs but do not use the ángel hugger". patient states that "this is the first time shes felt good since I've been here". MD Banks advised RN that its ok for patient to go to her bed on 4 susy. RNs will continue to monitor

## 2021-02-03 NOTE — OB PROVIDER DELIVERY SUMMARY - NSCSINDICATIONOTHERA_OBGYN_ALL_OB_FT
Covid positive Covid positive @36+ wks GA with increasing O2 requirements; Hx traumatic vaginal delivery

## 2021-02-03 NOTE — H&P ADULT - PROBLEM SELECTOR PLAN 1
- on 4L satting well on admission  - s/p Decadron 6 mg x 1 2/3/2021 - continue for 10 day course  - likely continue Decadron  - start remdesivir  - continuous pulse ox  - consider PE if not improving - on 4L satting well on admission  - s/p Decadron 6 mg x 1 2/3/2021 - continue for 10 day course  - continue Decadron  - start remdesivir  - continuous pulse ox  - consider PE if not improving  - supportive care as per COVID protocols - on 4L satting well on admission but with tachypnea  - s/p Decadron 6 mg x 1 2/3/2021 - continue for 10 day course  - continue Decadron given tachypnea  - start remdesivir if worsening  - continuous pulse ox  - consider PE if not improving  - supportive care as per COVID protocols - on 4L satting well on admission but with tachypnea and mild hypoxia  - s/p Decadron 6 mg x 1 2/3/2021 - continue for 10 day course  - continue Decadron given tachypnea  - start remdesivir if worsening  - continuous pulse ox  - consider PE if not improving  - supportive care as per COVID protocols

## 2021-02-03 NOTE — CONSULT NOTE ADULT - ASSESSMENT
39yo  @ 36+2 weeks gestation with COVID PNA  Current society guidelines from [facility] for pregnant patients with COVID suggests delivery of patients with severe disease above 32-34 weeks gestation, as benefits of delivery and ability to freely manage maternal illness outweigh risks of  morbidity or mortality, especially given this patient is now above 36 weeks. Risk of progression to severe disease remains high and that would risk injury to fetus.  Due to elevated risk of PE in pregnant patients with COVID, and this patient's desat, tachycardia, and tachypnea, we would also recommend obtaining a stat CTA to r/o PE.  There is no contraindication to receiving dexamethasone nor remdesivir in the pregnant population, and we recommend she receive both of these, and should receive the remdesivir prior to delivery.     The patient has a history of low lying placenta and prior traumatic delivery, given this and the desire to proceed rapidly to delivery, reasonable to proceed with  delivery.    We confirmed the patient has not received enoxaparin since arrival to the hospital on the floor or in the ED, and should be a candidate for neuraxial anesthesia. The final decision will of course lie with anesthesia. We alerted Dr Palleschi from anesthesia about the patient as well.    We have coordinated with labor & delivery, and they are anticipating acceptance of this patient.  The primary OB is also aware and anticipates delivery this afternoon via  delivery.  We anticipate transfer back to the medicine floor after delivery, or ICU if she decompensates due to postpartum cytokine release.     Pt seen and evaluated w KACIE Turcios attending  Findings and recommendations reviewed w pt, RN, covering NP, and her OB team    MD KACIE Carson Fellow

## 2021-02-03 NOTE — OB NEONATOLOGY/PEDIATRICIAN DELIVERY SUMMARY - NSPEDSNEONOTESA_OBGYN_ALL_OB_FT
Baby SARAHY is a 36.2 wk GA MALE born to  COVID + symptomatic mother via C/S. In L&D, patient’s mother had O2Sat as low as 93% and was placed on 4L of NC – subsequently transferred to OR. Maternal history uncomplicated. Prenatal history uncomplicated. Maternal BT O+. PNL neg, NR, and immune. GBS status unknown. No labor, no rupture. Clear fluids at delivery. Baby born with hypotonia, shallow breathing intermittently no breathing, gray, poor response to stimulation, though HR >100. WDSS. At 1 minute of life, patient was bulb suctioned, deep suctioned and pulse oximetry placed – continued to have shallow breathing and poor response to stimulation. O2sat noted to be 30%, placed on CPAP 5/60%. Intermittently apneic until 4 minutes, FiO2 increased to 100% and PPV started (5/20).  Code called - patient began cry, respond to stimulation well, and had improved breaths. NICU fellow and NP arrived, continued to monitor patient on CPAP 5/21-40% - patient continued to have intermittent desaturation to 80’s. Continued to have WOB (restraction, nasal flaring, grunting) – was subsequently placed on NC and admitted to NICU. APGARS: 4 (0 color, 0 respiratory, 1 tone, 1 grimace, 2 HR), 6 (1 respiratory, 1 tone, 2 grimace, 2 HR), 8 (1 color, 2 respiratory, 1 tone, 2 grimace, 2 HR). Mom plans to breast and bottle feed. Would like hepB and circ.

## 2021-02-03 NOTE — H&P ADULT - ATTENDING COMMENTS
I was physically present for the key portions of the evaluation and management (E/M) service provided.  I agree with the above history, physical, and plan which I have reviewed and edited where appropriate.     Plan discussed with Patient, RN    39 yo  at 36 weeks gestation p/w shortness of breath and cough x 5 days.  Patient states her son's teacher was found to have COVID+ and then all her family members were tested.  Her son and  became positive on  and patient became positive on the 2nd test on .  Patient states over the last few days she is having increasing cough and SOB.  She has trouble taking deep breath.  Today she head fever of 100.4.  Denies any nausea, vomiting, diarrhea.  She initially went to [place] and then transferred here for further management.  During interview, patient's RR is 25-30, saturation drops to 92% on RA, improves to 97% on 4L.   Patient otherwise denies cough at the moment.  She is s/p dexamethasone in ED.  Other vitals, labs and imaging reviewed.  I had discussed with patient regarding starting treatment with IV dexamethasone 6mg daily for now.  We will discuss with ID and OB regarding starting remdesivir and patient agrees.  f/u OB recs and NST.  repeat UA.

## 2021-02-03 NOTE — ED ADULT NURSE REASSESSMENT NOTE - NS ED NURSE REASSESS COMMENT FT1
Went in to patient's room to reassess vitals and make patient aware pending new urine specimen collection. Patient drenched in sweat. Sheets and gown changed. Patient states she feels as though fever broke, states feeling improved since arrival. Oral temp 94.5, temp then reassessed rectally 95.9. RN contacted admitting team pager [phone] to have admitting MD call back [phone].

## 2021-02-03 NOTE — CONSULT NOTE ADULT - SUBJECTIVE AND OBJECTIVE BOX
39 yo  at 36+2 weeks gestation p/w shortness of breath and cough x 5 days. COVID+on , asx but tested for exposure. Worsening cough and SOB x5d, feeling warm and had temp 100.4. Home O2 monitoring showed 94-95%.   No obstetric complaints, denies ctx, LOF, VB, DFM.     In ED, desat to high 80s, mostly low 90s, now on 4L satting 97%, vitals otherwise stable. Given Decadron x 1. No remdesivir.     Primary OB-Dr. Ghotra, only complication in [facility] is low lying placenta, last scanned 21 and found to be 0.79cm away from os.  Prior  , per primary OB's review of records had fetal skull fracture from traumatic delivery    PAST MEDICAL & SURGICAL HISTORY:  No pertinent past medical history  History of D&C 1999    MEDICATIONS  (STANDING):  prenatal multivitamin 1 Tablet(s) Oral daily  remdesivir  IVPB   IV Intermittent  not yet started  decadron x1    Vital Signs Last 24 Hrs  T(C): 36.2 (2021 11:25), Max: 38.1 (2021 22:11)  T(F): 97.2 (2021 11:25), Max: 100.5 (2021 22:11)  HR: 105 (2021 11:25) (86 - 119)  BP: 121/77 (2021 11:25) (121/77 - 149/85)  RR: 20 (2021 11:25) (18 - 32)  SpO2: 95% (2021 11:25) (93% - 97%)    PHYSICAL EXAM:  Constitutional:  NAD  Respiratory:  tachypneic, invreased work of breathing  Cardiovascular:  tachycardic  Gastrointestinal:  gravid, NT  Genitourinary:  not performed  Extremities:  NT non edematous    LABORATORY:                      14.1   8.85  )-----------( 247      ( 2021 06:07 )             42.0     02-03  133<L>  |  102  |  5<L>  ----------------------------<  123<H>  4.0   |  16<L>  |  0.66    Ca    8.5      2021 06:07  Phos  3.6     02-03  Mg     2.0     02-03    TPro  6.7  /  Alb  3.0<L>  /  TBili  0.6  /  DBili  x   /  AST  32  /  ALT  22  /  AlkPhos  165<H>  02-    PT/INR - ( 2021 06:07 )   PT: 10.3 sec;   INR: 0.85 ratio      PTT - ( 2021 06:07 )  PTT:23.2 sec  Urinalysis Basic - ( 2021 06:07 )    Color: Yellow / Appearance: Slightly Turbid / S.007 / pH: x  Gluc: x / Ketone: Moderate  / Bili: Negative / Urobili: Negative   Blood: x / Protein: Trace / Nitrite: Negative   Leuk Esterase: Negative / RBC: 1 /hpf / WBC 6 /HPF   Sq Epi: x / Non Sq Epi: 5 /hpf / Bacteria: Few    FHT overnight reactive, 150s, mod, +accels, no decels  BPP this AM

## 2021-02-03 NOTE — PRE-ANESTHESIA EVALUATION ADULT - NSANTHPMHFT_GEN_ALL_CORE
COVID +, symptomatic with fevers, chills, myalgias, cough, tachypnea. says she still feels SOB but less so now than before she came to the hospital.     denies back problems, nerve problems, and issues with excessive bleeding or bruising.

## 2021-02-03 NOTE — H&P ADULT - NSHPPHYSICALEXAM_GEN_ALL_CORE
T(C): 36.4 (03 Feb 2021 02:18), Max: 38.1 (02 Feb 2021 22:11)  T(F): 97.5 (03 Feb 2021 02:18), Max: 100.5 (02 Feb 2021 22:11)  HR: 96 (03 Feb 2021 02:18) (96 - 119)  BP: 135/73 (03 Feb 2021 02:18) (129/62 - 149/85)  BP(mean): 87 (03 Feb 2021 02:18) (87 - 87)  RR: 26 (03 Feb 2021 02:18) (18 - 32)  SpO2: 97% (03 Feb 2021 02:18) (93% - 97%)    PHYSICAL EXAM:  GENERAL: NAD, lying in bed comfortably  HEAD:  Atraumatic, Normocephalic  EYES: EOMI, PERRLA, conjunctiva and sclera clear  ENT: Moist mucous membranes  NECK: Supple, No JVD  CHEST/LUNG: Clear to auscultation bilaterally; No rales, rhonchi, wheezing, or rubs. Unlabored respirations  HEART: Regular rate and rhythm; No murmurs, rubs, or gallops  ABDOMEN: Bowel sounds present; Soft, Nontender, Nondistended. No hepatomegally  EXTREMITIES:  2+ Peripheral Pulses, brisk capillary refill. No clubbing, cyanosis, or edema  NERVOUS SYSTEM:  Alert & Oriented X3, speech clear. No deficits.  MSK: FROM all 4 extremities, full and equal strength  SKIN: No rashes or lesions T(C): 36.4 (03 Feb 2021 02:18), Max: 38.1 (02 Feb 2021 22:11)  T(F): 97.5 (03 Feb 2021 02:18), Max: 100.5 (02 Feb 2021 22:11)  HR: 96 (03 Feb 2021 02:18) (96 - 119)  BP: 135/73 (03 Feb 2021 02:18) (129/62 - 149/85)  BP(mean): 87 (03 Feb 2021 02:18) (87 - 87)  RR: 26 (03 Feb 2021 02:18) (18 - 32)  SpO2: 97% (03 Feb 2021 02:18) (93% - 97%)    PHYSICAL EXAM:  GENERAL: NAD, lying in bed comfortably  HEAD:  Atraumatic, Normocephalic  EYES: EOMI, PERRLA, conjunctiva and sclera clear  ENT: Moist mucous membranes  NECK: Supple, No JVD  CHEST/LUNG: diminishment at bases, on 4LNC with mild tachypnea to 22  HEART: Regular rate and rhythm; No murmurs, rubs, or gallops  ABDOMEN: gravid abdomen, nontender  EXTREMITIES:  2+ Peripheral Pulses, brisk capillary refill. No clubbing, cyanosis, or edema  NERVOUS SYSTEM:  Alert & Oriented X3, speech clear. No deficits.  MSK: FROM all 4 extremities, full and equal strength  SKIN: No rashes or lesions

## 2021-02-03 NOTE — H&P ADULT - PROBLEM SELECTOR PLAN 3
- heparin subQ  - diet regular  - ambulate as tolerated - Lovenox subQ  - diet regular  - ambulate as tolerated

## 2021-02-03 NOTE — OB PROVIDER LABOR PROGRESS NOTE - NS_SUBJECTIVE/OBJECTIVE_OBGYN_ALL_OB_FT
Pt brought down from [address] [place] [address] for delivery at 36+ wks GA with active Covid per MFM. As pt has had a previous traumatic vaginal delivery and has a ? low lying placenta, would proceed with abdominal delivery. Risks/benefits of each discussed and pt wishes to proceed with primary C/S. She has received Remdesivir and Decadromn today.  Will proceed with primary C/S under spinal (if possible) per pt's request.  Continuous pulse ox in RR  SICU consult

## 2021-02-03 NOTE — PROGRESS NOTE ADULT - SUBJECTIVE AND OBJECTIVE BOX
HOSPITALIST NOTE    Dr. Heladio Neely DO  Attending Physician  Division of Hospital Medicine  [facility]  Pager:  [phone]    SUBJECTIVE  Cough.   SHELLEY.  No dyspnea at rest.  Denies pain.     REVIEW OF SYSTEMS  12 point review of systems negative except for above.     PAST MEDICAL & SURGICAL HISTORY:  No pertinent past medical history    History of D&amp;C  1999        MEDICATIONS  (STANDING):  enoxaparin Injectable 40 milliGRAM(s) SubCutaneous two times a day  prenatal multivitamin 1 Tablet(s) Oral daily  remdesivir  IVPB   IV Intermittent     MEDICATIONS  (PRN):  acetaminophen   Tablet .. 650 milliGRAM(s) Oral every 6 hours PRN Temp greater or equal to 38C (100.4F)      Allergies    No Known Allergies    Intolerances        T(C): 36.2 (21 @ 11:25), Max: 38.1 (21 @ 22:11)  T(F): 97.2 (21 @ 11:25), Max: 100.5 (21 @ 22:11)  HR: 105 (21 @ 11:25) (86 - 119)  BP: 121/77 (21 @ 11:25) (121/77 - 149/85)  ABP: --  ABP(mean): --  RR: 20 (21 @ 11:25) (18 - 32)  SpO2: 95% (21 @ 11:25) (93% - 97%)      CONSTITUTIONAL: No acute distress.   HEENT:  Conjunctiva clear B/L.  Moist oral mucosa.   Cardiovascular: RRR with no murmurs. No JVD noted. No lower extremity edema B/L. Extremities are warm and well perfused. Radial pulses 2+ B/L. Dorsalis pedis pulses 2+ B/L.    Respiratory: Dec bs at bases. No wrr. No accessory muscle use.   Gastrointestinal:  Soft, nontender. Non-distended. Non-rigid. No CVA tenderness B/L. Gravid uterus noted.   MSK:  No joint swelling. No joint erythema B/L. No midline spinal tenderness.  Neurologic:  Alert and awake. Oriented x3. Moving all extremities. Following commands. Making eye contact.    Skin:  No rashes noted. No skin erythema noted.   Psych:  Normal affect. Normal Mood.     LABS                        14.1   8.85  )-----------( 247      ( 2021 06:07 )             42.0     02-03    133<L>  |  102  |  5<L>  ----------------------------<  123<H>  4.0   |  16<L>  |  0.66    Ca    8.5      2021 06:07  Phos  3.6     02-03  Mg     2.0     02-03    TPro  6.7  /  Alb  3.0<L>  /  TBili  0.6  /  DBili  x   /  AST  32  /  ALT  22  /  AlkPhos  165<H>  02-03    PT/INR - ( 2021 06:07 )   PT: 10.3 sec;   INR: 0.85 ratio         PTT - ( 2021 06:07 )  PTT:23.2 sec  Urinalysis Basic - ( 2021 06:07 )    Color: Yellow / Appearance: Slightly Turbid / S.007 / pH: x  Gluc: x / Ketone: Moderate  / Bili: Negative / Urobili: Negative   Blood: x / Protein: Trace / Nitrite: Negative   Leuk Esterase: Negative / RBC: 1 /hpf / WBC 6 /HPF   Sq Epi: x / Non Sq Epi: 5 /hpf / Bacteria: Few        ADDITIONAL STUDIES PERSONALLY REVIEWED    Our team discussed the case with all consults    All consultant contribution to care greatly appreciated.

## 2021-02-04 LAB
ALBUMIN SERPL ELPH-MCNC: 2.4 G/DL — LOW (ref 3.3–5)
ALP SERPL-CCNC: 137 U/L — HIGH (ref 40–120)
ALT FLD-CCNC: 20 U/L — SIGNIFICANT CHANGE UP (ref 10–45)
ANION GAP SERPL CALC-SCNC: 10 MMOL/L — SIGNIFICANT CHANGE UP (ref 5–17)
AST SERPL-CCNC: 35 U/L — SIGNIFICANT CHANGE UP (ref 10–40)
BILIRUB DIRECT SERPL-MCNC: 0.2 MG/DL — SIGNIFICANT CHANGE UP (ref 0–0.2)
BILIRUB INDIRECT FLD-MCNC: 0.2 MG/DL — SIGNIFICANT CHANGE UP (ref 0.2–1)
BILIRUB SERPL-MCNC: 0.4 MG/DL — SIGNIFICANT CHANGE UP (ref 0.2–1.2)
BUN SERPL-MCNC: 9 MG/DL — SIGNIFICANT CHANGE UP (ref 7–23)
CALCIUM SERPL-MCNC: 8.9 MG/DL — SIGNIFICANT CHANGE UP (ref 8.4–10.5)
CHLORIDE SERPL-SCNC: 105 MMOL/L — SIGNIFICANT CHANGE UP (ref 96–108)
CO2 SERPL-SCNC: 19 MMOL/L — LOW (ref 22–31)
CREAT SERPL-MCNC: 0.57 MG/DL — SIGNIFICANT CHANGE UP (ref 0.5–1.3)
CULTURE RESULTS: SIGNIFICANT CHANGE UP
GLUCOSE SERPL-MCNC: 143 MG/DL — HIGH (ref 70–99)
HCT VFR BLD CALC: 34.8 % — SIGNIFICANT CHANGE UP (ref 34.5–45)
HGB BLD-MCNC: 11.7 G/DL — SIGNIFICANT CHANGE UP (ref 11.5–15.5)
MCHC RBC-ENTMCNC: 28.7 PG — SIGNIFICANT CHANGE UP (ref 27–34)
MCHC RBC-ENTMCNC: 33.6 GM/DL — SIGNIFICANT CHANGE UP (ref 32–36)
MCV RBC AUTO: 85.5 FL — SIGNIFICANT CHANGE UP (ref 80–100)
NRBC # BLD: 0 /100 WBCS — SIGNIFICANT CHANGE UP (ref 0–0)
PLATELET # BLD AUTO: 272 K/UL — SIGNIFICANT CHANGE UP (ref 150–400)
POTASSIUM SERPL-MCNC: 4.7 MMOL/L — SIGNIFICANT CHANGE UP (ref 3.5–5.3)
POTASSIUM SERPL-SCNC: 4.7 MMOL/L — SIGNIFICANT CHANGE UP (ref 3.5–5.3)
PROT SERPL-MCNC: 5.2 G/DL — LOW (ref 6–8.3)
RBC # BLD: 4.07 M/UL — SIGNIFICANT CHANGE UP (ref 3.8–5.2)
RBC # FLD: 12.9 % — SIGNIFICANT CHANGE UP (ref 10.3–14.5)
SARS-COV-2 IGG SERPL QL IA: POSITIVE
SARS-COV-2 IGM SERPL IA-ACNC: 1.78 RATIO — HIGH
SODIUM SERPL-SCNC: 134 MMOL/L — LOW (ref 135–145)
SPECIMEN SOURCE: SIGNIFICANT CHANGE UP
WBC # BLD: 11.55 K/UL — HIGH (ref 3.8–10.5)
WBC # FLD AUTO: 11.55 K/UL — HIGH (ref 3.8–10.5)

## 2021-02-04 PROCEDURE — 99231 SBSQ HOSP IP/OBS SF/LOW 25: CPT

## 2021-02-04 PROCEDURE — 99232 SBSQ HOSP IP/OBS MODERATE 35: CPT

## 2021-02-04 PROCEDURE — 99233 SBSQ HOSP IP/OBS HIGH 50: CPT

## 2021-02-04 RX ORDER — HEPARIN SODIUM 5000 [USP'U]/ML
5000 INJECTION INTRAVENOUS; SUBCUTANEOUS EVERY 12 HOURS
Refills: 0 | Status: DISCONTINUED | OUTPATIENT
Start: 2021-02-04 | End: 2021-02-05

## 2021-02-04 RX ORDER — SENNA PLUS 8.6 MG/1
2 TABLET ORAL AT BEDTIME
Refills: 0 | Status: DISCONTINUED | OUTPATIENT
Start: 2021-02-04 | End: 2021-02-07

## 2021-02-04 RX ORDER — POLYETHYLENE GLYCOL 3350 17 G/17G
17 POWDER, FOR SOLUTION ORAL DAILY
Refills: 0 | Status: DISCONTINUED | OUTPATIENT
Start: 2021-02-04 | End: 2021-02-07

## 2021-02-04 RX ADMIN — Medication 30 MILLIGRAM(S): at 00:47

## 2021-02-04 RX ADMIN — HEPARIN SODIUM 5000 UNIT(S): 5000 INJECTION INTRAVENOUS; SUBCUTANEOUS at 05:13

## 2021-02-04 RX ADMIN — Medication 1 TABLET(S): at 11:12

## 2021-02-04 RX ADMIN — REMDESIVIR 500 MILLIGRAM(S): 5 INJECTION INTRAVENOUS at 11:12

## 2021-02-04 RX ADMIN — Medication 30 MILLIGRAM(S): at 05:13

## 2021-02-04 RX ADMIN — SIMETHICONE 80 MILLIGRAM(S): 80 TABLET, CHEWABLE ORAL at 21:47

## 2021-02-04 RX ADMIN — OXYCODONE HYDROCHLORIDE 5 MILLIGRAM(S): 5 TABLET ORAL at 16:07

## 2021-02-04 RX ADMIN — Medication 6 MILLIGRAM(S): at 00:47

## 2021-02-04 RX ADMIN — SENNA PLUS 2 TABLET(S): 8.6 TABLET ORAL at 21:35

## 2021-02-04 RX ADMIN — Medication 6 MILLIGRAM(S): at 23:14

## 2021-02-04 RX ADMIN — HEPARIN SODIUM 5000 UNIT(S): 5000 INJECTION INTRAVENOUS; SUBCUTANEOUS at 18:33

## 2021-02-04 RX ADMIN — SODIUM CHLORIDE 125 MILLILITER(S): 9 INJECTION, SOLUTION INTRAVENOUS at 00:49

## 2021-02-04 RX ADMIN — Medication 975 MILLIGRAM(S): at 02:00

## 2021-02-04 RX ADMIN — OXYCODONE HYDROCHLORIDE 10 MILLIGRAM(S): 5 TABLET ORAL at 21:46

## 2021-02-04 NOTE — PROGRESS NOTE ADULT - SUBJECTIVE AND OBJECTIVE BOX
HOSPITALIST NOTE    Dr. Heladio Neely DO  Attending Physician  Division of Hospital Medicine  [facility]  Pager:  [phone]    SUBJECTIVE  Pain well controlled at rest.  Some pain with movement in postop surgical area.  Cough improved.     REVIEW OF SYSTEMS  12 point review of systems negative except for above.     PAST MEDICAL & SURGICAL HISTORY:  No pertinent past medical history    History of D&amp;C  1999        MEDICATIONS  (STANDING):  dexAMETHasone  Injectable 6 milliGRAM(s) IV Push every 24 hours  heparin   Injectable 5000 Unit(s) SubCutaneous every 12 hours  prenatal multivitamin 1 Tablet(s) Oral daily  remdesivir  IVPB   IV Intermittent   remdesivir  IVPB 100 milliGRAM(s) IV Intermittent every 24 hours    MEDICATIONS  (PRN):  acetaminophen   Tablet .. 650 milliGRAM(s) Oral every 6 hours PRN Temp greater or equal to 38C (100.4F)  lanolin Ointment 1 Application(s) Topical every 6 hours PRN Sore Nipples  magnesium hydroxide Suspension 30 milliLiter(s) Oral two times a day PRN Constipation  naloxone Injectable 0.1 milliGRAM(s) IV Push every 3 minutes PRN For ANY of the following changes in patient status:  A. Breaths Per Minute LESS THAN 10, B. Oxygen saturation LESS THAN 90%, C. Sedation score of 6 for Stop After: 4 Times  oxyCODONE    IR 5 milliGRAM(s) Oral every 3 hours PRN Mild Pain (1 - 3)  oxyCODONE    IR 10 milliGRAM(s) Oral every 3 hours PRN Moderate Pain (4 - 6)  simethicone 80 milliGRAM(s) Chew every 4 hours PRN Gas      Allergies    No Known Allergies    Intolerances        T(C): 36.3 (21 @ 11:02), Max: 36.6 (21 @ 14:35)  T(F): 97.4 (21 @ 11:02), Max: 97.9 (21 @ 15:03)  HR: 80 (21 @ 11:02) (76 - 111)  BP: 125/77 (21 @ 11:02) (101/71 - 134/59)  ABP: --  ABP(mean): --  RR: 17 (21 @ 11:02) (17 - 24)  SpO2: 96% (21 @ 11:02) (81% - 100%)      CONSTITUTIONAL: No acute distress.   HEENT:  Conjunctiva clear B/L.  Moist oral mucosa.   Cardiovascular: RRR with no murmurs. No JVD noted. No lower extremity edema B/L. Extremities are warm and well perfused. Radial pulses 2+ B/L. Dorsalis pedis pulses 2+ B/L.    Respiratory: Dec bs at bases. No wrr. No accessory muscle use.   Gastrointestinal:  Soft, nontender. Non-distended. Non-rigid. No CVA tenderness B/L.   MSK:  No joint swelling. No joint erythema B/L. No midline spinal tenderness.  Neurologic:  Alert and awake. Oriented x3. Moving all extremities. Following commands. Making eye contact.    Skin:   dressing noted -- deferred undressing.   Psych:  Normal affect. Normal Mood.     LABS                        11.7   11.55 )-----------( 272      ( 2021 05:52 )             34.8     02-    134<L>  |  105  |  9   ----------------------------<  143<H>  4.7   |  19<L>  |  0.57    Ca    8.9      2021 05:52  Phos  3.6     02-03  Mg     2.0     02-03    TPro  5.2<L>  /  Alb  2.4<L>  /  TBili  0.4  /  DBili  0.2  /  AST  35  /  ALT  20  /  AlkPhos  137<H>  -    PT/INR - ( 2021 06:07 )   PT: 10.3 sec;   INR: 0.85 ratio         PTT - ( 2021 06:07 )  PTT:23.2 sec  Urinalysis Basic - ( 2021 06:07 )    Color: Yellow / Appearance: Slightly Turbid / S.007 / pH: x  Gluc: x / Ketone: Moderate  / Bili: Negative / Urobili: Negative   Blood: x / Protein: Trace / Nitrite: Negative   Leuk Esterase: Negative / RBC: 1 /hpf / WBC 6 /HPF   Sq Epi: x / Non Sq Epi: 5 /hpf / Bacteria: Few        ADDITIONAL STUDIES PERSONALLY REVIEWED    Our team discussed the case with all consults    All consultant contribution to care greatly appreciated.

## 2021-02-04 NOTE — PROGRESS NOTE ADULT - ASSESSMENT
41y/o  POD1 s/p pLTCS due to increasing oxygen demands in the setting of COVID PNA.  Pt continues on 4L NC. though overall feeling well. Explained pain control regimen, motrin and oxycodone ok to take for pain. Rec. d/c dorman catheter. Discussed importance of ambuilation for lung function, bowel function, and recovery from surgery. Pt stable meeting postoperative milestones.  Though not yet a candidate, we would recommend she receive 2 weeks prophylactic anticoagulation (lovenox 40mg SC once daily) on discharge due to the multiple risks of COVID, post-pregnancy, post-operative state, and immobility during recovery.    FILIBERTOM will cont to follow    MD KACIE Carson Fellow

## 2021-02-04 NOTE — PROGRESS NOTE ADULT - SUBJECTIVE AND OBJECTIVE BOX
OB Progress Note:  Delivery, POD#1    Pt seen and evaluated at bedside.  Her pain is well controlled. She is tolerating a regular diet. Denies N/V. Denies CP/SOB/lightheadedness/dizziness. Son in place.     O:   Vital Signs Last 24 Hrs  T(C): 36.3 (2021 23:45), Max: 36.6 (2021 14:35)  T(F): 97.4 (2021 23:45), Max: 97.9 (2021 15:03)  HR: 86 (2021 23:45) (83 - 111)  BP: 128/76 (2021 23:45) (109/71 - 134/59)  BP(mean): --  RR: 18 (2021 23:45) (18 - 24)  SpO2: 95% (2021 23:45) (81% - 100%)    Labs:  Blood type: O Positive  Rubella IgG: RPR:                           14.1   8.85 >-----------< 247    (  @ 06:07 )             42.0                        14.4   10.22 >-----------< 283    (  @ 23:19 )             42.2    21 @ 06:07      133<L>  |  102  |  5<L>  ----------------------------<  123<H>  4.0   |  16<L>  |  0.66    21 @ 23:19      130<L>  |  99  |  4<L>  ----------------------------<  83  3.7   |  17<L>  |  0.70        Ca    8.5      2021 06:07  Ca    8.7      2021 23:19  Phos  3.6       Mg     2.0         TPro  6.7  /  Alb  3.0<L>  /  TBili  0.6  /  DBili  x   /  AST  32  /  ALT  22  /  AlkPhos  165<H>  21 @ 06:07  TPro  6.1  /  Alb  3.0<L>  /  TBili  0.6  /  DBili  x   /  AST  36  /  ALT  20  /  AlkPhos  170<H>  21 @ 23:19          PE:  General: NAD  Abdomen: Mildly distended, appropriately tender, incision c/d/i.  Extremities: No erythema, no pitting edema

## 2021-02-04 NOTE — PROGRESS NOTE ADULT - SUBJECTIVE AND OBJECTIVE BOX
POD#1 s/p CS for symptomatic COVID    Seen and evalauted at bedside w Dr Mason  Her pain is well controlled. She is tolerating a regular diet. Denies N/V. Denies CP/SOB/lightheadedness/dizziness. Son in place. has not yet ambulated or passed flatus    Vital Signs Last 24 Hrs  T(C): 36.3 (04 Feb 2021 11:02), Max: 36.6 (03 Feb 2021 14:35)  T(F): 97.4 (04 Feb 2021 11:02), Max: 97.9 (03 Feb 2021 15:03)  HR: 80 (04 Feb 2021 11:02) (76 - 111)  BP: 125/77 (04 Feb 2021 11:02) (101/71 - 134/59)  BP(mean): --  RR: 17 (04 Feb 2021 11:02) (17 - 24)  SpO2: 96% (04 Feb 2021 11:02) (81% - 100%)    LABS:                        11.7   11.55 )-----------( 272      ( 04 Feb 2021 05:52 )             34.8     04 Feb 2021 05:52    134    |  105    |  9      ----------------------------<  143    4.7     |  19     |  0.57     Ca    8.9        04 Feb 2021 05:52    TPro  5.2    /  Alb  2.4    /  TBili  0.4    /  DBili  0.2    /  AST  35     /  ALT  20     /  AlkPhos  137    04 Feb 2021 05:52    PT/INR - ( 03 Feb 2021 06:07 )   PT: 10.3 sec;   INR: 0.85 ratio         PTT - ( 03 Feb 2021 06:07 )  PTT:23.2 sec    PE:  General: NAD  Abdomen: appropriately tender, incision c/d/i.  Extremities: No erythema, no pitting edema

## 2021-02-04 NOTE — PROVIDER CONTACT NOTE (CHANGE IN STATUS NOTIFICATION) - BACKGROUND
C/S day 1 with +covid and symptomatic admitted to [address] [place] [address] for covid monitoring. Patient s/p OBRR. As per L&D RN Elder, pad changed at 2245 and transferred to [address] [place] [address] at ~2340 with MD Montemayor.

## 2021-02-04 NOTE — PROGRESS NOTE ADULT - SUBJECTIVE AND OBJECTIVE BOX
Day 1 of Anesthesia Pain Management Service    SUBJECTIVE: Doing ok  Pain Scale Score:          [X] Refer to charted pain scores    THERAPY:    s/p     100mcg PF morphine on 2\4\2021      MEDICATIONS  (STANDING):  dexAMETHasone  Injectable 6 milliGRAM(s) IV Push every 24 hours  diphtheria/tetanus/pertussis (acellular) Vaccine (ADAcel) 0.5 milliLiter(s) IntraMuscular once  heparin   Injectable 5000 Unit(s) SubCutaneous every 12 hours  oxytocin Infusion 333.333 milliUNIT(s)/Min (1000 mL/Hr) IV Continuous <Continuous>  oxytocin Infusion 333.333 milliUNIT(s)/Min (1000 mL/Hr) IV Continuous <Continuous>  prenatal multivitamin 1 Tablet(s) Oral daily  remdesivir  IVPB   IV Intermittent   remdesivir  IVPB 100 milliGRAM(s) IV Intermittent every 24 hours    MEDICATIONS  (PRN):  acetaminophen   Tablet .. 650 milliGRAM(s) Oral every 6 hours PRN Temp greater or equal to 38C (100.4F)  lanolin Ointment 1 Application(s) Topical every 6 hours PRN Sore Nipples  magnesium hydroxide Suspension 30 milliLiter(s) Oral two times a day PRN Constipation  naloxone Injectable 0.1 milliGRAM(s) IV Push every 3 minutes PRN For ANY of the following changes in patient status:  A. Breaths Per Minute LESS THAN 10, B. Oxygen saturation LESS THAN 90%, C. Sedation score of 6 for Stop After: 4 Times  oxyCODONE    IR 5 milliGRAM(s) Oral every 3 hours PRN Mild Pain (1 - 3)  oxyCODONE    IR 10 milliGRAM(s) Oral every 3 hours PRN Moderate Pain (4 - 6)  simethicone 80 milliGRAM(s) Chew every 4 hours PRN Gas      OBJECTIVE:    Sedation:        	[X] Alert	 [ ] Drowsy	[ ] Arousable      [ ] Asleep       [ ] Unresponsive    Side Effects:	[X] None 	[ ] Nausea	[ ] Vomiting         [ ] Pruritus  		[ ] Weakness            [ ] Numbness	          [ ] Other:    Vital Signs Last 24 Hrs  T(C): 36.4 (04 Feb 2021 05:13), Max: 36.6 (03 Feb 2021 14:35)  T(F): 97.5 (04 Feb 2021 05:13), Max: 97.9 (03 Feb 2021 15:03)  HR: 76 (04 Feb 2021 05:13) (76 - 111)  BP: 101/71 (04 Feb 2021 05:13) (101/71 - 134/59)  BP(mean): --  RR: 18 (04 Feb 2021 05:13) (18 - 24)  SpO2: 95% (04 Feb 2021 05:13) (81% - 100%)    ASSESSMENT/ PLAN  [X] Patient to be transitioned to prn analgesics after 24 hours  [X] Pain management per primary service, pain service to sign off   [X]Documentation and Verification of current medications

## 2021-02-04 NOTE — PROGRESS NOTE ADULT - SUBJECTIVE AND OBJECTIVE BOX
Follow Up:  COVID while pregnant    Interval History: pt delivered yesterday, now feels slightly better    ROS:      All other systems negative    Constitutional: no fever, no chills  Cardiovascular:  no chest pain, no palpitation  Respiratory:  improved SOB, cough  GI:  no abd pain, no vomiting, no diarrhea  urinary: no dysuria, no hematuria, no flank pain  musculoskeletal:  no joint pain, no joint swelling  skin:  no rash  neurology:  no headache, no seizure      Allergies  No Known Allergies        ANTIMICROBIALS:  remdesivir  IVPB    remdesivir  IVPB 100 every 24 hours      OTHER MEDS:  acetaminophen   Tablet .. 650 milliGRAM(s) Oral every 6 hours PRN  dexAMETHasone  Injectable 6 milliGRAM(s) IV Push every 24 hours  heparin   Injectable 5000 Unit(s) SubCutaneous every 12 hours  lanolin Ointment 1 Application(s) Topical every 6 hours PRN  magnesium hydroxide Suspension 30 milliLiter(s) Oral two times a day PRN  naloxone Injectable 0.1 milliGRAM(s) IV Push every 3 minutes PRN  oxyCODONE    IR 5 milliGRAM(s) Oral every 3 hours PRN  oxyCODONE    IR 10 milliGRAM(s) Oral every 3 hours PRN  prenatal multivitamin 1 Tablet(s) Oral daily  simethicone 80 milliGRAM(s) Chew every 4 hours PRN      Vital Signs Last 24 Hrs  T(C): 36.3 (2021 11:02), Max: 36.6 (2021 14:35)  T(F): 97.4 (2021 11:02), Max: 97.9 (2021 15:03)  HR: 80 (2021 11:02) (76 - 111)  BP: 125/77 (2021 11:02) (101/71 - 134/59)  BP(mean): --  RR: 17 (2021 11:02) (17 - 24)  SpO2: 96% (2021 11:02) (81% - 100%)    Physical Exam:  General:    NAD, non toxic  Cardio:    regular S1,S2  Respiratory:   b/l coarse breath sounds  abd:   soft, BS +, not tender  :     no CVAT, no suprapubic tenderness, no dorman  Musculoskeletal : no joint swelling, no edema  Skin:    no rash  vascular: no phlebitis               11.7   11.55 )-----------( 272      ( 2021 05:52 )             34.8       02-    134<L>  |  105  |  9   ----------------------------<  143<H>  4.7   |  19<L>  |  0.57    Ca    8.9      2021 05:52  Phos  3.6       Mg     2.0         TPro  5.2<L>  /  Alb  2.4<L>  /  TBili  0.4  /  DBili  0.2  /  AST  35  /  ALT  20  /  AlkPhos  137<H>        Urinalysis Basic - ( 2021 06:07 )    Color: Yellow / Appearance: Slightly Turbid / S.007 / pH: x  Gluc: x / Ketone: Moderate  / Bili: Negative / Urobili: Negative   Blood: x / Protein: Trace / Nitrite: Negative   Leuk Esterase: Negative / RBC: 1 /hpf / WBC 6 /HPF   Sq Epi: x / Non Sq Epi: 5 /hpf / Bacteria: Few        MICROBIOLOGY:  v  .Urine Clean Catch (Midstream)  21   <10,000 CFU/mL Normal Urogenital Elizabeth  --  --                RADIOLOGY:  Images independently visualized and reviewed personally, findings as below  < from: CT Angio Chest w/ IV Cont (21 @ 14:00) >  IMPRESSION:    1.  No pulmonary embolus.  2.  The bilateral opacities can occur in the clinical setting of lung injury/atypical pneumonia/viral infection.      < end of copied text >

## 2021-02-04 NOTE — PROGRESS NOTE ADULT - ASSESSMENT
39y/o  POD1 s/p pLTCS due to increasing oxygen demands in the setting of COVID. Pt continues on 4L NC. Pt stable meeting postoperative milestones.  -Continue Tylenol, motrin, oxy PRN for pain management   -D/c dandy this morning  -Increase ambulation  -Continue regular diet   -Appreciate medicine care    colleen [email or web address]

## 2021-02-04 NOTE — PROVIDER CONTACT NOTE (CHANGE IN STATUS NOTIFICATION) - ASSESSMENT
Upon assessment at 0045 on 4 susy, 2 pads on blue chux saturated with small clots present.  Another blue chux that was under patient was saturated. As per MD Montemayor, bleeding WNL at time of transfer.

## 2021-02-04 NOTE — PROGRESS NOTE ADULT - ASSESSMENT
40 f 36 pregnant, tested positive for COVID 1/23 when all family had COVID but she was asymptomatic now p/w worsening dyspnea and cough for about 10 days  here febrile to 100.5, tachypneic, requiring 4 liters O2  WBC normal, D-Dimer: 566, CRP: 5.8  CXR: b/l haziness  Ob delivered the baby yesterday 2/3    fever, hypoxic respiratory distress due to COVID pneumonia in a 36 w pregnant pt    * c/w remdesivir for a 5 day course, started 2/3, now day 2  * c/w dexa 6 qd for up to a 10 day course  * monitor the renal function and LFTs  * monitor the O2 sat and taper O2 as tolerated    The above assessment and plan was discussed with the primary team    Jennifer Mayo MD  Pager [phone]  After 5pm and on weekends call [phone]

## 2021-02-05 LAB
ALBUMIN SERPL ELPH-MCNC: 2.4 G/DL — LOW (ref 3.3–5)
ALP SERPL-CCNC: 126 U/L — HIGH (ref 40–120)
ALT FLD-CCNC: 19 U/L — SIGNIFICANT CHANGE UP (ref 10–45)
ANION GAP SERPL CALC-SCNC: 9 MMOL/L — SIGNIFICANT CHANGE UP (ref 5–17)
AST SERPL-CCNC: 27 U/L — SIGNIFICANT CHANGE UP (ref 10–40)
BILIRUB DIRECT SERPL-MCNC: <0.1 MG/DL — SIGNIFICANT CHANGE UP (ref 0–0.2)
BILIRUB INDIRECT FLD-MCNC: >0.1 MG/DL — LOW (ref 0.2–1)
BILIRUB SERPL-MCNC: 0.2 MG/DL — SIGNIFICANT CHANGE UP (ref 0.2–1.2)
BUN SERPL-MCNC: 13 MG/DL — SIGNIFICANT CHANGE UP (ref 7–23)
CALCIUM SERPL-MCNC: 9.1 MG/DL — SIGNIFICANT CHANGE UP (ref 8.4–10.5)
CHLORIDE SERPL-SCNC: 104 MMOL/L — SIGNIFICANT CHANGE UP (ref 96–108)
CO2 SERPL-SCNC: 23 MMOL/L — SIGNIFICANT CHANGE UP (ref 22–31)
CREAT SERPL-MCNC: 0.7 MG/DL — SIGNIFICANT CHANGE UP (ref 0.5–1.3)
CRP SERPL-MCNC: 2.34 MG/DL — HIGH (ref 0–0.4)
D DIMER BLD IA.RAPID-MCNC: 282 NG/ML DDU — HIGH
FERRITIN SERPL-MCNC: 105 NG/ML — SIGNIFICANT CHANGE UP (ref 15–150)
GLUCOSE SERPL-MCNC: 96 MG/DL — SIGNIFICANT CHANGE UP (ref 70–99)
HCT VFR BLD CALC: 33.1 % — LOW (ref 34.5–45)
HGB BLD-MCNC: 11.1 G/DL — LOW (ref 11.5–15.5)
LDH SERPL L TO P-CCNC: 215 U/L — SIGNIFICANT CHANGE UP (ref 50–242)
MCHC RBC-ENTMCNC: 28.8 PG — SIGNIFICANT CHANGE UP (ref 27–34)
MCHC RBC-ENTMCNC: 33.5 GM/DL — SIGNIFICANT CHANGE UP (ref 32–36)
MCV RBC AUTO: 86 FL — SIGNIFICANT CHANGE UP (ref 80–100)
NRBC # BLD: 0 /100 WBCS — SIGNIFICANT CHANGE UP (ref 0–0)
PLATELET # BLD AUTO: 372 K/UL — SIGNIFICANT CHANGE UP (ref 150–400)
POTASSIUM SERPL-MCNC: 4.2 MMOL/L — SIGNIFICANT CHANGE UP (ref 3.5–5.3)
POTASSIUM SERPL-SCNC: 4.2 MMOL/L — SIGNIFICANT CHANGE UP (ref 3.5–5.3)
PROT SERPL-MCNC: 5.3 G/DL — LOW (ref 6–8.3)
RBC # BLD: 3.85 M/UL — SIGNIFICANT CHANGE UP (ref 3.8–5.2)
RBC # FLD: 13.1 % — SIGNIFICANT CHANGE UP (ref 10.3–14.5)
SODIUM SERPL-SCNC: 136 MMOL/L — SIGNIFICANT CHANGE UP (ref 135–145)
WBC # BLD: 13.26 K/UL — HIGH (ref 3.8–10.5)
WBC # FLD AUTO: 13.26 K/UL — HIGH (ref 3.8–10.5)

## 2021-02-05 PROCEDURE — 99231 SBSQ HOSP IP/OBS SF/LOW 25: CPT

## 2021-02-05 PROCEDURE — 99232 SBSQ HOSP IP/OBS MODERATE 35: CPT | Mod: GC

## 2021-02-05 PROCEDURE — 99232 SBSQ HOSP IP/OBS MODERATE 35: CPT

## 2021-02-05 RX ORDER — ACETAMINOPHEN 500 MG
650 TABLET ORAL EVERY 6 HOURS
Refills: 0 | Status: DISCONTINUED | OUTPATIENT
Start: 2021-02-05 | End: 2021-02-05

## 2021-02-05 RX ORDER — IBUPROFEN 200 MG
600 TABLET ORAL EVERY 6 HOURS
Refills: 0 | Status: DISCONTINUED | OUTPATIENT
Start: 2021-02-05 | End: 2021-02-07

## 2021-02-05 RX ORDER — ACETAMINOPHEN 500 MG
975 TABLET ORAL EVERY 6 HOURS
Refills: 0 | Status: DISCONTINUED | OUTPATIENT
Start: 2021-02-05 | End: 2021-02-07

## 2021-02-05 RX ORDER — MORPHINE SULFATE 50 MG/1
2 CAPSULE, EXTENDED RELEASE ORAL ONCE
Refills: 0 | Status: DISCONTINUED | OUTPATIENT
Start: 2021-02-05 | End: 2021-02-05

## 2021-02-05 RX ORDER — ENOXAPARIN SODIUM 100 MG/ML
40 INJECTION SUBCUTANEOUS EVERY 12 HOURS
Refills: 0 | Status: DISCONTINUED | OUTPATIENT
Start: 2021-02-05 | End: 2021-02-05

## 2021-02-05 RX ORDER — OXYCODONE HYDROCHLORIDE 5 MG/1
5 TABLET ORAL EVERY 6 HOURS
Refills: 0 | Status: DISCONTINUED | OUTPATIENT
Start: 2021-02-05 | End: 2021-02-07

## 2021-02-05 RX ORDER — ENOXAPARIN SODIUM 100 MG/ML
40 INJECTION SUBCUTANEOUS EVERY 24 HOURS
Refills: 0 | Status: DISCONTINUED | OUTPATIENT
Start: 2021-02-05 | End: 2021-02-07

## 2021-02-05 RX ADMIN — REMDESIVIR 500 MILLIGRAM(S): 5 INJECTION INTRAVENOUS at 12:30

## 2021-02-05 RX ADMIN — ENOXAPARIN SODIUM 40 MILLIGRAM(S): 100 INJECTION SUBCUTANEOUS at 17:45

## 2021-02-05 RX ADMIN — Medication 1 TABLET(S): at 12:30

## 2021-02-05 RX ADMIN — OXYCODONE HYDROCHLORIDE 5 MILLIGRAM(S): 5 TABLET ORAL at 10:19

## 2021-02-05 RX ADMIN — Medication 975 MILLIGRAM(S): at 21:05

## 2021-02-05 RX ADMIN — POLYETHYLENE GLYCOL 3350 17 GRAM(S): 17 POWDER, FOR SOLUTION ORAL at 12:31

## 2021-02-05 RX ADMIN — Medication 600 MILLIGRAM(S): at 17:45

## 2021-02-05 RX ADMIN — HEPARIN SODIUM 5000 UNIT(S): 5000 INJECTION INTRAVENOUS; SUBCUTANEOUS at 05:20

## 2021-02-05 RX ADMIN — Medication 6 MILLIGRAM(S): at 23:48

## 2021-02-05 RX ADMIN — Medication 600 MILLIGRAM(S): at 23:47

## 2021-02-05 RX ADMIN — Medication 650 MILLIGRAM(S): at 14:13

## 2021-02-05 RX ADMIN — MORPHINE SULFATE 2 MILLIGRAM(S): 50 CAPSULE, EXTENDED RELEASE ORAL at 06:29

## 2021-02-05 RX ADMIN — SENNA PLUS 2 TABLET(S): 8.6 TABLET ORAL at 21:05

## 2021-02-05 NOTE — PROGRESS NOTE ADULT - ASSESSMENT
41y/o  POD2 s/p pLTCS due to increasing oxygen demands in the setting of COVID. Pt continues on 4L NC. Pt stable meeting postoperative milestones.  -Continue Tylenol, motrin, oxy PRN for pain management   -Increase ambulation  -Continue regular diet   -Appreciate medicine care    colleen [email or web address]

## 2021-02-05 NOTE — PROGRESS NOTE ADULT - SUBJECTIVE AND OBJECTIVE BOX
LTCS, POD#2    Pt seen and evaluated at bedside.  Pain is well controlled. She is tolerating a regular diet and passing flatus, no BM. She is voiding spontaneously, and ambulating without difficulty. Denies CP/SOB. Denies lightheadedness/dizziness. Denies N/V.   Significantly omproved work of breathing, now off O2 sitting in chair    Vital Signs Last 24 Hrs  T(C): 36.7 (05 Feb 2021 10:16), Max: 36.7 (05 Feb 2021 10:16)  T(F): 98.1 (05 Feb 2021 10:16), Max: 98.1 (05 Feb 2021 10:16)  HR: 92 (05 Feb 2021 10:16) (79 - 92)  BP: 141/80 (05 Feb 2021 10:16) (118/80 - 141/80)  BP(mean): --  RR: 16 (05 Feb 2021 14:02) (16 - 18)  SpO2: 92% (05 Feb 2021 14:02) (92% - 97%)    Labs:  Blood type: O Positive  Rubella IgG: RPR:     LABS:                        11.1   13.26 )-----------( 372      ( 05 Feb 2021 06:12 )             33.1     05 Feb 2021 06:12    136    |  104    |  13     ----------------------------<  96     4.2     |  23     |  0.70     Ca    9.1        05 Feb 2021 06:12    TPro  5.3    /  Alb  2.4    /  TBili  0.2    /  DBili  <0.1   /  AST  27     /  ALT  19     /  AlkPhos  126    05 Feb 2021 06:12                          11.7   11.55<H> >-----------< 272    ( 02-04 @ 05:52 )             34.8                        14.1   8.85 >-----------< 247    ( 02-03 @ 06:07 )             42.0                        14.4   10.22 >-----------< 283    ( 02-02 @ 23:19 )             42.2    02-04-21 @ 05:52      134<L>  |  105  |  9   ----------------------------<  143<H>  4.7   |  19<L>  |  0.57    02-03-21 @ 06:07      133<L>  |  102  |  5<L>  ----------------------------<  123<H>  4.0   |  16<L>  |  0.66    02-02-21 @ 23:19      130<L>  |  99  |  4<L>  ----------------------------<  83  3.7   |  17<L>  |  0.70        Ca    8.9      04 Feb 2021 05:52  Ca    8.5      03 Feb 2021 06:07  Ca    8.7      02 Feb 2021 23:19  Phos  3.6     02-03  Mg     2.0     02-03    TPro  5.2<L>  /  Alb  2.4<L>  /  TBili  0.4  /  DBili  0.2  /  AST  35  /  ALT  20  /  AlkPhos  137<H>  02-04-21 @ 05:52  TPro  6.7  /  Alb  3.0<L>  /  TBili  0.6  /  DBili  x   /  AST  32  /  ALT  22  /  AlkPhos  165<H>  02-03-21 @ 06:07  TPro  6.1  /  Alb  3.0<L>  /  TBili  0.6  /  DBili  x   /  AST  36  /  ALT  20  /  AlkPhos  170<H>  02-02-21 @ 23:19          PE:  General: NAD  Pulm: no resp distress  Abdomen: Soft, appropriately tender, incision c/d/i, bandage removed  Extremities: No erythema, no pitting edema

## 2021-02-05 NOTE — PROGRESS NOTE ADULT - SUBJECTIVE AND OBJECTIVE BOX
Follow Up:  COVID while pregnant    Interval History: pt was on RA in the morning, improved SOB    ROS:      All other systems negative    Constitutional: no fever, no chills  Cardiovascular:  no chest pain, no palpitation  Respiratory:  improved SOB, cough  GI:  no abd pain, no vomiting, no diarrhea  urinary: no dysuria, no hematuria, no flank pain  musculoskeletal:  no joint pain, no joint swelling  skin:  no rash  neurology:  no headache, no seizure      Allergies  No Known Allergies        ANTIMICROBIALS:  remdesivir  IVPB    remdesivir  IVPB 100 every 24 hours      OTHER MEDS:  acetaminophen   Tablet .. 650 milliGRAM(s) Oral every 6 hours PRN  dexAMETHasone  Injectable 6 milliGRAM(s) IV Push every 24 hours  enoxaparin Injectable 40 milliGRAM(s) SubCutaneous every 12 hours  lanolin Ointment 1 Application(s) Topical every 6 hours PRN  naloxone Injectable 0.1 milliGRAM(s) IV Push every 3 minutes PRN  oxyCODONE    IR 5 milliGRAM(s) Oral every 6 hours PRN  polyethylene glycol 3350 17 Gram(s) Oral daily  prenatal multivitamin 1 Tablet(s) Oral daily  senna 2 Tablet(s) Oral at bedtime      Vital Signs Last 24 Hrs  T(C): 36.4 (05 Feb 2021 04:37), Max: 36.4 (04 Feb 2021 20:55)  T(F): 97.5 (05 Feb 2021 04:37), Max: 97.5 (04 Feb 2021 20:55)  HR: 79 (05 Feb 2021 04:37) (79 - 90)  BP: 118/80 (05 Feb 2021 04:37) (118/80 - 128/86)  BP(mean): --  RR: 16 (05 Feb 2021 10:26) (16 - 17)  SpO2: 96% (05 Feb 2021 10:26) (96% - 97%)    Physical Exam:  General:    NAD, non toxics, sitting on a chair  Cardio:    regular S1,S2  Respiratory: comfortable on RA,  b/l coarse breath sounds  abd:   soft, BS +, not tender  :     no CVAT, no suprapubic tenderness, no dorman  Musculoskeletal : no joint swelling, no edema  Skin:    no rash  vascular: no phlebitis                          11.1   13.26 )-----------( 372      ( 05 Feb 2021 06:12 )             33.1       02-05    136  |  104  |  13  ----------------------------<  96  4.2   |  23  |  0.70    Ca    9.1      05 Feb 2021 06:12    TPro  5.3<L>  /  Alb  2.4<L>  /  TBili  0.2  /  DBili  <0.1  /  AST  27  /  ALT  19  /  AlkPhos  126<H>  02-05          MICROBIOLOGY:  v  .Urine Clean Catch (Midstream)  02-03-21   <10,000 CFU/mL Normal Urogenital Elizabeth  --  --                RADIOLOGY:  Images independently visualized and reviewed personally, findings as below  < from: CT Angio Chest w/ IV Cont (02.03.21 @ 14:00) >  IMPRESSION:    1.  No pulmonary embolus.  2.  The bilateral opacities can occur in the clinical setting of lung injury/atypical pneumonia/viral infection.

## 2021-02-05 NOTE — PROGRESS NOTE ADULT - SUBJECTIVE AND OBJECTIVE BOX
OB Progress Note: LTCS, POD#2    Pt seen and evaluated at bedside.  Pain is well controlled. She is tolerating a regular diet and passing flatus. She is voiding spontaneously, and ambulating without difficulty. Denies CP/SOB. Denies lightheadedness/dizziness. Denies N/V.    O:  Vitals:  Vital Signs Last 24 Hrs  T(C): 36.4 (05 Feb 2021 04:37), Max: 36.4 (04 Feb 2021 05:13)  T(F): 97.5 (05 Feb 2021 04:37), Max: 97.5 (04 Feb 2021 05:13)  HR: 79 (05 Feb 2021 04:37) (76 - 90)  BP: 118/80 (05 Feb 2021 04:37) (101/71 - 128/86)  BP(mean): --  RR: 17 (05 Feb 2021 04:37) (16 - 18)  SpO2: 96% (05 Feb 2021 04:37) (95% - 97%)    MEDICATIONS  (STANDING):  dexAMETHasone  Injectable 6 milliGRAM(s) IV Push every 24 hours  heparin   Injectable 5000 Unit(s) SubCutaneous every 12 hours  polyethylene glycol 3350 17 Gram(s) Oral daily  prenatal multivitamin 1 Tablet(s) Oral daily  remdesivir  IVPB   IV Intermittent   remdesivir  IVPB 100 milliGRAM(s) IV Intermittent every 24 hours  senna 2 Tablet(s) Oral at bedtime      MEDICATIONS  (PRN):  acetaminophen   Tablet .. 650 milliGRAM(s) Oral every 6 hours PRN Temp greater or equal to 38C (100.4F)  lanolin Ointment 1 Application(s) Topical every 6 hours PRN Sore Nipples  magnesium hydroxide Suspension 30 milliLiter(s) Oral two times a day PRN Constipation  naloxone Injectable 0.1 milliGRAM(s) IV Push every 3 minutes PRN For ANY of the following changes in patient status:  A. Breaths Per Minute LESS THAN 10, B. Oxygen saturation LESS THAN 90%, C. Sedation score of 6 for Stop After: 4 Times  simethicone 80 milliGRAM(s) Chew every 4 hours PRN Gas      Labs:  Blood type: O Positive  Rubella IgG: RPR:                           11.7   11.55<H> >-----------< 272    ( 02-04 @ 05:52 )             34.8                        14.1   8.85 >-----------< 247    ( 02-03 @ 06:07 )             42.0                        14.4   10.22 >-----------< 283    ( 02-02 @ 23:19 )             42.2    02-04-21 @ 05:52      134<L>  |  105  |  9   ----------------------------<  143<H>  4.7   |  19<L>  |  0.57    02-03-21 @ 06:07      133<L>  |  102  |  5<L>  ----------------------------<  123<H>  4.0   |  16<L>  |  0.66    02-02-21 @ 23:19      130<L>  |  99  |  4<L>  ----------------------------<  83  3.7   |  17<L>  |  0.70        Ca    8.9      04 Feb 2021 05:52  Ca    8.5      03 Feb 2021 06:07  Ca    8.7      02 Feb 2021 23:19  Phos  3.6     02-03  Mg     2.0     02-03    TPro  5.2<L>  /  Alb  2.4<L>  /  TBili  0.4  /  DBili  0.2  /  AST  35  /  ALT  20  /  AlkPhos  137<H>  02-04-21 @ 05:52  TPro  6.7  /  Alb  3.0<L>  /  TBili  0.6  /  DBili  x   /  AST  32  /  ALT  22  /  AlkPhos  165<H>  02-03-21 @ 06:07  TPro  6.1  /  Alb  3.0<L>  /  TBili  0.6  /  DBili  x   /  AST  36  /  ALT  20  /  AlkPhos  170<H>  02-02-21 @ 23:19          PE:  General: NAD  Abdomen: Soft, appropriately tender, incision c/d/i.  Extremities: No erythema, no pitting edema

## 2021-02-05 NOTE — PROGRESS NOTE ADULT - PROBLEM SELECTOR PLAN 2
-  at 36 weeks gestation  - Ucx negative.
-  at 36 weeks gestation  - Ucx negative.
-  planned as above.  - at 36 weeks gestation  - NST per OB  - Contact OB if patient has acutely increased O2 demands.  - Ucx pending. If positive, would treat asymptomatic bacteruria.

## 2021-02-05 NOTE — PROGRESS NOTE ADULT - PROBLEM SELECTOR PLAN 1
- Shared decision making between our team, MFM and ID. Resume Decadron and Remdesivir.   - Continue decadron for 10 day course and remdesivir for 5 day course. F/u with ID w/ regards to duration -- to finish remdesivir inpatient prior to d/c?   - Pain control post surgery.   - Incentive spirometer and lateral recumbent positioning if possible.   - CTA PE protocol done -- no PE.   - S/p  on 2/3.   - Lower suspicion for bacterial component. Leukocytosis reactive from surgery. Monitor off abx for now.   - OB cleared for lovenox as 2 days out from epidural injection.   - Reasonable to pursue extended VTE prophylaxis upon d/c as patient is of elevated risk.   - All consultant contribution to care greatly appreciated.  - O2 stable on RA. Would ambulate on RA and document o2 sat.   - Patient to be transferred to OB under Dr. Cervantes. Thank you for allowing us to participate in the care of Ms. Dowling. Please consult medicine if additional assistance required.
- Shared decision making between MFM and ID. Resume Decadron and start Remdesivir.   - supportive care as per COVID protocols  - Incentive spirometer and lateral recumbent positioning if possible.   - Care discussed with MFM team regarding CTA PE protocol prior to urgent  in setting of pregnancy. Discussed d-dimer in setting of pregnancy, lack of RV strain on EKG, and lack of LE edema. MFM team would like to pursue imaging prior to OR. Will pursue per their preference.   - C/w supp o2.  - Lower suspicion for bacterial component. Monitor off abx for now.   - All consultant contribution to care greatly appreciated.
- Shared decision making between MFM and ID. Resume Decadron and Remdesivir.   - supportive care as per COVID protocols  - Incentive spirometer and lateral recumbent positioning if possible.   - CTA PE protocol prior to urgent  in setting of pregnancy. Discussed d-dimer in setting of pregnancy, lack of RV strain on EKG, and lack of LE edema. MFM team would like to pursue imaging prior to OR. Done. No VTE detected.   - S/p  on 2/3.  - C/w supp o2. Wean as tolerated.   - Lower suspicion for bacterial component. Monitor off abx for now.   - OB recommending extended vte ppx.  - HSQ ordered by ob team. Will inquire when we can switch to lovenox. S/p epidural.   - All consultant contribution to care greatly appreciated.

## 2021-02-05 NOTE — PROGRESS NOTE ADULT - PROBLEM SELECTOR PLAN 3
- dvt ppx as above.  - diet regular
- dvt ppx as above.  - diet regular
- Lovenox subQ  - diet regular

## 2021-02-05 NOTE — PROGRESS NOTE ADULT - SUBJECTIVE AND OBJECTIVE BOX
HOSPITALIST NOTE    Dr. Heladio Neely DO  Attending Physician  Division of Hospital Medicine  [facility]  Pager:  [phone]    SUBJECTIVE  Reports pain at the incision site 6/10 but improved with pain meds.  Dyspnea improved.     REVIEW OF SYSTEMS  12 point review of systems negative except for above.     PAST MEDICAL & SURGICAL HISTORY:  No pertinent past medical history    History of D&amp;C  1999        MEDICATIONS  (STANDING):  dexAMETHasone  Injectable 6 milliGRAM(s) IV Push every 24 hours  enoxaparin Injectable 40 milliGRAM(s) SubCutaneous every 12 hours  polyethylene glycol 3350 17 Gram(s) Oral daily  prenatal multivitamin 1 Tablet(s) Oral daily  remdesivir  IVPB   IV Intermittent   remdesivir  IVPB 100 milliGRAM(s) IV Intermittent every 24 hours  senna 2 Tablet(s) Oral at bedtime    MEDICATIONS  (PRN):  acetaminophen   Tablet .. 650 milliGRAM(s) Oral every 6 hours PRN Temp greater or equal to 38C (100.4F), Mild Pain (1 - 3)  lanolin Ointment 1 Application(s) Topical every 6 hours PRN Sore Nipples  naloxone Injectable 0.1 milliGRAM(s) IV Push every 3 minutes PRN For ANY of the following changes in patient status:  A. Breaths Per Minute LESS THAN 10, B. Oxygen saturation LESS THAN 90%, C. Sedation score of 6 for Stop After: 4 Times  oxyCODONE    IR 5 milliGRAM(s) Oral every 6 hours PRN Moderate Pain (4 - 6)      Allergies    No Known Allergies    Intolerances        T(C): 36.4 (21 @ 04:37), Max: 36.4 (21 @ 20:55)  T(F): 97.5 (21 @ 04:37), Max: 97.5 (21 @ 20:55)  HR: 79 (21 @ 04:37) (79 - 90)  BP: 118/80 (21 @ 04:37) (118/80 - 128/86)  ABP: --  ABP(mean): --  RR: 16 (21 @ 10:26) (16 - 17)  SpO2: 96% (21 @ 10:26) (96% - 97%)      CONSTITUTIONAL: No acute distress.   HEENT:  Conjunctiva clear B/L.  Moist oral mucosa.   Cardiovascular: RRR with no murmurs. No JVD noted. No lower extremity edema B/L. Extremities are warm and well perfused. Radial pulses 2+ B/L. Dorsalis pedis pulses 2+ B/L.    Respiratory: Dec bs at bases. No wrr. No accessory muscle use.   Gastrointestinal:  Soft, nontender. Non-distended. Non-rigid. No CVA tenderness B/L.   MSK:  No joint swelling. No joint erythema B/L. No midline spinal tenderness.  Neurologic:  Alert and awake. Oriented x3. Moving all extremities. Following commands. Making eye contact.    Skin:   dressing noted -- deferred undressing.   Psych:  Normal affect. Normal Mood.       LABS                        11.1   13.26 )-----------( 372      ( 2021 06:12 )             33.1     02-    136  |  104  |  13  ----------------------------<  96  4.2   |  23  |  0.70    Ca    9.1      2021 06:12    TPro  5.3<L>  /  Alb  2.4<L>  /  TBili  0.2  /  DBili  <0.1  /  AST  27  /  ALT  19  /  AlkPhos  126<H>  02-          ADDITIONAL STUDIES PERSONALLY REVIEWED    Our team discussed the case with all consults    All consultant contribution to care greatly appreciated.

## 2021-02-05 NOTE — PROGRESS NOTE ADULT - PROBLEM SELECTOR PROBLEM 1
Acute hypoxemic respiratory failure due to COVID-19

## 2021-02-05 NOTE — PROGRESS NOTE ADULT - ASSESSMENT
40 f 36 pregnant, tested positive for COVID  when all family had COVID but she was asymptomatic now p/w worsening dyspnea and cough for about 10 days  here febrile to 100.5, tachypneic, requiring 4 liters O2  WBC normal, D-Dimer: 566, CRP: 5.8  CXR: b/l haziness  Ob did a  2/3    fever, hypoxic respiratory distress due to COVID pneumonia in a 36 w pregnant pt  * pt clinically much better and no RA  * c/w remdesivir for a 5 day course, started 2/3, now day 3  * c/w dexa 6 qd for up to a 10 day course  * monitor the renal function and LFTs  * monitor the O2 sat and taper O2 as tolerated    The above assessment and plan was discussed with the primary team    Jennifer Mayo MD  Pager [phone]  After 5pm and on weekends call [phone]

## 2021-02-06 LAB
ALBUMIN SERPL ELPH-MCNC: 2.6 G/DL — LOW (ref 3.3–5)
ALP SERPL-CCNC: 149 U/L — HIGH (ref 40–120)
ALT FLD-CCNC: 31 U/L — SIGNIFICANT CHANGE UP (ref 10–45)
AST SERPL-CCNC: 47 U/L — HIGH (ref 10–40)
BILIRUB DIRECT SERPL-MCNC: <0.1 MG/DL — SIGNIFICANT CHANGE UP (ref 0–0.2)
BILIRUB INDIRECT FLD-MCNC: >0.1 MG/DL — LOW (ref 0.2–1)
BILIRUB SERPL-MCNC: 0.2 MG/DL — SIGNIFICANT CHANGE UP (ref 0.2–1.2)
CREAT SERPL-MCNC: 0.67 MG/DL — SIGNIFICANT CHANGE UP (ref 0.5–1.3)
PROT SERPL-MCNC: 5.8 G/DL — LOW (ref 6–8.3)

## 2021-02-06 RX ADMIN — POLYETHYLENE GLYCOL 3350 17 GRAM(S): 17 POWDER, FOR SOLUTION ORAL at 12:15

## 2021-02-06 RX ADMIN — Medication 600 MILLIGRAM(S): at 23:51

## 2021-02-06 RX ADMIN — ENOXAPARIN SODIUM 40 MILLIGRAM(S): 100 INJECTION SUBCUTANEOUS at 18:24

## 2021-02-06 RX ADMIN — Medication 1 TABLET(S): at 12:15

## 2021-02-06 RX ADMIN — REMDESIVIR 500 MILLIGRAM(S): 5 INJECTION INTRAVENOUS at 12:17

## 2021-02-06 RX ADMIN — Medication 975 MILLIGRAM(S): at 20:41

## 2021-02-06 RX ADMIN — Medication 600 MILLIGRAM(S): at 12:15

## 2021-02-06 RX ADMIN — Medication 975 MILLIGRAM(S): at 09:03

## 2021-02-06 RX ADMIN — SENNA PLUS 2 TABLET(S): 8.6 TABLET ORAL at 21:23

## 2021-02-06 RX ADMIN — Medication 600 MILLIGRAM(S): at 05:42

## 2021-02-06 RX ADMIN — Medication 6 MILLIGRAM(S): at 23:46

## 2021-02-06 RX ADMIN — Medication 975 MILLIGRAM(S): at 15:09

## 2021-02-06 RX ADMIN — Medication 975 MILLIGRAM(S): at 02:29

## 2021-02-06 RX ADMIN — Medication 600 MILLIGRAM(S): at 18:23

## 2021-02-06 NOTE — PROGRESS NOTE ADULT - SUBJECTIVE AND OBJECTIVE BOX
OB Postpartum Note:  Delivery, POD#3    Pt seen and evaluated at bedside. The patient feels well.  Pain is well controlled. Continues to have cough but denies shortness of breath or difficulty breathing. She is tolerating a regular diet and passing flatus. She is voiding spontaneously, and ambulating without difficulty. Denies lightheadedness/dizziness. Denies N/V.    O:  Vitals:  Vital Signs Last 24 Hrs  T(C): 36.6 (2021 00:25), Max: 36.7 (2021 10:16)  T(F): 97.9 (2021 00:25), Max: 98.1 (2021 10:16)  HR: 76 (2021 00:25) (76 - 92)  BP: 118/79 (2021 00:25) (118/79 - 141/80)  BP(mean): --  RR: 16 (2021 00:25) (16 - 18)  SpO2: 95% (2021 00:25) (92% - 97%)    MEDICATIONS  (STANDING):  dexAMETHasone  Injectable 6 milliGRAM(s) IV Push every 24 hours  enoxaparin Injectable 40 milliGRAM(s) SubCutaneous every 24 hours  polyethylene glycol 3350 17 Gram(s) Oral daily  prenatal multivitamin 1 Tablet(s) Oral daily  remdesivir  IVPB   IV Intermittent   remdesivir  IVPB 100 milliGRAM(s) IV Intermittent every 24 hours  senna 2 Tablet(s) Oral at bedtime    MEDICATIONS  (PRN):  acetaminophen   Tablet .. 975 milliGRAM(s) Oral every 6 hours PRN Mild Pain (1 - 3)  ibuprofen  Tablet. 600 milliGRAM(s) Oral every 6 hours PRN Mild Pain (1 - 3)  lanolin Ointment 1 Application(s) Topical every 6 hours PRN Sore Nipples  naloxone Injectable 0.1 milliGRAM(s) IV Push every 3 minutes PRN For ANY of the following changes in patient status:  A. Breaths Per Minute LESS THAN 10, B. Oxygen saturation LESS THAN 90%, C. Sedation score of 6 for Stop After: 4 Times  oxyCODONE    IR 5 milliGRAM(s) Oral every 6 hours PRN Moderate Pain (4 - 6)      LABS:  Blood type: O Positive  Rubella IgG: RPR:                           11.1<L>   13.26<H> >-----------< 372    (  @ 06:12 )             33.1<L>                        11.7   11.55<H> >-----------< 272    (  @ 05:52 )             34.8                        14.1   8.85 >-----------< 247    (  @ 06:07 )             42.0    21 @ 06:12      136  |  104  |  13  ----------------------------<  96  4.2   |  23  |  0.70    21 @ 05:52      134<L>  |  105  |  9   ----------------------------<  143<H>  4.7   |  19<L>  |  0.57    21 @ 06:07      133<L>  |  102  |  5<L>  ----------------------------<  123<H>  4.0   |  16<L>  |  0.66        Ca    9.1      2021 06:12  Ca    8.9      2021 05:52  Ca    8.5      2021 06:07  Phos  3.6     -  Mg     2.0     -    TPro  5.3<L>  /  Alb  2.4<L>  /  TBili  0.2  /  DBili  <0.1  /  AST  27  /  ALT  19  /  AlkPhos  126<H>  21 @ 06:12  TPro  5.2<L>  /  Alb  2.4<L>  /  TBili  0.4  /  DBili  0.2  /  AST  35  /  ALT  20  /  AlkPhos  137<H>  21 @ 05:52  TPro  6.7  /  Alb  3.0<L>  /  TBili  0.6  /  DBili  x   /  AST  32  /  ALT  22  /  AlkPhos  165<H>  21 @ 06:07          Physical exam:  Gen: NAD  Abdomen: Soft, nontender, no distension , firm uterine fundus at umbilicus.  Incision: Clean, dry, and intact   Ext: No calf tenderness    A/P: 41yo POD#3 s/p LTCS.  Patient is stable and is doing well post-operatively.  - Continue motrin, tylenol, oxycodone PRN for pain control.  - Increase ambulation  - Continue regular diet  - Discharge planning      colleen [email or web address]

## 2021-02-06 NOTE — PROGRESS NOTE ADULT - ASSESSMENT
41y/o  POD3 s/p pLTCS due to increasing oxygen demands in the setting of COVID. Pt now satting well on room air. Pt stable meeting postoperative milestones.  -Continue Tylenol, motrin, oxy PRN for pain management   -Increase ambulation  -Continue regular diet   -Discharge planning    colleen pgy3 41y/o  POD3 s/p pLTCS due to increasing oxygen demands in the setting of COVID. Pt now saturating well on room air. Pt stable meeting postoperative milestones.  -Continue Tylenol, motrin, oxy PRN for pain management   -Increase ambulation  -Continue regular diet   -Discharge planning    colleen [email or web address]

## 2021-02-07 ENCOUNTER — TRANSCRIPTION ENCOUNTER (OUTPATIENT)
Age: 41
End: 2021-02-07

## 2021-02-07 VITALS
DIASTOLIC BLOOD PRESSURE: 76 MMHG | RESPIRATION RATE: 18 BRPM | HEART RATE: 66 BPM | TEMPERATURE: 98 F | OXYGEN SATURATION: 99 % | SYSTOLIC BLOOD PRESSURE: 116 MMHG

## 2021-02-07 LAB
ALBUMIN SERPL ELPH-MCNC: 2.7 G/DL — LOW (ref 3.3–5)
ALP SERPL-CCNC: 125 U/L — HIGH (ref 40–120)
ALT FLD-CCNC: 85 U/L — HIGH (ref 10–45)
AST SERPL-CCNC: 118 U/L — HIGH (ref 10–40)
BILIRUB DIRECT SERPL-MCNC: <0.1 MG/DL — SIGNIFICANT CHANGE UP (ref 0–0.2)
BILIRUB INDIRECT FLD-MCNC: >0.1 MG/DL — LOW (ref 0.2–1)
BILIRUB SERPL-MCNC: 0.2 MG/DL — SIGNIFICANT CHANGE UP (ref 0.2–1.2)
CREAT SERPL-MCNC: 0.72 MG/DL — SIGNIFICANT CHANGE UP (ref 0.5–1.3)
PROT SERPL-MCNC: 5.7 G/DL — LOW (ref 6–8.3)

## 2021-02-07 PROCEDURE — 85014 HEMATOCRIT: CPT

## 2021-02-07 PROCEDURE — 86769 SARS-COV-2 COVID-19 ANTIBODY: CPT

## 2021-02-07 PROCEDURE — 96374 THER/PROPH/DIAG INJ IV PUSH: CPT

## 2021-02-07 PROCEDURE — 99232 SBSQ HOSP IP/OBS MODERATE 35: CPT

## 2021-02-07 PROCEDURE — 71045 X-RAY EXAM CHEST 1 VIEW: CPT

## 2021-02-07 PROCEDURE — 84295 ASSAY OF SERUM SODIUM: CPT

## 2021-02-07 PROCEDURE — 84100 ASSAY OF PHOSPHORUS: CPT

## 2021-02-07 PROCEDURE — 59050 FETAL MONITOR W/REPORT: CPT

## 2021-02-07 PROCEDURE — 86140 C-REACTIVE PROTEIN: CPT

## 2021-02-07 PROCEDURE — 83735 ASSAY OF MAGNESIUM: CPT

## 2021-02-07 PROCEDURE — 82803 BLOOD GASES ANY COMBINATION: CPT

## 2021-02-07 PROCEDURE — 88307 TISSUE EXAM BY PATHOLOGIST: CPT

## 2021-02-07 PROCEDURE — 85018 HEMOGLOBIN: CPT

## 2021-02-07 PROCEDURE — 84132 ASSAY OF SERUM POTASSIUM: CPT

## 2021-02-07 PROCEDURE — 86901 BLOOD TYPING SEROLOGIC RH(D): CPT

## 2021-02-07 PROCEDURE — 83615 LACTATE (LD) (LDH) ENZYME: CPT

## 2021-02-07 PROCEDURE — 80048 BASIC METABOLIC PNL TOTAL CA: CPT

## 2021-02-07 PROCEDURE — 87086 URINE CULTURE/COLONY COUNT: CPT

## 2021-02-07 PROCEDURE — 85730 THROMBOPLASTIN TIME PARTIAL: CPT

## 2021-02-07 PROCEDURE — U0005: CPT

## 2021-02-07 PROCEDURE — 71275 CT ANGIOGRAPHY CHEST: CPT

## 2021-02-07 PROCEDURE — 85027 COMPLETE CBC AUTOMATED: CPT

## 2021-02-07 PROCEDURE — 82435 ASSAY OF BLOOD CHLORIDE: CPT

## 2021-02-07 PROCEDURE — 86900 BLOOD TYPING SEROLOGIC ABO: CPT

## 2021-02-07 PROCEDURE — 82947 ASSAY GLUCOSE BLOOD QUANT: CPT

## 2021-02-07 PROCEDURE — 82330 ASSAY OF CALCIUM: CPT

## 2021-02-07 PROCEDURE — 85379 FIBRIN DEGRADATION QUANT: CPT

## 2021-02-07 PROCEDURE — 85025 COMPLETE CBC W/AUTO DIFF WBC: CPT

## 2021-02-07 PROCEDURE — 81001 URINALYSIS AUTO W/SCOPE: CPT

## 2021-02-07 PROCEDURE — 85610 PROTHROMBIN TIME: CPT

## 2021-02-07 PROCEDURE — 93005 ELECTROCARDIOGRAM TRACING: CPT

## 2021-02-07 PROCEDURE — 84145 PROCALCITONIN (PCT): CPT

## 2021-02-07 PROCEDURE — 87635 SARS-COV-2 COVID-19 AMP PRB: CPT

## 2021-02-07 PROCEDURE — 82728 ASSAY OF FERRITIN: CPT

## 2021-02-07 PROCEDURE — 80053 COMPREHEN METABOLIC PANEL: CPT

## 2021-02-07 PROCEDURE — 86850 RBC ANTIBODY SCREEN: CPT

## 2021-02-07 PROCEDURE — 99285 EMERGENCY DEPT VISIT HI MDM: CPT | Mod: 25

## 2021-02-07 PROCEDURE — 59025 FETAL NON-STRESS TEST: CPT

## 2021-02-07 PROCEDURE — 80076 HEPATIC FUNCTION PANEL: CPT

## 2021-02-07 PROCEDURE — 82565 ASSAY OF CREATININE: CPT

## 2021-02-07 PROCEDURE — 83605 ASSAY OF LACTIC ACID: CPT

## 2021-02-07 RX ORDER — OXYCODONE HYDROCHLORIDE 5 MG/1
1 TABLET ORAL
Qty: 5 | Refills: 0
Start: 2021-02-07

## 2021-02-07 RX ORDER — IBUPROFEN 200 MG
1 TABLET ORAL
Qty: 0 | Refills: 0 | DISCHARGE
Start: 2021-02-07

## 2021-02-07 RX ORDER — ENOXAPARIN SODIUM 100 MG/ML
40 INJECTION SUBCUTANEOUS
Qty: 560 | Refills: 0
Start: 2021-02-07 | End: 2021-02-20

## 2021-02-07 RX ORDER — ACETAMINOPHEN 500 MG
3 TABLET ORAL
Qty: 0 | Refills: 0 | DISCHARGE
Start: 2021-02-07

## 2021-02-07 RX ADMIN — Medication 1 TABLET(S): at 11:58

## 2021-02-07 RX ADMIN — Medication 600 MILLIGRAM(S): at 11:58

## 2021-02-07 RX ADMIN — Medication 600 MILLIGRAM(S): at 23:58

## 2021-02-07 RX ADMIN — ENOXAPARIN SODIUM 40 MILLIGRAM(S): 100 INJECTION SUBCUTANEOUS at 16:21

## 2021-02-07 RX ADMIN — Medication 975 MILLIGRAM(S): at 02:36

## 2021-02-07 RX ADMIN — POLYETHYLENE GLYCOL 3350 17 GRAM(S): 17 POWDER, FOR SOLUTION ORAL at 11:58

## 2021-02-07 RX ADMIN — SENNA PLUS 2 TABLET(S): 8.6 TABLET ORAL at 23:58

## 2021-02-07 RX ADMIN — Medication 975 MILLIGRAM(S): at 08:27

## 2021-02-07 RX ADMIN — REMDESIVIR 500 MILLIGRAM(S): 5 INJECTION INTRAVENOUS at 11:57

## 2021-02-07 RX ADMIN — Medication 600 MILLIGRAM(S): at 18:48

## 2021-02-07 RX ADMIN — Medication 975 MILLIGRAM(S): at 16:19

## 2021-02-07 RX ADMIN — Medication 600 MILLIGRAM(S): at 06:35

## 2021-02-07 RX ADMIN — Medication 975 MILLIGRAM(S): at 21:00

## 2021-02-07 NOTE — PROGRESS NOTE ADULT - ASSESSMENT
40 f 36 pregnant, tested positive for COVID  when all family had COVID but she was asymptomatic now p/w worsening dyspnea and cough for about 10 days  here febrile to 100.5, tachypneic, requiring 4 liters O2  WBC normal, D-Dimer: 566, CRP: 5.8  CXR: b/l haziness  Ob did a  2/3    fever, hypoxic respiratory distress due to COVID pneumonia in a 36 w pregnant pt  * pt clinically much better and no RA  * today day 5 of remdesivir, will complete the course today  * s/p 5 days of dexa and pt is significantly better, has been on RA, can discontinue on discharge    The above assessment and plan was discussed with OB    Jennifer Mayo MD  Pager [phone]  After 5pm and on weekends call [phone]

## 2021-02-07 NOTE — PROGRESS NOTE ADULT - SUBJECTIVE AND OBJECTIVE BOX
Follow Up:  COVID while pregnant    Interval History: pt has been well on RA, no more SOB    ROS:      All other systems negative    Constitutional: no fever, no chills  Cardiovascular:  no chest pain, no palpitation  Respiratory:  improved SOB, cough  GI:  no abd pain, no vomiting, no diarrhea  urinary: no dysuria, no hematuria, no flank pain  musculoskeletal:  no joint pain, no joint swelling  skin:  no rash  neurology:  no headache, no seizure        Allergies  No Known Allergies        ANTIMICROBIALS:  remdesivir  IVPB    remdesivir  IVPB 100 every 24 hours      OTHER MEDS:  acetaminophen   Tablet .. 975 milliGRAM(s) Oral every 6 hours PRN  dexAMETHasone  Injectable 6 milliGRAM(s) IV Push every 24 hours  enoxaparin Injectable 40 milliGRAM(s) SubCutaneous every 24 hours  ibuprofen  Tablet. 600 milliGRAM(s) Oral every 6 hours PRN  lanolin Ointment 1 Application(s) Topical every 6 hours PRN  naloxone Injectable 0.1 milliGRAM(s) IV Push every 3 minutes PRN  oxyCODONE    IR 5 milliGRAM(s) Oral every 6 hours PRN  polyethylene glycol 3350 17 Gram(s) Oral daily  prenatal multivitamin 1 Tablet(s) Oral daily  senna 2 Tablet(s) Oral at bedtime      Vital Signs Last 24 Hrs  T(C): 36.5 (07 Feb 2021 05:47), Max: 36.6 (06 Feb 2021 13:40)  T(F): 97.7 (07 Feb 2021 05:47), Max: 97.9 (06 Feb 2021 13:40)  HR: 69 (07 Feb 2021 05:47) (68 - 74)  BP: 122/80 (07 Feb 2021 05:47) (118/78 - 129/81)  BP(mean): --  RR: 18 (07 Feb 2021 05:47) (16 - 18)  SpO2: 96% (07 Feb 2021 05:47) (96% - 97%)    Physical Exam:  General:    NAD, non toxics  Cardio:    regular S1,S2  Respiratory: comfortable on RA  abd:   soft, BS +, not tender  :     no CVAT, no suprapubic tenderness, no dorman  Musculoskeletal : no joint swelling, no edema  Skin:    no rash  vascular: no phlebitis        02-07    x   |  x   |  x   ----------------------------<  x   x    |  x   |  0.72      TPro  5.7<L>  /  Alb  2.7<L>  /  TBili  0.2  /  DBili  <0.1  /  AST  118<H>  /  ALT  85<H>  /  AlkPhos  125<H>  02-07          MICROBIOLOGY:  v  .Urine Clean Catch (Midstream)  02-03-21   <10,000 CFU/mL Normal Urogenital Elizabeth  --  --                RADIOLOGY:  Images independently visualized and reviewed personally, findings as below  < from: CT Angio Chest w/ IV Cont (02.03.21 @ 14:00) >  IMPRESSION:    1.  No pulmonary embolus.  2.  The bilateral opacities can occur in the clinical setting of lung injury/atypical pneumonia/viral infection.        < end of copied text >

## 2021-02-07 NOTE — PROGRESS NOTE ADULT - SUBJECTIVE AND OBJECTIVE BOX
PÉREZ EWATHERS    R3 PP Note, POD#3    Patient seen and examined at bedside, no acute overnight events. No acute complaints.   Pt denies HA, epigastric pain, blurred vision, CP, SOB, N/V, fevers, and chills.  Patient is ambulating, tolerating a regular diet, voiding spontaneously, and passing flatus.    Vital Signs Last 24 Hours  T(C): 36.5 (02-06-21 @ 21:00), Max: 36.6 (02-06-21 @ 00:25)  HR: 74 (02-06-21 @ 21:00) (69 - 78)  BP: 118/78 (02-06-21 @ 21:00) (118/78 - 136/88)  RR: 18 (02-06-21 @ 21:00) (16 - 18)  SpO2: 96% (02-06-21 @ 21:00) (95% - 97%)    CAPILLARY BLOOD GLUCOSE          Physical Exam:  General: NAD  Abdomen: Soft, non-tender. Fundus firm at umbilicus. Transverse incision clean, dry, intact.   Ext: No pain or swelling  Pelvic: lochia wnl      Labs:             11.1   13.26 )-----------( 372      ( 02-05 @ 06:12 )             33.1     02-06 @ 07:15    x   |  x   |  x   ----------------------------<  x   x    |  x   |  0.67    Ca    9.1      02-05 @ 06:12    TPro  5.8  /  Alb  2.6  /  TBili  0.2  /  DBili  <0.1  /  AST  47  /  ALT  31  /  AlkPhos  149  02-06 @ 07:15        Uric Acid: (02-05 @ 06:12)  --       Fibrinogen: (02-05 @ 06:12)  --       LDH: (02-05 @ 06:12)  215        MEDICATIONS  (STANDING):  dexAMETHasone  Injectable 6 milliGRAM(s) IV Push every 24 hours  enoxaparin Injectable 40 milliGRAM(s) SubCutaneous every 24 hours  polyethylene glycol 3350 17 Gram(s) Oral daily  prenatal multivitamin 1 Tablet(s) Oral daily  remdesivir  IVPB   IV Intermittent   remdesivir  IVPB 100 milliGRAM(s) IV Intermittent every 24 hours  senna 2 Tablet(s) Oral at bedtime    MEDICATIONS  (PRN):  acetaminophen   Tablet .. 975 milliGRAM(s) Oral every 6 hours PRN Mild Pain (1 - 3)  ibuprofen  Tablet. 600 milliGRAM(s) Oral every 6 hours PRN Mild Pain (1 - 3)  lanolin Ointment 1 Application(s) Topical every 6 hours PRN Sore Nipples  naloxone Injectable 0.1 milliGRAM(s) IV Push every 3 minutes PRN For ANY of the following changes in patient status:  A. Breaths Per Minute LESS THAN 10, B. Oxygen saturation LESS THAN 90%, C. Sedation score of 6 for Stop After: 4 Times  oxyCODONE    IR 5 milliGRAM(s) Oral every 6 hours PRN Moderate Pain (4 - 6)                   PÉREZ WEATHERS    R3 PP Note, POD#4    Patient seen and examined at bedside, no acute overnight events. No acute complaints.   Pt denies HA, epigastric pain, blurred vision, CP, SOB, N/V, fevers, and chills.  Patient is ambulating, tolerating a regular diet, voiding spontaneously, and passing flatus.    Vital Signs Last 24 Hours  T(C): 36.5 (02-06-21 @ 21:00), Max: 36.6 (02-06-21 @ 00:25)  HR: 74 (02-06-21 @ 21:00) (69 - 78)  BP: 118/78 (02-06-21 @ 21:00) (118/78 - 136/88)  RR: 18 (02-06-21 @ 21:00) (16 - 18)  SpO2: 96% (02-06-21 @ 21:00) (95% - 97%)    CAPILLARY BLOOD GLUCOSE          Physical Exam:  General: NAD  Abdomen: Soft, non-tender. Fundus firm at umbilicus. Transverse incision clean, dry, intact.   Ext: No pain or swelling  Pelvic: lochia wnl      Labs:             11.1   13.26 )-----------( 372      ( 02-05 @ 06:12 )             33.1     02-06 @ 07:15    x   |  x   |  x   ----------------------------<  x   x    |  x   |  0.67    Ca    9.1      02-05 @ 06:12    TPro  5.8  /  Alb  2.6  /  TBili  0.2  /  DBili  <0.1  /  AST  47  /  ALT  31  /  AlkPhos  149  02-06 @ 07:15        Uric Acid: (02-05 @ 06:12)  --       Fibrinogen: (02-05 @ 06:12)  --       LDH: (02-05 @ 06:12)  215        MEDICATIONS  (STANDING):  dexAMETHasone  Injectable 6 milliGRAM(s) IV Push every 24 hours  enoxaparin Injectable 40 milliGRAM(s) SubCutaneous every 24 hours  polyethylene glycol 3350 17 Gram(s) Oral daily  prenatal multivitamin 1 Tablet(s) Oral daily  remdesivir  IVPB   IV Intermittent   remdesivir  IVPB 100 milliGRAM(s) IV Intermittent every 24 hours  senna 2 Tablet(s) Oral at bedtime    MEDICATIONS  (PRN):  acetaminophen   Tablet .. 975 milliGRAM(s) Oral every 6 hours PRN Mild Pain (1 - 3)  ibuprofen  Tablet. 600 milliGRAM(s) Oral every 6 hours PRN Mild Pain (1 - 3)  lanolin Ointment 1 Application(s) Topical every 6 hours PRN Sore Nipples  naloxone Injectable 0.1 milliGRAM(s) IV Push every 3 minutes PRN For ANY of the following changes in patient status:  A. Breaths Per Minute LESS THAN 10, B. Oxygen saturation LESS THAN 90%, C. Sedation score of 6 for Stop After: 4 Times  oxyCODONE    IR 5 milliGRAM(s) Oral every 6 hours PRN Moderate Pain (4 - 6)

## 2021-02-07 NOTE — DISCHARGE NOTE OB - CARE PLAN
Principal Discharge DX:	 delivery due to maternal disorder, delivered, [facility]  Goal:	recover  Assessment and plan of treatment:	Resume regular activities. Tolerating PO. Ambulating.   Principal Discharge DX:	 [facility] due to [facility]  Goal:	recovery  Assessment and plan of treatment:	Resume regular activities. Tolerating PO. Ambulating.

## 2021-02-07 NOTE — DISCHARGE NOTE OB - MEDICATION SUMMARY - MEDICATIONS TO TAKE
I will START or STAY ON the medications listed below when I get home from the hospital:    acetaminophen 325 mg oral tablet  -- 3 tab(s) by mouth every 6 hours, As needed, Mild Pain (1 - 3)  -- Indication: For Pain    ibuprofen 600 mg oral tablet  -- 1 tab(s) by mouth every 6 hours, As needed, Mild Pain (1 - 3)  -- Indication: For Pain    oxyCODONE 5 mg oral tablet  -- 1 tab(s) by mouth every 6 hours, As Needed -for severe pain MDD:4   -- Caution federal law prohibits the transfer of this drug to any person other  than the person for whom it was prescribed.  It is very important that you take or use this exactly as directed.  Do not skip doses or discontinue unless directed by your doctor.  May cause drowsiness or dizziness.  This prescription cannot be refilled.  Using more of this medication than prescribed may cause serious breathing problems.    -- Indication: For Severe pain    Lovenox 40 mg/0.4 mL injectable solution  -- 40 milligram(s) subcutaneously once a day   -- It is very important that you take or use this exactly as directed.  Do not skip doses or discontinue unless directed by your doctor.    -- Indication: For Prophylaxis for symptomatic COVID    Prenatal Multivitamins with Folic Acid 1 mg oral tablet  -- 1 tab(s) by mouth once a day  -- Indication: For Postpartum

## 2021-02-07 NOTE — DISCHARGE NOTE OB - CARE PROVIDER_API CALL
Celina Wilson)  Obstetrics and Gynecology  [address], [address]  [place] [address]  Phone: [phone]  Fax: [phone]  Follow Up Time:

## 2021-02-07 NOTE — PROGRESS NOTE ADULT - REASON FOR ADMISSION
hypoxia COVID+
hypoxia COVID+, now s/p delivery
hypoxia COVID+

## 2021-02-07 NOTE — PROGRESS NOTE ADULT - ATTENDING COMMENTS
OB attg note    41yo  now POD2 from 1'LTCS at 36+ weeks in setting of worsening SOB and COVID+ pneumonia. Initially was desatting on RA and placed on 4L NC, most recently now weaned to RA with normal O2 ast and reports SOB much improved. Currently on remdesevir and decadron per ID (2/3 - ), planned for 5d remdesivir and 10d decadron. Given clinical improvement, plan for transfer to OB. Pt feels well, occasional incisional discomfort but pain controlled with meds. Tolerating PO, ambulating, passing flatus. Will have lactational consultants see pt as pt with questions.    Vital Signs Last 24 Hrs  T(C): 36.4 (2021 04:37), Max: 36.4 (2021 20:55)  T(F): 97.5 (2021 04:37), Max: 97.5 (2021 20:55)  HR: 79 (2021 04:37) (79 - 90)  BP: 118/80 (2021 04:37) (118/80 - 128/86)  BP(mean): --  RR: 16 (2021 10:26) (16 - 17)  SpO2: 96% (2021 10:26) (96% - 97%)    Gen: nad  Abd: soft, staples in situ    Plan  - appreciate medicine care and ID input, plan to transfer to OB  - continue remdesivir and decadron  - plan for 2 weeks of lovenox on discharge given postop + hx covid  - RN to call for lactational consultant  - routine postop care    Zane PICHARDO
Pateint doing well  No acute complaints  Tolerating PO  D/C Son   Encourage ambulation  COVID management per medicine
39y/o  POD3 s/p pLTCS due to increasing oxygen demands in the setting of COVID. Pt now saturating well on room air. From ob standpoint patient doing well. Tolerating PO, voiding, ambulating.  Pain well controlled. Incision with staples, attempted removal but incision not fully healed so only 3 staples removed and steri-strips applied. Will have patient follow up outpatient for staple removal.    -On Redmesivir (2/3-) to complete 5 day course  -On Decadron (2/3-) for 10 day course    Will follow up with ID for duration of treatment whether to be inpatient or outpatient.
Pt seen and evaluated at bedside. 41y/o  POD#4 s/p pLTCS due to increasing oxygen demands in the setting of COVID. Pt now saturating well on room air. From ob standpoint patient doing well. Tolerating PO, voiding, ambulating.  Pain well controlled.     -On Redmesivir (2/3-) to complete 5 day course  -On Decadron (2/3-) for 10 day course, however can be completed outpatient.    ID to give final recommendations today regarding patient discharge.    Iris Martinez MD

## 2021-02-07 NOTE — PROGRESS NOTE ADULT - ASSESSMENT
41y/o  POD3 s/p pLTCS due to increasing oxygen demands in the setting of COVID. Pt now saturating well on room air. Pt stable meeting postoperative milestones.  -Continue Tylenol, motrin, oxy PRN for pain management   -Increase ambulation  -Continue regular diet   -C/w remdesivir (2/3-), last dose due today  -C/w decadron until discharge  -Appreciate ID recommendations  -Discharge planning pending last Remdesivir dose    Mariola Rios PGY3     39y/o  POD4 s/p pLTCS due to increasing oxygen demands in the setting of COVID. Pt now saturating well on room air. Pt stable meeting postoperative milestones.  -Continue Tylenol, motrin, oxy PRN for pain management   -Increase ambulation  -Continue regular diet   -C/w remdesivir (2/3-), last dose due today  -C/w decadron until discharge  -Appreciate ID recommendations  -Discharge planning pending last Remdesivir dose    Mariola Rios PGY3

## 2021-02-07 NOTE — DISCHARGE NOTE OB - PATIENT PORTAL LINK FT
You can access the FollowMyHealth Patient Portal offered by [facility] by registering at the following website: [email or web address]. By joining [employer]’s FollowMyHealth portal, you will also be able to view your health information using other applications (apps) compatible with our system.

## 2021-02-07 NOTE — DISCHARGE NOTE OB - HOSPITAL COURSE
41y/o  admitted 2/3 due to hypoxia with COVID positive status. Pt underwent pLTCS @36w4d due to increasing oxygen demands in the setting of COVID on 2/3. Pt had an uneventful postpartum recovery course and was easily weaned off of NC. Pt followed by ID. On Redmesivir (2/3-). On Decadron (2/3-) for 10 day course.     Incision with staples, attempted removal () but incision not fully healed so only 3 staples removed and steri-strips applied. Will have patient follow up outpatient for staple removal.    Pt to be discharged home on lovenox for 2 weeks.

## 2021-02-08 ENCOUNTER — APPOINTMENT (OUTPATIENT)
Dept: OBGYN | Facility: CLINIC | Age: 41
End: 2021-02-08

## 2021-02-08 ENCOUNTER — APPOINTMENT (OUTPATIENT)
Dept: ANTEPARTUM | Facility: CLINIC | Age: 41
End: 2021-02-08

## 2021-02-09 ENCOUNTER — APPOINTMENT (OUTPATIENT)
Dept: OBGYN | Facility: CLINIC | Age: 41
End: 2021-02-09
Payer: COMMERCIAL

## 2021-02-09 VITALS
SYSTOLIC BLOOD PRESSURE: 120 MMHG | DIASTOLIC BLOOD PRESSURE: 80 MMHG | WEIGHT: 243.5 LBS | TEMPERATURE: 95.5 F | BODY MASS INDEX: 36.9 KG/M2 | HEIGHT: 68 IN

## 2021-02-09 DIAGNOSIS — O44.40 LOW LYING PLACENTA NOS OR WITHOUT HEMORRHAGE, UNSPECIFIED TRIMESTER: ICD-10-CM

## 2021-02-09 DIAGNOSIS — O09.522 SUPERVISION OF ELDERLY MULTIGRAVIDA, SECOND TRIMESTER: ICD-10-CM

## 2021-02-09 PROCEDURE — 0503F POSTPARTUM CARE VISIT: CPT

## 2021-02-09 NOTE — HISTORY OF PRESENT ILLNESS
[Complications:___] : complications include: [unfilled] [Delivery Date: ___] : on [unfilled] [Primary C/S] : delivered by  section [Male] : Delivery History: baby boy [] : was  [Erythematous] : erythema [FreeTextEntry8] : Patient admitted for COVID and delivered at 36 weeks for worsening respiratory symptoms. She is here for staple removal. She feels tired as baby is up a lot and she is recovering  [Breastfeeding] : not currently nursing [de-identified] : Staples removed and wound open right pole about 2 CM. Wound cleaned. Fascia is intact . Wound packed with iodoform

## 2021-02-17 ENCOUNTER — APPOINTMENT (OUTPATIENT)
Dept: OBGYN | Facility: CLINIC | Age: 41
End: 2021-02-17
Payer: COMMERCIAL

## 2021-02-17 VITALS
BODY MASS INDEX: 37.13 KG/M2 | TEMPERATURE: 96.8 F | HEIGHT: 68 IN | SYSTOLIC BLOOD PRESSURE: 120 MMHG | DIASTOLIC BLOOD PRESSURE: 86 MMHG | WEIGHT: 245 LBS

## 2021-02-17 PROCEDURE — 99072 ADDL SUPL MATRL&STAF TM PHE: CPT

## 2021-02-17 PROCEDURE — 12021 TX SUPFC WND DEHSN W/PACKING: CPT

## 2021-02-17 NOTE — PHYSICAL EXAM
[FreeTextEntry7] : wound healing well. Good granulation tissue. Cleaned and re packed with aquasel.

## 2021-02-23 ENCOUNTER — APPOINTMENT (OUTPATIENT)
Dept: OBGYN | Facility: CLINIC | Age: 41
End: 2021-02-23
Payer: COMMERCIAL

## 2021-02-23 VITALS
TEMPERATURE: 96 F | BODY MASS INDEX: 36.83 KG/M2 | WEIGHT: 243 LBS | SYSTOLIC BLOOD PRESSURE: 130 MMHG | HEIGHT: 68 IN | DIASTOLIC BLOOD PRESSURE: 76 MMHG

## 2021-02-23 PROCEDURE — 99072 ADDL SUPL MATRL&STAF TM PHE: CPT

## 2021-02-23 PROCEDURE — 12021 TX SUPFC WND DEHSN W/PACKING: CPT | Mod: 58

## 2021-02-23 NOTE — HISTORY OF PRESENT ILLNESS
[FreeTextEntry1] : Follow up for wound care\par Patient feels well\par Visiting nurse 3 x a week\par No fever

## 2021-03-05 ENCOUNTER — APPOINTMENT (OUTPATIENT)
Dept: OBGYN | Facility: CLINIC | Age: 41
End: 2021-03-05
Payer: COMMERCIAL

## 2021-03-05 VITALS
DIASTOLIC BLOOD PRESSURE: 82 MMHG | SYSTOLIC BLOOD PRESSURE: 117 MMHG | BODY MASS INDEX: 36.98 KG/M2 | HEIGHT: 68 IN | WEIGHT: 244 LBS

## 2021-03-05 PROCEDURE — 99024 POSTOP FOLLOW-UP VISIT: CPT

## 2021-03-05 NOTE — PHYSICAL EXAM
[FreeTextEntry7] : Wound open but smaller than previous. No sign of infection. Good granulation tissue . Wound cleaned and re packed

## 2021-03-17 ENCOUNTER — APPOINTMENT (OUTPATIENT)
Dept: OBGYN | Facility: CLINIC | Age: 41
End: 2021-03-17
Payer: COMMERCIAL

## 2021-03-17 VITALS — TEMPERATURE: 96.8 F

## 2021-03-17 VITALS — BODY MASS INDEX: 37.4 KG/M2 | SYSTOLIC BLOOD PRESSURE: 118 MMHG | DIASTOLIC BLOOD PRESSURE: 72 MMHG | WEIGHT: 246 LBS

## 2021-03-17 PROCEDURE — 99396 PREV VISIT EST AGE 40-64: CPT | Mod: 25

## 2021-03-17 PROCEDURE — 99212 OFFICE O/P EST SF 10 MIN: CPT | Mod: 25

## 2021-03-17 PROCEDURE — 99072 ADDL SUPL MATRL&STAF TM PHE: CPT

## 2021-03-17 RX ORDER — CEPHALEXIN 500 MG/1
500 CAPSULE ORAL 4 TIMES DAILY
Qty: 28 | Refills: 0 | Status: DISCONTINUED | COMMUNITY
Start: 2021-02-09 | End: 2021-03-17

## 2021-03-17 RX ORDER — ENOXAPARIN SODIUM 100 MG/ML
40 INJECTION SUBCUTANEOUS DAILY
Qty: 30 | Refills: 6 | Status: DISCONTINUED | COMMUNITY
Start: 2021-02-09 | End: 2021-03-17

## 2021-03-17 RX ORDER — OXYCODONE 5 MG/1
5 TABLET ORAL
Qty: 30 | Refills: 0 | Status: DISCONTINUED | COMMUNITY
Start: 2021-02-09 | End: 2021-03-17

## 2021-03-17 NOTE — PHYSICAL EXAM
[Appropriately responsive] : appropriately responsive [Alert] : alert [No Acute Distress] : no acute distress [No Lymphadenopathy] : no lymphadenopathy [Regular Rate Rhythm] : regular rate rhythm [No Murmurs] : no murmurs [Clear to Auscultation B/L] : clear to auscultation bilaterally [Soft] : soft [Non-tender] : non-tender [Non-distended] : non-distended [No HSM] : No HSM [No Lesions] : no lesions [No Mass] : no mass [Oriented x3] : oriented x3 [FreeTextEntry7] : wound almost closed  [Examination Of The Breasts] : a normal appearance [No Masses] : no breast masses were palpable [Labia Majora] : normal [Labia Minora] : normal [Normal] : normal [Uterine Adnexae] : normal

## 2021-03-17 NOTE — PLAN
[FreeTextEntry1] : Birth control options discussed \par Patient would like a Mirena IUD\par Will get mammo when 6 months post partum \par Wound healing well \par Would continue just regular cleaning

## 2021-03-17 NOTE — HISTORY OF PRESENT ILLNESS
[FreeTextEntry1] : Patient s/p C/S 6 weeks ago \par Had COVID at that time\par Had a wound infection post operatively\par It is healing well and is no longer packing it or having the visiting nurse  [Patient reported mammogram was normal] : Patient reported mammogram was normal [Patient reported PAP Smear was normal] : Patient reported PAP Smear was normal [Mammogramdate] : 2020 [PapSmeardate] : 2018

## 2021-03-22 LAB — CYTOLOGY CVX/VAG DOC THIN PREP: NORMAL

## 2021-04-04 NOTE — ED PROVIDER NOTE - CARDIAC HEART SOUNDS
"Chief Complaint  Abdominal Pain (recent CT skin) and Follow-up    Subjective          Gurwinder Harding presents to [facility] for   History of Present Illness  Returns today follow up after recent CT scan abd.  Findings discussed and will keep follow up with GI in May.    Energy improved after reduction of blood pressure meds.  No further dizziness upon standing.  Feeling somewhat better    Objective   Vital Signs:   /80   Pulse 85   Temp 96.9 °F (36.1 °C) (Temporal)   Ht 177.8 cm (70\")   Wt 84.5 kg (186 lb 3.2 oz)   SpO2 100%   BMI 26.72 kg/m²     Allergies:   Allergies   Allergen Reactions   • Penicillins Hives and Shortness Of Breath     As A child   • Novocain [Procaine] Nausea And Vomiting        Physical Exam  Vitals and nursing note reviewed.   Constitutional:       General: He is not in acute distress.     Appearance: He is well-developed. He is not ill-appearing.   Cardiovascular:      Rate and Rhythm: Normal rate and regular rhythm.      Heart sounds: Normal heart sounds. No murmur heard.     Pulmonary:      Effort: Pulmonary effort is normal.      Breath sounds: Normal breath sounds.   Skin:     General: Skin is warm and dry.   Neurological:      Mental Status: He is alert and oriented to person, place, and time.   Psychiatric:         Behavior: Behavior normal.          Result Review :         During this visit the following were done:  Labs Reviewed []    Labs Ordered []    Radiology Reports Reviewed []    Radiology Ordered []    PCP Records Reviewed []    Referring Provider Records Reviewed []    ER Records Reviewed []    Hospital Records Reviewed []    History Obtained From Family []    Radiology Images Reviewed [x]    Other Reviewed []    Records Requested []               Assessment and Plan    Problem List Items Addressed This Visit        Cardiac and Vasculature    Hypertension - Primary (Chronic)  Continue with current medications         Other    Left " upper quadrant abdominal pain  Continue with upcoming GI appt                   Follow Up   Return in about 3 months (around 6/30/2021), or if symptoms worsen or fail to improve, for Recheck.  Patient was given instructions and counseling regarding his condition or for health maintenance advice. Please see specific information pulled into the AVS if appropriate.        S1-S2

## 2021-04-21 ENCOUNTER — NON-APPOINTMENT (OUTPATIENT)
Age: 41
End: 2021-04-21

## 2021-04-28 ENCOUNTER — APPOINTMENT (OUTPATIENT)
Dept: OBGYN | Facility: CLINIC | Age: 41
End: 2021-04-28
Payer: COMMERCIAL

## 2021-04-28 VITALS
DIASTOLIC BLOOD PRESSURE: 85 MMHG | TEMPERATURE: 96.9 F | HEIGHT: 68 IN | WEIGHT: 246 LBS | SYSTOLIC BLOOD PRESSURE: 125 MMHG | BODY MASS INDEX: 37.28 KG/M2

## 2021-04-28 DIAGNOSIS — Z01.419 ENCOUNTER FOR GYNECOLOGICAL EXAMINATION (GENERAL) (ROUTINE) W/OUT ABNORMAL FINDINGS: ICD-10-CM

## 2021-04-28 PROCEDURE — 81025 URINE PREGNANCY TEST: CPT

## 2021-04-28 PROCEDURE — 76857 US EXAM PELVIC LIMITED: CPT

## 2021-04-28 PROCEDURE — 99072 ADDL SUPL MATRL&STAF TM PHE: CPT

## 2021-04-28 PROCEDURE — 58300 INSERT INTRAUTERINE DEVICE: CPT

## 2021-04-28 NOTE — PROCEDURE
[Locate IUD] : locate IUD [Transvaginal Ultrasound] : transvaginal ultrasound [IUD Placement] : intrauterine device (IUD) placement [Time out performed] : Pre-procedure time out performed.  Patient's name, date of birth and procedure confirmed. [Consent Obtained] : Consent obtained [Risks] : risks [Benefits] : benefits [Alternatives] : alternatives [Patient] : patient [Infection] : infection [Bleeding] : bleeding [Pain] : pain [Expulsion] : expulsion [Failure] : failure [Uterine Perforation] : uterine perforation [Neg Pregnancy Test] : negative pregnancy test [Betadine] : Betadine [Tenaculum] : Tenaculum [Easy Passage] : Easy passage [Mirena IUD] : Mirena IUD [Tolerated Well] : Patient tolerated the procedure well [No Complications] : No complications [None] : None [FreeTextEntry3] : IUD noted in uterus  [de-identified] : [id number]  [de-identified] : 7/23

## 2021-06-02 ENCOUNTER — APPOINTMENT (OUTPATIENT)
Dept: OBGYN | Facility: CLINIC | Age: 41
End: 2021-06-02
Payer: COMMERCIAL

## 2021-06-02 VITALS
BODY MASS INDEX: 36.83 KG/M2 | SYSTOLIC BLOOD PRESSURE: 126 MMHG | TEMPERATURE: 96.6 F | HEIGHT: 68 IN | DIASTOLIC BLOOD PRESSURE: 81 MMHG | WEIGHT: 243 LBS

## 2021-06-02 DIAGNOSIS — Z30.430 ENCOUNTER FOR INSERTION OF INTRAUTERINE CONTRACEPTIVE DEVICE: ICD-10-CM

## 2021-06-02 PROCEDURE — 99072 ADDL SUPL MATRL&STAF TM PHE: CPT

## 2021-06-02 PROCEDURE — 99212 OFFICE O/P EST SF 10 MIN: CPT | Mod: 25

## 2021-06-02 PROCEDURE — 76857 US EXAM PELVIC LIMITED: CPT

## 2021-06-02 NOTE — PROCEDURE
[Locate IUD] : locate IUD [Transvaginal Ultrasound] : transvaginal ultrasound [FreeTextEntry3] : IUD seen in uterus

## 2021-06-02 NOTE — PHYSICAL EXAM
[Labia Majora] : normal [Labia Minora] : normal [Scant] : There was scant vaginal bleeding [IUD String] : an IUD string was noted [Normal] : normal [Uterine Adnexae] : normal

## 2021-07-16 DIAGNOSIS — R92.2 INCONCLUSIVE MAMMOGRAM: ICD-10-CM

## 2021-07-22 ENCOUNTER — NON-APPOINTMENT (OUTPATIENT)
Age: 41
End: 2021-07-22

## 2021-07-26 NOTE — H&P ADULT - NSHPSOCIALHISTORY_GEN_ALL_CORE
Lives at home, everyone COVID+ in home including 3 year old son. No smoking, no drinking
Pt keo jacinto at 3pm per Dr. Broussard pt has to wait 2 hours before going into OR

## 2021-07-28 ENCOUNTER — APPOINTMENT (OUTPATIENT)
Dept: ULTRASOUND IMAGING | Facility: CLINIC | Age: 41
End: 2021-07-28
Payer: COMMERCIAL

## 2021-07-28 ENCOUNTER — APPOINTMENT (OUTPATIENT)
Dept: MAMMOGRAPHY | Facility: CLINIC | Age: 41
End: 2021-07-28
Payer: COMMERCIAL

## 2021-07-28 ENCOUNTER — RESULT REVIEW (OUTPATIENT)
Age: 41
End: 2021-07-28

## 2021-07-28 ENCOUNTER — OUTPATIENT (OUTPATIENT)
Dept: OUTPATIENT SERVICES | Facility: HOSPITAL | Age: 41
LOS: 1 days | End: 2021-07-28
Payer: COMMERCIAL

## 2021-07-28 DIAGNOSIS — Z00.8 ENCOUNTER FOR OTHER GENERAL EXAMINATION: ICD-10-CM

## 2021-07-28 DIAGNOSIS — Z98.890 OTHER SPECIFIED POSTPROCEDURAL STATES: Chronic | ICD-10-CM

## 2021-07-28 PROCEDURE — 77063 BREAST TOMOSYNTHESIS BI: CPT | Mod: 26

## 2021-07-28 PROCEDURE — 77067 SCR MAMMO BI INCL CAD: CPT | Mod: 26

## 2021-07-28 PROCEDURE — 77063 BREAST TOMOSYNTHESIS BI: CPT

## 2021-07-28 PROCEDURE — 77067 SCR MAMMO BI INCL CAD: CPT

## 2021-07-28 PROCEDURE — 76641 ULTRASOUND BREAST COMPLETE: CPT | Mod: 26,50

## 2021-07-28 PROCEDURE — 76641 ULTRASOUND BREAST COMPLETE: CPT

## 2021-08-10 NOTE — OB NEONATOLOGY/PEDIATRICIAN DELIVERY SUMMARY - BABY A: APGAR 5 MIN MUSCLE TONE, DELIVERY
Pt states that she started a new medication for acne and was experiencing dizzy, shaking and hot and cold sweats.  Change the time she was taking medication to just at night but woke today and the symptoms wont subside   (1) flexion of extremities

## 2021-09-01 NOTE — H&P PST ADULT - PRO ARRIVE FROM
home
Render Note In Bullet Format When Appropriate: No
Detail Level: Detailed
Show Aperture Variable?: Yes
Post-Care Instructions: I reviewed with the patient in detail post-care instructions. Patient is to wear sunprotection, and avoid picking at any of the treated lesions. Pt may apply Vaseline to crusted or scabbing areas.
Duration Of Freeze Thaw-Cycle (Seconds): 3
Number Of Freeze-Thaw Cycles: 2 freeze-thaw cycles
Consent: The patient's consent was obtained including but not limited to risks of crusting, scabbing, blistering, scarring, darker or lighter pigmentary change, recurrence, incomplete removal and infection.

## 2021-09-24 ENCOUNTER — APPOINTMENT (OUTPATIENT)
Dept: FAMILY MEDICINE | Facility: CLINIC | Age: 41
End: 2021-09-24
Payer: COMMERCIAL

## 2021-09-24 VITALS
DIASTOLIC BLOOD PRESSURE: 71 MMHG | HEIGHT: 68.5 IN | SYSTOLIC BLOOD PRESSURE: 115 MMHG | OXYGEN SATURATION: 100 % | HEART RATE: 77 BPM | TEMPERATURE: 98.1 F | WEIGHT: 211 LBS | BODY MASS INDEX: 31.61 KG/M2

## 2021-09-24 DIAGNOSIS — Z82.49 FAMILY HISTORY OF ISCHEMIC HEART DISEASE AND OTHER DISEASES OF THE CIRCULATORY SYSTEM: ICD-10-CM

## 2021-09-24 PROCEDURE — 99386 PREV VISIT NEW AGE 40-64: CPT | Mod: 25

## 2021-09-24 PROCEDURE — 93010 ELECTROCARDIOGRAM REPORT: CPT | Mod: 59

## 2021-09-24 PROCEDURE — G0444 DEPRESSION SCREEN ANNUAL: CPT | Mod: NC,59

## 2021-09-24 PROCEDURE — G0008: CPT

## 2021-09-24 PROCEDURE — 90686 IIV4 VACC NO PRSV 0.5 ML IM: CPT

## 2021-09-24 RX ORDER — ELASTIC BANDAGE 2"X2.2YD
0.8 BANDAGE TOPICAL
Refills: 0 | Status: COMPLETED | COMMUNITY
Start: 2017-05-23 | End: 2021-09-24

## 2021-09-24 RX ORDER — MOMETASONE 50 UG/1
50 SPRAY, METERED NASAL
Qty: 17 | Refills: 0 | Status: COMPLETED | COMMUNITY
Start: 2021-09-07

## 2021-09-24 RX ORDER — BENZONATATE 100 MG/1
100 CAPSULE ORAL
Qty: 60 | Refills: 0 | Status: COMPLETED | COMMUNITY
Start: 2021-07-12

## 2021-09-24 RX ORDER — LEVONORGESTREL 52 MG/1
20 INTRAUTERINE DEVICE INTRAUTERINE
Qty: 1 | Refills: 0 | Status: ACTIVE | COMMUNITY
Start: 2021-09-24

## 2021-09-24 RX ORDER — METHYLPREDNISOLONE 4 MG/1
4 TABLET ORAL
Qty: 21 | Refills: 0 | Status: COMPLETED | COMMUNITY
Start: 2021-07-12

## 2021-09-24 RX ORDER — AMOXICILLIN AND CLAVULANATE POTASSIUM 875; 125 MG/1; MG/1
875-125 TABLET, COATED ORAL
Qty: 20 | Refills: 0 | Status: COMPLETED | COMMUNITY
Start: 2021-07-12

## 2021-09-24 NOTE — PAST MEDICAL HISTORY
[Menstruating] : menstruating [Definite ___ (Date)] : the last menstrual period was [unfilled] [Regular Cycle Intervals] : have been regular [Total Preg ___] : G[unfilled] [Live Births ___] : P[unfilled]  [Premature ___] : Premature: [unfilled]

## 2021-10-29 ENCOUNTER — APPOINTMENT (OUTPATIENT)
Dept: FAMILY MEDICINE | Facility: CLINIC | Age: 41
End: 2021-10-29
Payer: COMMERCIAL

## 2021-10-29 VITALS
DIASTOLIC BLOOD PRESSURE: 78 MMHG | BODY MASS INDEX: 31.25 KG/M2 | WEIGHT: 211 LBS | RESPIRATION RATE: 16 BRPM | OXYGEN SATURATION: 98 % | HEART RATE: 73 BPM | HEIGHT: 69 IN | SYSTOLIC BLOOD PRESSURE: 127 MMHG | TEMPERATURE: 97.2 F

## 2021-10-29 PROCEDURE — 99213 OFFICE O/P EST LOW 20 MIN: CPT

## 2021-10-29 NOTE — PHYSICAL EXAM
[Normal] : normal gait, coordination grossly intact, no focal deficits and deep tendon reflexes were 2+ and symmetric [No Acute Distress] : no acute distress [Well Nourished] : well nourished [Well Developed] : well developed [Well-Appearing] : well-appearing [Normal Outer Ear/Nose] : the outer ears and nose were normal in appearance [Normal Oropharynx] : the oropharynx was normal [No Lymphadenopathy] : no lymphadenopathy [Supple] : supple [No Respiratory Distress] : no respiratory distress  [No Accessory Muscle Use] : no accessory muscle use [Clear to Auscultation] : lungs were clear to auscultation bilaterally [Normal Rate] : normal rate  [Regular Rhythm] : with a regular rhythm [Normal S1, S2] : normal S1 and S2 [No Murmur] : no murmur heard [Soft] : abdomen soft [Non Tender] : non-tender [Non-distended] : non-distended [No Masses] : no abdominal mass palpated [No HSM] : no HSM [Normal Bowel Sounds] : normal bowel sounds [Normal Posterior Cervical Nodes] : no posterior cervical lymphadenopathy [Normal Anterior Cervical Nodes] : no anterior cervical lymphadenopathy [No CVA Tenderness] : no CVA  tenderness [No Spinal Tenderness] : no spinal tenderness [No Joint Swelling] : no joint swelling [Grossly Normal Strength/Tone] : grossly normal strength/tone [No Focal Deficits] : no focal deficits [Normal Gait] : normal gait [Normal Affect] : the affect was normal [Normal Insight/Judgement] : insight and judgment were intact

## 2021-10-29 NOTE — REVIEW OF SYSTEMS
[Postnasal Drip] : postnasal drip [Insomnia] : insomnia [Anxiety] : anxiety [Depression] : depression [Negative] : Heme/Lymph

## 2021-10-29 NOTE — ASSESSMENT
[FreeTextEntry1] : 42 yo F with obesity, newly diagnosed depression here for follow up.\par \par \par # Depression \par - Under control, stable\par - Continue Lexapro 10 mg qd \par -  Pt was advised to see a therapist along with taking her medication \par \par #Obesity \par - Pt has lost 7 lbs since the last visit on the new diet program called Callibrate\par - Pt was newly put on Metformin 500 mg qd and Ozempic .25 mg/weeky for this month, will continue\par - Discussed that pt should monitor sx of hypoglycemia though less likely with metformin\par \par - F/u in 1 year for CPE\par

## 2021-10-29 NOTE — HISTORY OF PRESENT ILLNESS
[FreeTextEntry1] : follow up [de-identified] : 42 yo F with obesity, newly diagnosed depression here for follow up. pt was started on Lexapro 10 mg qd last month. Pt has been taking medication as prescribed. Pt states that depression has improved, pt is feeling less exhausted, is concentrating more. Pt started looking into therapy - [facility]. Denies any side effects. Pt is also on a new diet called Callibrate recently, was started on metformin 500 mg qd and ozempic .25 weekly which she took this week. \par Pt had blood work done this month with lab bruna, everything was wnl.\par Pt denies fever, chills, body aches, cp, sob, palpitations, abdominal pain, n/v.

## 2021-11-04 NOTE — HISTORY OF PRESENT ILLNESS
[FreeTextEntry1] : cpe [de-identified] : 42 yo Female patient without significant PMHx who presents today for annual physical. Patient reports that has been feeling depressed, poor concentration, insomnia for 5 months since she had a baby. Patient at this time denies suicidal ideation or plan, headaches, chest pain, palpitations, decreased appetite or other symptoms.

## 2021-11-04 NOTE — ASSESSMENT
[FreeTextEntry1] : 42 yo Female patient without significant PMHx who presents today for annual physical. Patient reports that has been feeling depress, poor concentration, insomnia for 5 months since she had a baby. \par \par # Wellness visit \par - Will order routine labs \par - EKG shows bradycardia @ 50 bpm \par - Received Flu vaccine today \par - Pap smear with mammogram up to date\par \par \par \par # Depression \par - Would recommended to see a therapist \par - Will start Lexapro 10 mg once a day \par - Follow up in 4-6 weeks

## 2021-11-04 NOTE — HEALTH RISK ASSESSMENT
[Good] : ~his/her~  mood as  good [2] : 2) Feeling down, depressed, or hopeless for more than half of the days (2) [PHQ-2 Positive] : PHQ-2 Positive [I have developed a follow-up plan documented below in the note.] : I have developed a follow-up plan documented below in the note. [Patient reported mammogram was normal] : Patient reported mammogram was normal [Patient reported PAP Smear was normal] : Patient reported PAP Smear was normal [HIV test declined] : HIV test declined [Hepatitis C test declined] : Hepatitis C test declined [None] : None [With Family] : lives with family [Employed] : employed [] :  [# Of Children ___] : has [unfilled] children [Sexually Active] : sexually active [Feels Safe at Home] : Feels safe at home [Fully functional (bathing, dressing, toileting, transferring, walking, feeding)] : Fully functional (bathing, dressing, toileting, transferring, walking, feeding) [Smoke Detector] : smoke detector [Seat Belt] :  uses seat belt [No falls in past year] : Patient reported no falls in the past year [No] : In the past 12 months have you used drugs other than those required for medical reasons? No [Nearly Every Day (3)] : 4.) Feeling tired or having little energy? Nearly every day [1/2 of Days or More (2)] : 7.) Trouble concentrating on things, such as reading a newspaper or watching television? Half the days or more [Not at All (0)] : 8.) Moving or speaking so slowly that other people could have noticed, or the opposite, moving or speaking faster than usual? Not at all [Moderate] : severity of depression is moderate [Somewhat Difficult] : How difficult have these problems made it for you to do your work, take care of things at home, or get along with people? Somewhat difficult [Patient/Caregiver not ready to engage] : , patient/caregiver not ready to engage [] : No [YearQuit] : 1/2 PPD/11 years, quiet 2007 [LEU8Foabx] : 4 [ZQG1RbewoVbssu] : 12 [Change in mental status noted] : No change in mental status noted [Language] : denies difficulty with language [Handling Complex Tasks] : denies difficulty handling complex tasks [Reports changes in hearing] : Reports no changes in hearing [Reports changes in vision] : Reports no changes in vision [Reports changes in dental health] : Reports no changes in dental health [MammogramDate] : 07/2021 [PapSmearDate] : 03/2021 [FreeTextEntry2] : C

## 2021-12-16 ENCOUNTER — RX RENEWAL (OUTPATIENT)
Age: 41
End: 2021-12-16

## 2022-01-06 NOTE — PATIENT PROFILE OB - BABYS CARE PROVIDER NAME, OB PROFILE
Zenobia

## 2022-03-07 ENCOUNTER — RX RENEWAL (OUTPATIENT)
Age: 42
End: 2022-03-07

## 2022-03-17 NOTE — ED PROVIDER NOTE - CROS ED RESP ALL NEG
Barbara Delcid is a 89 year old male presenting with Lesion on left forehead - painful to touch  Medication verified, no changes  Denies known Latex allergy or symptoms of Latex sensitivity   Tobacco history verified.  Verbal permission granted by patient to leave a detailed message with medical information on answering machine at phone number given? yes  If female, are you pregnant, trying to become pregnant, or breastfeeding? NA       - - -

## 2022-05-04 ENCOUNTER — NON-APPOINTMENT (OUTPATIENT)
Age: 42
End: 2022-05-04

## 2022-05-17 NOTE — PRE-ANESTHESIA EVALUATION ADULT - NSANTHAPLANRD_GEN_ALL_CORE
"     Office Note      Date: 2022  Patient Name: Renae Bolton  MRN: [id number]  : 1974    Patient presents during the COVID-19 pandemic/federally declared Critical access hospital of public health emergency.  This service was conducted via Zoom.  Patient is located at her home.  Provider is located at her primary office location. The use of a video visit has been reviewed with the patient and verbal informed consent has been obtained.  Subjective  Subjective     Chief Complaint  Obesity Management follow-up    Subjective          Renae Bolton presents to [facility] via telehealth for obesity management. s/p LSG/HHR 19 w/ Dr. Onofre. Pre-surgery weight: 232.5, gonzalez 183lb, final goal 150lb.     Patient is unsatisfied with weight loss progress. Appetite is well controlled. Reports no side effects of prescribed medications today. No food journal. The patient is exercising with a  twice a week, tries to walk the other days. Grieving the loss of her son's/DIL pregnancy. At home with vomiting today.     Review of Systems   Constitutional: Negative for fatigue.   Eyes: Positive for visual disturbance (presbyopia, using readers more).   Cardiovascular: Negative for chest pain, palpitations and leg swelling.   Gastrointestinal: Positive for vomiting (started last night) and indigestion. Negative for abdominal pain, constipation, diarrhea, nausea and GERD.   Endocrine: Negative for polyphagia and polyuria.   Neurological: Negative for dizziness, tremors, light-headedness, headache and memory problem.        Parasthesias negative   Psychiatric/Behavioral: Positive for depressed mood and stress. Negative for sleep disturbance. The patient is not nervous/anxious.      Objective   Body mass index is 27.38 kg/m².  Start weight: 193 pounds.    Total weight loss MWM: -34 pounds  Change in weight since last visit: +2lb    Measurements (in inches)  Waist: 35\"  /63   " "Pulse 75   Ht 162.6 cm (64\")   Wt 72.3 kg (159 lb 8 oz)   BMI 27.38 kg/m²       Result Review :                 Assessment and Plan    Diagnoses and all orders for this visit:    1. Overweight (Primary)  Assessment & Plan:  Patient's (Body mass index is 27.38 kg/m².) indicates that they are overweight with health conditions that include obstructive sleep apnea, dyslipidemias, GERD and back pain, anxiety and depression, metabolic syndrome . Weight is improving with treatment. BMI is is above average; BMI management plan is completed. We discussed low calorie, low carb based diet program, portion control, increasing exercise, management of depression/anxiety/stress to control compensatory eating, pharmacologic options including saxenda, naltrexone, phentermine and an radha-based approach such as [employer] Pal or Lose It.    I have instructed the patient to continue with pursuit of medical weight loss as a part of this program. Patient does meet criteria for use of anorectics at this time as BMI >27 and is not at treatment goal.     Continue nutritional focus and work towards new exercise FITT goal of: 2-2-4-2. Add 2-3 more days of cardio.   The current plan for this month includes: adjust exercise as discussed and continue nutrition focus, bring food journal to next visit. Goal 3-5lb.     Continue sympathomimetic (medication refilled).  This patient 1) has no evidence of serious cardiovascular disease; 2) does not have serious psychiatric disease or a history of substance abuse; 3) has been informed about weight loss medications that are FDA approved for long-term use and told that these have been all documented to be safe and effective whereas phentermine has not; 4) does not demonstrate a clinically significant increase in pulse or blood pressure when taking phentermine; and 5) demonstrate significant weight loss while using the medication.  Patient understands that all antiobesity medications are contraindicated " "in pregnancy.  Patient denies a history of glaucoma.  Patient understands that long-term use of phentermine is considered off label use of this medication, however, that the endocrine Society and recent research supports that long-term use of phentermine does not appear to have detrimental health effects when used and the appropriate patient.  In addition, a 2019 study published in an obesity journal on [id number] patient's concluded that \"recommendations to limit phentermine to less than 3 months do not align with current concept of pharmacologic treatment of obesity\", and that \"long-term phentermine users experience greater weight loss without apparent increases in cardiovascular risk\".    We reviewed potential side effects including insomnia, dry mouth, increased heart rate and blood pressure, increased anxiety.  We reviewed reducing caffeine consumption while taking phentermine.  especially if the patient is experience side effects.  The potential risk and benefits of phentermine have been reviewed with the patient, and alternative treatment options were discussed.  All questions were answered, and the patient wishes to move forward with this medication.             Orders:  -     phentermine (ADIPEX-P) 37.5 MG tablet; Take 1/2 tablet by mouth in the morning.  Dispense: 14 tablet; Refill: 0    2. Anxiety and depression  Assessment & Plan:  Patient's depression is recurrent and is moderate without psychosis. Their depression is currently active and the condition is unchanged, grieving pregnancy loss of her son's baby. This will be reassessed at the next regular appointment. F/U as described:patient depression is being managed by their pcp.        Follow Up   Return in about 4 weeks (around 6/14/2022) for Next scheduled follow up.  Patient was given instructions and counseling regarding her condition or for health maintenance advice. Please see specific information pulled into the AVS if appropriate.     Bindu " LAURITA Lechuga   monitored anesthesia care (MAC)

## 2022-05-18 ENCOUNTER — INPATIENT (INPATIENT)
Facility: HOSPITAL | Age: 42
LOS: 0 days | Discharge: HOME | DRG: 419 | End: 2022-05-19
Attending: SPECIALIST | Admitting: SPECIALIST
Payer: COMMERCIAL

## 2022-05-18 ENCOUNTER — TRANSCRIPTION ENCOUNTER (OUTPATIENT)
Age: 42
End: 2022-05-18

## 2022-05-18 VITALS
WEIGHT: 205.03 LBS | HEART RATE: 79 BPM | TEMPERATURE: 98 F | OXYGEN SATURATION: 100 % | SYSTOLIC BLOOD PRESSURE: 147 MMHG | RESPIRATION RATE: 16 BRPM | HEIGHT: 68 IN | DIASTOLIC BLOOD PRESSURE: 75 MMHG

## 2022-05-18 DIAGNOSIS — Z98.890 OTHER SPECIFIED POSTPROCEDURAL STATES: Chronic | ICD-10-CM

## 2022-05-18 LAB
ALBUMIN SERPL ELPH-MCNC: 3.5 G/DL — SIGNIFICANT CHANGE UP (ref 3.3–5)
ALP SERPL-CCNC: 89 U/L — SIGNIFICANT CHANGE UP (ref 40–120)
ALT FLD-CCNC: 36 U/L — SIGNIFICANT CHANGE UP (ref 12–78)
ANION GAP SERPL CALC-SCNC: 7 MMOL/L — SIGNIFICANT CHANGE UP (ref 5–17)
AST SERPL-CCNC: 40 U/L — HIGH (ref 15–37)
BASOPHILS # BLD AUTO: 0.03 K/UL — SIGNIFICANT CHANGE UP (ref 0–0.2)
BASOPHILS NFR BLD AUTO: 0.3 % — SIGNIFICANT CHANGE UP (ref 0–2)
BILIRUB SERPL-MCNC: 0.5 MG/DL — SIGNIFICANT CHANGE UP (ref 0.2–1.2)
BUN SERPL-MCNC: 19 MG/DL — SIGNIFICANT CHANGE UP (ref 7–23)
CALCIUM SERPL-MCNC: 9.5 MG/DL — SIGNIFICANT CHANGE UP (ref 8.5–10.1)
CHLORIDE SERPL-SCNC: 106 MMOL/L — SIGNIFICANT CHANGE UP (ref 96–108)
CO2 SERPL-SCNC: 26 MMOL/L — SIGNIFICANT CHANGE UP (ref 22–31)
CREAT SERPL-MCNC: 0.68 MG/DL — SIGNIFICANT CHANGE UP (ref 0.5–1.3)
EGFR: 111 ML/MIN/1.73M2 — SIGNIFICANT CHANGE UP
EOSINOPHIL # BLD AUTO: 0.15 K/UL — SIGNIFICANT CHANGE UP (ref 0–0.5)
EOSINOPHIL NFR BLD AUTO: 1.4 % — SIGNIFICANT CHANGE UP (ref 0–6)
GLUCOSE SERPL-MCNC: 98 MG/DL — SIGNIFICANT CHANGE UP (ref 70–99)
HCG SERPL-ACNC: <1 MIU/ML — SIGNIFICANT CHANGE UP
HCT VFR BLD CALC: 42 % — SIGNIFICANT CHANGE UP (ref 34.5–45)
HGB BLD-MCNC: 14 G/DL — SIGNIFICANT CHANGE UP (ref 11.5–15.5)
IMM GRANULOCYTES NFR BLD AUTO: 0.6 % — SIGNIFICANT CHANGE UP (ref 0–1.5)
LIDOCAIN IGE QN: 171 U/L — SIGNIFICANT CHANGE UP (ref 73–393)
LYMPHOCYTES # BLD AUTO: 28.9 % — SIGNIFICANT CHANGE UP (ref 13–44)
LYMPHOCYTES # BLD AUTO: 3.2 K/UL — SIGNIFICANT CHANGE UP (ref 1–3.3)
MCHC RBC-ENTMCNC: 29.3 PG — SIGNIFICANT CHANGE UP (ref 27–34)
MCHC RBC-ENTMCNC: 33.3 GM/DL — SIGNIFICANT CHANGE UP (ref 32–36)
MCV RBC AUTO: 87.9 FL — SIGNIFICANT CHANGE UP (ref 80–100)
MONOCYTES # BLD AUTO: 0.73 K/UL — SIGNIFICANT CHANGE UP (ref 0–0.9)
MONOCYTES NFR BLD AUTO: 6.6 % — SIGNIFICANT CHANGE UP (ref 2–14)
NEUTROPHILS # BLD AUTO: 6.88 K/UL — SIGNIFICANT CHANGE UP (ref 1.8–7.4)
NEUTROPHILS NFR BLD AUTO: 62.2 % — SIGNIFICANT CHANGE UP (ref 43–77)
NRBC # BLD: 0 /100 WBCS — SIGNIFICANT CHANGE UP (ref 0–0)
PLATELET # BLD AUTO: 269 K/UL — SIGNIFICANT CHANGE UP (ref 150–400)
POTASSIUM SERPL-MCNC: 3.8 MMOL/L — SIGNIFICANT CHANGE UP (ref 3.5–5.3)
POTASSIUM SERPL-SCNC: 3.8 MMOL/L — SIGNIFICANT CHANGE UP (ref 3.5–5.3)
PROT SERPL-MCNC: 7.4 G/DL — SIGNIFICANT CHANGE UP (ref 6–8.3)
RBC # BLD: 4.78 M/UL — SIGNIFICANT CHANGE UP (ref 3.8–5.2)
RBC # FLD: 12.3 % — SIGNIFICANT CHANGE UP (ref 10.3–14.5)
SODIUM SERPL-SCNC: 139 MMOL/L — SIGNIFICANT CHANGE UP (ref 135–145)
TROPONIN I, HIGH SENSITIVITY RESULT: <3 NG/L — SIGNIFICANT CHANGE UP
WBC # BLD: 11.06 K/UL — HIGH (ref 3.8–10.5)
WBC # FLD AUTO: 11.06 K/UL — HIGH (ref 3.8–10.5)

## 2022-05-18 PROCEDURE — 99285 EMERGENCY DEPT VISIT HI MDM: CPT

## 2022-05-18 PROCEDURE — 93010 ELECTROCARDIOGRAM REPORT: CPT

## 2022-05-18 PROCEDURE — 76705 ECHO EXAM OF ABDOMEN: CPT | Mod: 26

## 2022-05-18 NOTE — ED PROVIDER NOTE - PHYSICAL EXAMINATION
Constitutional: Awake, Alert, non-toxic. NAD. Well appearing, well nourished.   HEAD: Normocephalic, atraumatic.   EYES: EOM intact, conjunctiva and sclera are clear bilaterally.   ENT: No rhinorrhea, patent, mucous membranes pink/moist, no drooling or stridor.   NECK: Supple, non-tender  CARDIOVASCULAR: Normal S1, S2; regular rate and rhythm.  RESPIRATORY: Normal respiratory effort; breath sounds CTAB, no wheezes, rhonchi, or rales. Speaking in full sentences. No accessory muscle use.   ABDOMEN: Soft; (+) RUQ TTP. no guarding or rebound TTP. no CVAT   EXTREMITIES: Full passive and active ROM in all extremities; non-tender to palpation; distal pulses palpable and symmetric  SKIN: Warm, dry; good skin turgor, no apparent lesions or rashes, no ecchymosis, brisk capillary refill.  NEURO: A&O x3. Sensory and motor functions are grossly intact. Speech is normal. Appearance and judgement seem appropriate for gender and age.

## 2022-05-18 NOTE — ED PROVIDER NOTE - OBJECTIVE STATEMENT
42 y/o female without reported PMHx presents today due to chest pain 1 hour PTA. pt reports acute onset at sternum, describes as burning, non-radiating, and currently resolved. Reports feeling nausea. Took Tums PTA. pt has followed with cardio in which has had Holter monitor, stress, test and echo. pt denies fever, vomiting, melena, SOB, hemoptysis, palpitations, syncope, recent travel/surgery, leg swelling, or any other complaints.

## 2022-05-18 NOTE — ED PROVIDER NOTE - CLINICAL SUMMARY MEDICAL DECISION MAKING FREE TEXT BOX
40 yo female c/o upper abdominal/chest pain, nonradiating, initially severe, now subsiding, took tums pta.  pt well appearing, abd +ttp ruq, cta b/l, rrr.  US suggestive of acute maddy, surg PA consulted, will admit to Dr Larson service

## 2022-05-19 ENCOUNTER — TRANSCRIPTION ENCOUNTER (OUTPATIENT)
Age: 42
End: 2022-05-19

## 2022-05-19 ENCOUNTER — RESULT REVIEW (OUTPATIENT)
Age: 42
End: 2022-05-19

## 2022-05-19 VITALS
HEART RATE: 62 BPM | SYSTOLIC BLOOD PRESSURE: 120 MMHG | TEMPERATURE: 98 F | RESPIRATION RATE: 18 BRPM | DIASTOLIC BLOOD PRESSURE: 69 MMHG | OXYGEN SATURATION: 94 %

## 2022-05-19 DIAGNOSIS — Z98.891 HISTORY OF UTERINE SCAR FROM PREVIOUS SURGERY: Chronic | ICD-10-CM

## 2022-05-19 DIAGNOSIS — K80.20 CALCULUS OF GALLBLADDER WITHOUT CHOLECYSTITIS WITHOUT OBSTRUCTION: ICD-10-CM

## 2022-05-19 DIAGNOSIS — K81.0 ACUTE CHOLECYSTITIS: ICD-10-CM

## 2022-05-19 LAB
ALBUMIN SERPL ELPH-MCNC: 3.1 G/DL — LOW (ref 3.3–5)
ALP SERPL-CCNC: 74 U/L — SIGNIFICANT CHANGE UP (ref 40–120)
ALT FLD-CCNC: 40 U/L — SIGNIFICANT CHANGE UP (ref 12–78)
ANION GAP SERPL CALC-SCNC: 8 MMOL/L — SIGNIFICANT CHANGE UP (ref 5–17)
APTT BLD: 30.4 SEC — SIGNIFICANT CHANGE UP (ref 27.5–35.5)
AST SERPL-CCNC: 32 U/L — SIGNIFICANT CHANGE UP (ref 15–37)
BASOPHILS # BLD AUTO: 0.03 K/UL — SIGNIFICANT CHANGE UP (ref 0–0.2)
BASOPHILS NFR BLD AUTO: 0.4 % — SIGNIFICANT CHANGE UP (ref 0–2)
BILIRUB SERPL-MCNC: 0.7 MG/DL — SIGNIFICANT CHANGE UP (ref 0.2–1.2)
BUN SERPL-MCNC: 14 MG/DL — SIGNIFICANT CHANGE UP (ref 7–23)
CALCIUM SERPL-MCNC: 8.9 MG/DL — SIGNIFICANT CHANGE UP (ref 8.5–10.1)
CHLORIDE SERPL-SCNC: 110 MMOL/L — HIGH (ref 96–108)
CO2 SERPL-SCNC: 24 MMOL/L — SIGNIFICANT CHANGE UP (ref 22–31)
CREAT SERPL-MCNC: 0.64 MG/DL — SIGNIFICANT CHANGE UP (ref 0.5–1.3)
EGFR: 113 ML/MIN/1.73M2 — SIGNIFICANT CHANGE UP
EOSINOPHIL # BLD AUTO: 0.15 K/UL — SIGNIFICANT CHANGE UP (ref 0–0.5)
EOSINOPHIL NFR BLD AUTO: 1.9 % — SIGNIFICANT CHANGE UP (ref 0–6)
GLUCOSE SERPL-MCNC: 96 MG/DL — SIGNIFICANT CHANGE UP (ref 70–99)
HCT VFR BLD CALC: 40.1 % — SIGNIFICANT CHANGE UP (ref 34.5–45)
HGB BLD-MCNC: 13.4 G/DL — SIGNIFICANT CHANGE UP (ref 11.5–15.5)
IMM GRANULOCYTES NFR BLD AUTO: 0.5 % — SIGNIFICANT CHANGE UP (ref 0–1.5)
INR BLD: 0.92 RATIO — SIGNIFICANT CHANGE UP (ref 0.88–1.16)
LYMPHOCYTES # BLD AUTO: 3.94 K/UL — HIGH (ref 1–3.3)
LYMPHOCYTES # BLD AUTO: 49.3 % — HIGH (ref 13–44)
MCHC RBC-ENTMCNC: 28.9 PG — SIGNIFICANT CHANGE UP (ref 27–34)
MCHC RBC-ENTMCNC: 33.4 GM/DL — SIGNIFICANT CHANGE UP (ref 32–36)
MCV RBC AUTO: 86.4 FL — SIGNIFICANT CHANGE UP (ref 80–100)
MONOCYTES # BLD AUTO: 0.59 K/UL — SIGNIFICANT CHANGE UP (ref 0–0.9)
MONOCYTES NFR BLD AUTO: 7.4 % — SIGNIFICANT CHANGE UP (ref 2–14)
NEUTROPHILS # BLD AUTO: 3.24 K/UL — SIGNIFICANT CHANGE UP (ref 1.8–7.4)
NEUTROPHILS NFR BLD AUTO: 40.5 % — LOW (ref 43–77)
NRBC # BLD: 0 /100 WBCS — SIGNIFICANT CHANGE UP (ref 0–0)
PLATELET # BLD AUTO: 243 K/UL — SIGNIFICANT CHANGE UP (ref 150–400)
POTASSIUM SERPL-MCNC: 3.5 MMOL/L — SIGNIFICANT CHANGE UP (ref 3.5–5.3)
POTASSIUM SERPL-SCNC: 3.5 MMOL/L — SIGNIFICANT CHANGE UP (ref 3.5–5.3)
PROT SERPL-MCNC: 6.7 G/DL — SIGNIFICANT CHANGE UP (ref 6–8.3)
PROTHROM AB SERPL-ACNC: 10.8 SEC — SIGNIFICANT CHANGE UP (ref 10.5–13.4)
RBC # BLD: 4.64 M/UL — SIGNIFICANT CHANGE UP (ref 3.8–5.2)
RBC # FLD: 12.2 % — SIGNIFICANT CHANGE UP (ref 10.3–14.5)
SARS-COV-2 RNA SPEC QL NAA+PROBE: SIGNIFICANT CHANGE UP
SODIUM SERPL-SCNC: 142 MMOL/L — SIGNIFICANT CHANGE UP (ref 135–145)
WBC # BLD: 7.99 K/UL — SIGNIFICANT CHANGE UP (ref 3.8–10.5)
WBC # FLD AUTO: 7.99 K/UL — SIGNIFICANT CHANGE UP (ref 3.8–10.5)

## 2022-05-19 PROCEDURE — C1889: CPT

## 2022-05-19 PROCEDURE — 85025 COMPLETE CBC W/AUTO DIFF WBC: CPT

## 2022-05-19 PROCEDURE — 86901 BLOOD TYPING SEROLOGIC RH(D): CPT

## 2022-05-19 PROCEDURE — 71045 X-RAY EXAM CHEST 1 VIEW: CPT | Mod: 26

## 2022-05-19 PROCEDURE — 47562 LAPAROSCOPIC CHOLECYSTECTOMY: CPT | Mod: 80

## 2022-05-19 PROCEDURE — 80053 COMPREHEN METABOLIC PANEL: CPT

## 2022-05-19 PROCEDURE — 83690 ASSAY OF LIPASE: CPT

## 2022-05-19 PROCEDURE — 85730 THROMBOPLASTIN TIME PARTIAL: CPT

## 2022-05-19 PROCEDURE — 84484 ASSAY OF TROPONIN QUANT: CPT

## 2022-05-19 PROCEDURE — 86850 RBC ANTIBODY SCREEN: CPT

## 2022-05-19 PROCEDURE — 93005 ELECTROCARDIOGRAM TRACING: CPT

## 2022-05-19 PROCEDURE — 47562 LAPAROSCOPIC CHOLECYSTECTOMY: CPT

## 2022-05-19 PROCEDURE — 88304 TISSUE EXAM BY PATHOLOGIST: CPT

## 2022-05-19 PROCEDURE — 99221 1ST HOSP IP/OBS SF/LOW 40: CPT | Mod: 25

## 2022-05-19 PROCEDURE — 88304 TISSUE EXAM BY PATHOLOGIST: CPT | Mod: 26

## 2022-05-19 PROCEDURE — 99285 EMERGENCY DEPT VISIT HI MDM: CPT | Mod: 25

## 2022-05-19 PROCEDURE — 85610 PROTHROMBIN TIME: CPT

## 2022-05-19 PROCEDURE — 87635 SARS-COV-2 COVID-19 AMP PRB: CPT

## 2022-05-19 PROCEDURE — 84702 CHORIONIC GONADOTROPIN TEST: CPT

## 2022-05-19 PROCEDURE — 71045 X-RAY EXAM CHEST 1 VIEW: CPT

## 2022-05-19 PROCEDURE — 86900 BLOOD TYPING SEROLOGIC ABO: CPT

## 2022-05-19 PROCEDURE — 76705 ECHO EXAM OF ABDOMEN: CPT

## 2022-05-19 PROCEDURE — 36415 COLL VENOUS BLD VENIPUNCTURE: CPT

## 2022-05-19 DEVICE — CLIP APPLIER COVIDIEN ENDOCLIP II 10MM MED/LG: Type: IMPLANTABLE DEVICE | Status: FUNCTIONAL

## 2022-05-19 RX ORDER — ONDANSETRON 8 MG/1
4 TABLET, FILM COATED ORAL EVERY 6 HOURS
Refills: 0 | Status: DISCONTINUED | OUTPATIENT
Start: 2022-05-19 | End: 2022-05-19

## 2022-05-19 RX ORDER — HYDROMORPHONE HYDROCHLORIDE 2 MG/ML
0.5 INJECTION INTRAMUSCULAR; INTRAVENOUS; SUBCUTANEOUS
Refills: 0 | Status: DISCONTINUED | OUTPATIENT
Start: 2022-05-19 | End: 2022-05-19

## 2022-05-19 RX ORDER — PIPERACILLIN AND TAZOBACTAM 4; .5 G/20ML; G/20ML
3.38 INJECTION, POWDER, LYOPHILIZED, FOR SOLUTION INTRAVENOUS EVERY 8 HOURS
Refills: 0 | Status: DISCONTINUED | OUTPATIENT
Start: 2022-05-19 | End: 2022-05-19

## 2022-05-19 RX ORDER — PIPERACILLIN AND TAZOBACTAM 4; .5 G/20ML; G/20ML
3.38 INJECTION, POWDER, LYOPHILIZED, FOR SOLUTION INTRAVENOUS ONCE
Refills: 0 | Status: COMPLETED | OUTPATIENT
Start: 2022-05-19 | End: 2022-05-19

## 2022-05-19 RX ORDER — OXYCODONE HYDROCHLORIDE 5 MG/1
1 TABLET ORAL
Qty: 7 | Refills: 0
Start: 2022-05-19

## 2022-05-19 RX ORDER — HYDROMORPHONE HYDROCHLORIDE 2 MG/ML
0.5 INJECTION INTRAMUSCULAR; INTRAVENOUS; SUBCUTANEOUS EVERY 4 HOURS
Refills: 0 | Status: DISCONTINUED | OUTPATIENT
Start: 2022-05-19 | End: 2022-05-19

## 2022-05-19 RX ORDER — SODIUM CHLORIDE 9 MG/ML
1000 INJECTION INTRAMUSCULAR; INTRAVENOUS; SUBCUTANEOUS
Refills: 0 | Status: DISCONTINUED | OUTPATIENT
Start: 2022-05-19 | End: 2022-05-19

## 2022-05-19 RX ORDER — IBUPROFEN 200 MG
600 TABLET ORAL EVERY 6 HOURS
Refills: 0 | Status: DISCONTINUED | OUTPATIENT
Start: 2022-05-19 | End: 2022-05-19

## 2022-05-19 RX ORDER — ACETAMINOPHEN 500 MG
1000 TABLET ORAL EVERY 6 HOURS
Refills: 0 | Status: DISCONTINUED | OUTPATIENT
Start: 2022-05-20 | End: 2022-05-19

## 2022-05-19 RX ORDER — ACETAMINOPHEN 500 MG
1000 TABLET ORAL ONCE
Refills: 0 | Status: COMPLETED | OUTPATIENT
Start: 2022-05-19 | End: 2022-05-19

## 2022-05-19 RX ORDER — VENLAFAXINE HCL 75 MG
37.5 CAPSULE, EXT RELEASE 24 HR ORAL DAILY
Refills: 0 | Status: DISCONTINUED | OUTPATIENT
Start: 2022-05-19 | End: 2022-05-19

## 2022-05-19 RX ORDER — PANTOPRAZOLE SODIUM 20 MG/1
40 TABLET, DELAYED RELEASE ORAL
Refills: 0 | Status: DISCONTINUED | OUTPATIENT
Start: 2022-05-19 | End: 2022-05-19

## 2022-05-19 RX ORDER — ONDANSETRON 8 MG/1
4 TABLET, FILM COATED ORAL ONCE
Refills: 0 | Status: DISCONTINUED | OUTPATIENT
Start: 2022-05-19 | End: 2022-05-19

## 2022-05-19 RX ORDER — SODIUM CHLORIDE 9 MG/ML
1000 INJECTION, SOLUTION INTRAVENOUS
Refills: 0 | Status: DISCONTINUED | OUTPATIENT
Start: 2022-05-19 | End: 2022-05-19

## 2022-05-19 RX ORDER — OXYCODONE HYDROCHLORIDE 5 MG/1
5 TABLET ORAL EVERY 6 HOURS
Refills: 0 | Status: DISCONTINUED | OUTPATIENT
Start: 2022-05-19 | End: 2022-05-19

## 2022-05-19 RX ORDER — HYDROMORPHONE HYDROCHLORIDE 2 MG/ML
1 INJECTION INTRAMUSCULAR; INTRAVENOUS; SUBCUTANEOUS EVERY 6 HOURS
Refills: 0 | Status: DISCONTINUED | OUTPATIENT
Start: 2022-05-19 | End: 2022-05-19

## 2022-05-19 RX ADMIN — Medication 400 MILLIGRAM(S): at 02:16

## 2022-05-19 RX ADMIN — PIPERACILLIN AND TAZOBACTAM 200 GRAM(S): 4; .5 INJECTION, POWDER, LYOPHILIZED, FOR SOLUTION INTRAVENOUS at 03:53

## 2022-05-19 RX ADMIN — Medication 1000 MILLIGRAM(S): at 17:45

## 2022-05-19 RX ADMIN — Medication 400 MILLIGRAM(S): at 17:15

## 2022-05-19 RX ADMIN — Medication 400 MILLIGRAM(S): at 08:25

## 2022-05-19 RX ADMIN — SODIUM CHLORIDE 125 MILLILITER(S): 9 INJECTION INTRAMUSCULAR; INTRAVENOUS; SUBCUTANEOUS at 05:22

## 2022-05-19 RX ADMIN — Medication 37.5 MILLIGRAM(S): at 12:22

## 2022-05-19 RX ADMIN — Medication 1000 MILLIGRAM(S): at 02:31

## 2022-05-19 RX ADMIN — PIPERACILLIN AND TAZOBACTAM 25 GRAM(S): 4; .5 INJECTION, POWDER, LYOPHILIZED, FOR SOLUTION INTRAVENOUS at 12:22

## 2022-05-19 RX ADMIN — Medication 600 MILLIGRAM(S): at 19:03

## 2022-05-19 RX ADMIN — SODIUM CHLORIDE 75 MILLILITER(S): 9 INJECTION, SOLUTION INTRAVENOUS at 16:45

## 2022-05-19 NOTE — H&P ADULT - NSHPLABSRESULTS_GEN_ALL_CORE
Vital Signs Last 24 Hrs  T(C): 36.4 (18 May 2022 21:24), Max: 36.4 (18 May 2022 21:24)  T(F): 97.5 (18 May 2022 21:24), Max: 97.5 (18 May 2022 21:24)  HR: 79 (18 May 2022 21:24) (79 - 79)  BP: 147/75 (18 May 2022 21:24) (147/75 - 147/75)  BP(mean): --  RR: 16 (18 May 2022 21:24) (16 - 16)  SpO2: 100% (18 May 2022 21:24) (100% - 100%)                          14.0   11.06 )-----------( 269      ( 18 May 2022 23:24 )             42.0     05-18    139  |  106  |  19  ----------------------------<  98  3.8   |  26  |  0.68    Ca    9.5      18 May 2022 23:24    TPro  7.4  /  Alb  3.5  /  TBili  0.5  /  DBili  x   /  AST  40<H>  /  ALT  36  /  AlkPhos  89  05-18    < from: US Abdomen Upper Quadrant Right (05.18.22 @ 23:45) >    FINDINGS:  Liver: Within normal limits.  Bile ducts: Normal caliber. Common bile duct measures 3 mm.  Gallbladder: Cholelithiasis. No wall thickening. Trace pericholecystic   fluid. A positive sonographic Watson's sign was elicited.  Pancreas: Visualized portions are within normal limits.  Right kidney: 9.2 cm. No hydronephrosis.  Ascites: None.  Aorta/IVC: Visualized portions are within normal limits.    IMPRESSION:  Cholelithiasis without gallbladder wall thickening, but with trace   pericholecystic fluid and a positive sonographic Watson's sign raising   the possibility of an acute cholecystitis. Correlate with laboratory   values, physical examination and if clinically warranted, a nuclear HIDA   scan.

## 2022-05-19 NOTE — H&P ADULT - ASSESSMENT
40 YO Female w/ PMHx of  c/b post-partum depression () p/w epigastric pain & nausea x 6 hours, RUQ U/S w/ evidence of cholelithiasis w/ trace pericholecystic fluid. + mild leukocytosis, pt is afebrile. 42 YO Female w/ PMHx of  c/b post-partum depression () p/w epigastric pain & nausea x 6 hours, RUQ U/S w/ evidence of cholelithiasis w/ trace pericholecystic fluid. + mild leukocytosis, pt is afebrile.  Surg Att.   CAse discussed with surgical team. History reviewed as well as labs and imaging studies. Will further discuss with the patient.

## 2022-05-19 NOTE — DISCHARGE NOTE NURSING/CASE MANAGEMENT/SOCIAL WORK - NSDCPEFALRISK_GEN_ALL_CORE
For information on Fall & Injury Prevention, visit: [email or web address] OR  [email or web address] OR  https://www.cdc.gov/steadi/patient.html

## 2022-05-19 NOTE — DISCHARGE NOTE NURSING/CASE MANAGEMENT/SOCIAL WORK - PATIENT PORTAL LINK FT
You can access the FollowMyHealth Patient Portal offered by [facility] by registering at the following website: [email or web address]. By joining [facility]’s FollowMyHealth portal, you will also be able to view your health information using other applications (apps) compatible with our system.

## 2022-05-19 NOTE — ED ADULT NURSE NOTE - CHPI ED NUR SYMPTOMS NEG
no abdominal distension/no blood in stool/no burning urination/no diarrhea/no dysuria/no fever/no hematuria/no vomiting

## 2022-05-19 NOTE — DISCHARGE NOTE PROVIDER - NSDCFUADDINST_GEN_ALL_CORE_FT
Keep incisions clean, dry and intact x 24 hrs. Apply water proof ice pack 20 mins on, 20 mins off to help decrease pain and swelling. After 24 hrs, you may begin showering as usual, do NOT pick at wound glue. DO NOT scrub or soak incision sites. Pat dry. NO tub baths. NO swimming pools. NO hot tubs. NO exercise, gym, sports, or heavy lifting >10lbs until otherwise advised by Dr. Larson. Take Oxycodone as prescribed for severe pain. You may take over the counter Tylenol or Motrin as needed for mild to moderate pain.

## 2022-05-19 NOTE — PRE-OP CHECKLIST - ORDERS/MEDICATION ADMINISTRATION RECORD ON CHART
Monitor bowel movements while on oxybutynin.    Take oxybutynin twice a day    Keep journal of frequency and incontinence while taking oxybutynin to see improvement.     If no relief will order bladder study next visit  
done

## 2022-05-19 NOTE — PATIENT PROFILE ADULT - NSPROGENBLOODRESTRICT_GEN_A_NUR
Interval History: Patient had vascular procedure done yesterday and still with pain in right heel today. No acute problems over night. New culture data from right heel wound surgical cultures from 10/10 - right calcaneous positive for e coli, morganella, proteus, gram negative dom and staph aureus - sensitivities on some of the organisms still pending. .     Review of Systems   Respiratory: Negative for shortness of breath.    Gastrointestinal: Negative for diarrhea, nausea and vomiting.   Musculoskeletal:        Right foot pain     Antibiotics (From admission, onward)    Start     Stop Route Frequency Ordered    10/11/19 1700  vancomycin 750 mg in dextrose 5 % 250 mL IVPB (ready to mix system)      -- IV Every Mon, Wed, Fri 10/11/19 0831    10/09/19 0915  mupirocin 2 % ointment      10/14 0859 Nasl 2 times daily 10/09/19 0814    10/08/19 1353  piperacillin-tazobactam 4.5 g in dextrose 5 % 100 mL IVPB (ready to mix system)      -- IV Every 12 hours (non-standard times) 10/08/19 1354        Objective:     Vital Signs (Most Recent):  Temp: 99.4 °F (37.4 °C) (10/12/19 1132)  Pulse: 91 (10/12/19 1045)  Resp: 19 (10/12/19 1045)  BP: 111/80 (10/12/19 1045)  SpO2: 96 % (10/12/19 1045) Vital Signs (24h Range):  Temp:  [94 °F (34.4 °C)-99.4 °F (37.4 °C)] 99.4 °F (37.4 °C)  Pulse:  [68-94] 91  Resp:  [12-37] 19  SpO2:  [83 %-100 %] 96 %  BP: ()/(47-80) 111/80     Weight: 112.9 kg (248 lb 14.4 oz)  Body mass index is 34.71 kg/m².    Estimated Creatinine Clearance: 19.4 mL/min (A) (based on SCr of 4.8 mg/dL (H)).    Physical Exam   Cardiovascular: Normal heart sounds.   Pulmonary/Chest: Breath sounds normal. No respiratory distress.   Abdominal: Soft. Bowel sounds are normal.   obese   Musculoskeletal: She exhibits no edema.   Right heel bandaged; right foot with ischemic toes; laft AKA stump healed well   Neurological: She is alert.       Significant Labs:   CBC:   Recent Labs   Lab 10/11/19  0553 10/12/19  0913   WBC  none 10.94 11.52   HGB 7.5* 7.4*   HCT 24.6* 25.6*   * 370*     CMP:   Recent Labs   Lab 10/11/19  0553 10/12/19  0455   * 133*   K 3.6 3.8   CL 92* 99   CO2 31* 23   GLU 67* 81   BUN 19 13   CREATININE 5.8* 4.8*   CALCIUM 7.8* 8.0*   ALBUMIN 1.7* 2.0*   ANIONGAP 10 11   EGFRNONAA 8* 10*     Wound Culture:   Recent Labs   Lab 10/08/19  0641 10/10/19  1053   LABAERO ESCHERICHIA COLI  Moderate  *  PROVIDENCIA STUARTII  Moderate  *  PROTEUS MIRABILIS  Moderate  *  METHICILLIN RESISTANT STAPHYLOCOCCUS AUREUS  Moderate  No other significant isolate  * ESCHERICHIA COLI  Moderate  *  MORGANELLA MORGANII  Moderate  *  PRESUMPTIVE PROTEUS SPECIES  Moderate  Identification and susceptibility pending  *  ESCHERICHIA COLI  Moderate  *  PROTEUS VULGARIS  Moderate  *  GRAM NEGATIVE RADHA  Few  Identification and susceptibility pending  *  STAPHYLOCOCCUS AUREUS  Moderate  Susceptibility pending  *       Significant Imaging:   10/9 x ray abdomen -   1. Scattered slightly prominent loops of small bowel, similar when compared to the recent CTA.

## 2022-05-19 NOTE — DISCHARGE NOTE PROVIDER - HOSPITAL COURSE
42 YO Female w/ PMHx of  c/b post-partum depression () p/w epigastric pain x 6 hours. Patient states that at about 4pm she ate pizza and around 4 hours later began experiencing severe epigastric pain with radiation to the flank. Pt was originally concerned that the pain was caused by heartburn, so she took Tums with no relief in symptoms. Pain is associated with nausea w/o vomiting. Denies fever, chills, chest pain, vomiting, diarrhea, constipation, hematochezia, hematemesis or history of episodes of similar pain. Of note, pt has been undergoing outpatient cardiac work-up including stress test & holter monitor due to FHx of MI in her mother. Pt states that she does not believe there have been any abnormal findings in her work-up.     Hospital course:    Patient underwent successful lap maddy without complications, was sent to PACU then to the floor for discharge      Diet was tolerated    Disposition: Patient to follow-up with Dr. Larson as outpatient in 1 week.

## 2022-05-19 NOTE — DISCHARGE NOTE PROVIDER - NSDCMRMEDTOKEN_GEN_ALL_CORE_FT
acetaminophen 325 mg oral tablet: 3 tab(s) orally every 6 hours, As needed, Mild Pain (1 - 3)  ibuprofen 600 mg oral tablet: 1 tab(s) orally every 6 hours, As needed, Mild Pain (1 - 3)  Lovenox 40 mg/0.4 mL injectable solution: 40 milligram(s) subcutaneously once a day   oxyCODONE 5 mg oral tablet: 1-2 tab(s) orally every 4-6 hours, As Needed -for severe pain MDD:6  Prenatal Multivitamins with Folic Acid 1 mg oral tablet: 1 tab(s) orally once a day

## 2022-05-19 NOTE — ED ADULT NURSE NOTE - NSIMPLEMENTINTERV_GEN_ALL_ED
Implemented All Universal Safety Interventions:  [place] to call system. Call bell, personal items and telephone within reach. Instruct patient to call for assistance. Room bathroom lighting operational. Non-slip footwear when patient is off stretcher. Physically safe environment: no spills, clutter or unnecessary equipment. Stretcher in lowest position, wheels locked, appropriate side rails in place.

## 2022-05-19 NOTE — PATIENT PROFILE ADULT - FALL HARM RISK - UNIVERSAL INTERVENTIONS
Bed in lowest position, wheels locked, appropriate side rails in place/Call bell, personal items and telephone in reach/Instruct patient to call for assistance before getting out of bed or chair/Non-slip footwear when patient is out of bed/[place] to call system/Physically safe environment - no spills, clutter or unnecessary equipment/Purposeful Proactive Rounding/Room/bathroom lighting operational, light cord in reach

## 2022-05-19 NOTE — DISCHARGE NOTE PROVIDER - CARE PROVIDER_API CALL
Michi Larson)  Surgery  [address] [place] [address]  Phone: [phone]  Fax: [phone]  Follow Up Time:

## 2022-05-19 NOTE — H&P ADULT - HISTORY OF PRESENT ILLNESS
42 YO Female w/ PMHx of  c/b post-partum depression () p/w epigastric pain x 6 hours. Patient states that at about 4pm she ate pizza and around 4 hours later began experiencing severe epigastric pain with radiation to the flank. Pt was originally concerned that the pain was caused by heartburn, so she took Tums with no relief in symptoms. Pain is associated with nausea w/o vomiting. Denies fever, chills, chest pain, vomiting, diarrhea, constipation, hematochezia, hematemesis or history of episodes of similar pain. Of note, pt has been undergoing outpatient cardiac work-up including stress test due to FHx of MI in her mother. Pt states that she does not believe there have been any abnormal findings in her work-up.  40 YO Female w/ PMHx of  c/b post-partum depression () p/w epigastric pain x 6 hours. Patient states that at about 4pm she ate pizza and around 4 hours later began experiencing severe epigastric pain with radiation to the flank. Pt was originally concerned that the pain was caused by heartburn, so she took Tums with no relief in symptoms. Pain is associated with nausea w/o vomiting. Denies fever, chills, chest pain, vomiting, diarrhea, constipation, hematochezia, hematemesis or history of episodes of similar pain. Of note, pt has been undergoing outpatient cardiac work-up including stress test & holter monitor due to FHx of MI in her mother. Pt states that she does not believe there have been any abnormal findings in her work-up.

## 2022-05-19 NOTE — DISCHARGE NOTE PROVIDER - NSDCFUSCHEDAPPT_GEN_ALL_CORE_FT
Celina Wilson  [facility]  OB/ [facility]  Scheduled Appointment: 06/03/2022

## 2022-05-19 NOTE — H&P ADULT - PROBLEM SELECTOR PLAN 1
- Keep NPO w/ IVF in case of OR  - Pre-op labs ordered  - IV Zosyn  - IV Zofran PRN N/V  - DVT ppx: scds  - Pain control, supportive care, OOB, incentive spirometer

## 2022-05-19 NOTE — H&P ADULT - NSHPPHYSICALEXAM_GEN_ALL_CORE
PHYSICAL EXAM:  GENERAL: NAD, lying in bed comfortably  HEAD:  Atraumatic, Normocephalic  EYES: EOMI, PERRLA, conjunctiva and sclera clear  ABDOMEN: Soft, protuberant, +TTP in epigastrium and RUQ  NERVOUS SYSTEM:  Alert & Oriented X3, speech clear. No deficits

## 2022-05-26 ENCOUNTER — APPOINTMENT (OUTPATIENT)
Dept: SURGERY | Facility: CLINIC | Age: 42
End: 2022-05-26
Payer: COMMERCIAL

## 2022-05-26 VITALS
SYSTOLIC BLOOD PRESSURE: 110 MMHG | WEIGHT: 205 LBS | BODY MASS INDEX: 30.36 KG/M2 | DIASTOLIC BLOOD PRESSURE: 75 MMHG | HEART RATE: 89 BPM | HEIGHT: 69 IN | OXYGEN SATURATION: 98 %

## 2022-05-26 PROCEDURE — 99024 POSTOP FOLLOW-UP VISIT: CPT

## 2022-06-03 ENCOUNTER — RX RENEWAL (OUTPATIENT)
Age: 42
End: 2022-06-03

## 2022-06-03 ENCOUNTER — APPOINTMENT (OUTPATIENT)
Dept: OBGYN | Facility: CLINIC | Age: 42
End: 2022-06-03
Payer: COMMERCIAL

## 2022-06-03 VITALS
DIASTOLIC BLOOD PRESSURE: 80 MMHG | WEIGHT: 227 LBS | HEIGHT: 69 IN | SYSTOLIC BLOOD PRESSURE: 147 MMHG | BODY MASS INDEX: 33.62 KG/M2

## 2022-06-03 DIAGNOSIS — Z30.431 ENCOUNTER FOR ROUTINE CHECKING OF INTRAUTERINE CONTRACEPTIVE DEVICE: ICD-10-CM

## 2022-06-03 DIAGNOSIS — Z01.419 ENCOUNTER FOR GYNECOLOGICAL EXAMINATION (GENERAL) (ROUTINE) W/OUT ABNORMAL FINDINGS: ICD-10-CM

## 2022-06-03 PROCEDURE — 99396 PREV VISIT EST AGE 40-64: CPT

## 2022-06-03 NOTE — PHYSICAL EXAM
[Chaperone Declined] : Patient declined chaperone [Appropriately responsive] : appropriately responsive [Alert] : alert [No Acute Distress] : no acute distress [No Lymphadenopathy] : no lymphadenopathy [Soft] : soft [Non-tender] : non-tender [Non-distended] : non-distended [No HSM] : No HSM [No Lesions] : no lesions [No Mass] : no mass [Oriented x3] : oriented x3 [Examination Of The Breasts] : a normal appearance [No Masses] : no breast masses were palpable [Labia Majora] : normal [Labia Minora] : normal [IUD String] : an IUD string was noted [Normal] : normal [Uterine Adnexae] : normal

## 2022-06-03 NOTE — HISTORY OF PRESENT ILLNESS
[FreeTextEntry1] : no issues \par Recent cholecystectomy  [Patient reported mammogram was normal] : Patient reported mammogram was normal [Patient reported PAP Smear was normal] : Patient reported PAP Smear was normal [Mammogramdate] : 2021 [PapSmeardate] : 2021

## 2022-06-07 LAB — HPV HIGH+LOW RISK DNA PNL CVX: NOT DETECTED

## 2022-06-08 LAB — CYTOLOGY CVX/VAG DOC THIN PREP: NORMAL

## 2022-06-10 ENCOUNTER — APPOINTMENT (OUTPATIENT)
Dept: FAMILY MEDICINE | Facility: CLINIC | Age: 42
End: 2022-06-10
Payer: COMMERCIAL

## 2022-06-10 VITALS
HEIGHT: 69 IN | RESPIRATION RATE: 14 BRPM | TEMPERATURE: 98 F | SYSTOLIC BLOOD PRESSURE: 144 MMHG | BODY MASS INDEX: 33.92 KG/M2 | OXYGEN SATURATION: 99 % | HEART RATE: 87 BPM | WEIGHT: 229 LBS | DIASTOLIC BLOOD PRESSURE: 80 MMHG

## 2022-06-10 DIAGNOSIS — Z82.49 FAMILY HISTORY OF ISCHEMIC HEART DISEASE AND OTHER DISEASES OF THE CIRCULATORY SYSTEM: ICD-10-CM

## 2022-06-10 DIAGNOSIS — E53.8 DEFICIENCY OF OTHER SPECIFIED B GROUP VITAMINS: ICD-10-CM

## 2022-06-10 DIAGNOSIS — R89.9 UNSPECIFIED ABNORMAL FINDING IN SPECIMENS FROM OTHER ORGANS, SYSTEMS AND TISSUES: ICD-10-CM

## 2022-06-10 PROCEDURE — 99214 OFFICE O/P EST MOD 30 MIN: CPT

## 2022-06-10 RX ORDER — METFORMIN HYDROCHLORIDE 500 MG/1
500 TABLET, COATED ORAL DAILY
Qty: 30 | Refills: 0 | Status: DISCONTINUED | COMMUNITY
Start: 2021-10-29 | End: 2022-06-10

## 2022-06-10 RX ORDER — SEMAGLUTIDE 1.34 MG/ML
2 INJECTION, SOLUTION SUBCUTANEOUS
Qty: 3 | Refills: 1 | Status: DISCONTINUED | COMMUNITY
Start: 2021-10-29 | End: 2022-06-10

## 2022-06-10 RX ORDER — ESCITALOPRAM OXALATE 10 MG/1
10 TABLET ORAL
Qty: 90 | Refills: 0 | Status: DISCONTINUED | COMMUNITY
Start: 2021-09-24 | End: 2022-06-10

## 2022-06-10 NOTE — ASSESSMENT
[FreeTextEntry1] : Patient is a 41 year old female with PMH obesity, depression who presents for followup. \par \par Reviewed outside bloodwork. Grossy normal; EBV IgG antibody positive, which indicates past infection), she has mildly elevated triglycerides and mildly low vitamin D level. Her total T4 level is mildly elevated, but the rest of her thyroid panel is within normal limits. Vitamin B12 level is mildly low. Advised patient that she may start taking vitamin supplementation. \par \par Depression: \par continue Effexor\par -Pt to continue to see psychiatry regarding ADHD\par - Likely to start medication  \par \par Obesity: \par Diet and lifestyle modifications encouraged \par \par \par Patient will return in a few months and get repeat labs.

## 2022-06-10 NOTE — PHYSICAL EXAM
[No Acute Distress] : no acute distress [Well Nourished] : well nourished [Well Developed] : well developed [Well-Appearing] : well-appearing [Normal Sclera/Conjunctiva] : normal sclera/conjunctiva [PERRL] : pupils equal round and reactive to light [EOMI] : extraocular movements intact [Normal Outer Ear/Nose] : the outer ears and nose were normal in appearance [Normal Oropharynx] : the oropharynx was normal [No Lymphadenopathy] : no lymphadenopathy [Supple] : supple [Thyroid Normal, No Nodules] : the thyroid was normal and there were no nodules present [No Respiratory Distress] : no respiratory distress  [No Accessory Muscle Use] : no accessory muscle use [Clear to Auscultation] : lungs were clear to auscultation bilaterally [Normal Rate] : normal rate  [Regular Rhythm] : with a regular rhythm [Normal S1, S2] : normal S1 and S2 [No Murmur] : no murmur heard [No Edema] : there was no peripheral edema [Soft] : abdomen soft [Non Tender] : non-tender [Non-distended] : non-distended [Normal Bowel Sounds] : normal bowel sounds [Normal Posterior Cervical Nodes] : no posterior cervical lymphadenopathy [Normal Anterior Cervical Nodes] : no anterior cervical lymphadenopathy [No CVA Tenderness] : no CVA  tenderness [No Spinal Tenderness] : no spinal tenderness [Grossly Normal Strength/Tone] : grossly normal strength/tone [No Focal Deficits] : no focal deficits [Normal Gait] : normal gait [Normal Affect] : the affect was normal [Normal Insight/Judgement] : insight and judgment were intact

## 2022-06-10 NOTE — HISTORY OF PRESENT ILLNESS
[FreeTextEntry1] : followup [de-identified] : Patient is a 41 year old female with PMH obesity, depression who presents for followup. Patient states that she recently started seeing a psychiatrist for ADHD like symptoms and was told to stop Lexapro and was started on Effexor. She was referred to neurology by her psychiatrist and had bloodwork done that was mostly normal (she had EBV IGG). She went to a cardiologist recently due to her strong cardiac family history and had bloodwork done which she would like to review. She had an EKG and echo done, and will be going for a stress test soon. \par \par Of note, she recently had an emergency cholecystectomy on May 19th at [facility]. She is trying to focus on diet and exercise.

## 2022-06-10 NOTE — HEALTH RISK ASSESSMENT
[Intercurrent hospitalizations] : was admitted to the hospital  [Former] : Former [No] : No [0] : 2) Feeling down, depressed, or hopeless: Not at all (0) [YearQuit] : 2007 [JHB1Orxbr] : 0

## 2022-07-07 ENCOUNTER — APPOINTMENT (OUTPATIENT)
Dept: ENDOCRINOLOGY | Facility: CLINIC | Age: 42
End: 2022-07-07

## 2022-07-07 PROCEDURE — 99204 OFFICE O/P NEW MOD 45 MIN: CPT

## 2022-07-10 NOTE — CONSULT LETTER
[Dear  ___] : Dear  [unfilled], [Consult Letter:] : I had the pleasure of evaluating your patient, [unfilled]. [Please see my note below.] : Please see my note below. [Sincerely,] : Sincerely, [Consult Closing:] : Thank you very much for allowing me to participate in the care of this patient.  If you have any questions, please do not hesitate to contact me. [FreeTextEntry3] : Jyothi Lewis MS. DO.\par Endocrinology, Diabetes and Metabolism\par [address] [place] [address]\[place] [address]\par Tel [phone]\par Fax [phone]\par

## 2022-07-10 NOTE — PHYSICAL EXAM
[Alert] : alert [Well Nourished] : well nourished [No Acute Distress] : no acute distress [Well Developed] : well developed [Normal Sclera/Conjunctiva] : normal sclera/conjunctiva [EOMI] : extra ocular movement intact [No Proptosis] : no proptosis [Normal Oropharynx] : the oropharynx was normal [No Thyroid Nodules] : no palpable thyroid nodules [Thyroid Not Enlarged] : the thyroid was not enlarged [No Respiratory Distress] : no respiratory distress [No Accessory Muscle Use] : no accessory muscle use [Clear to Auscultation] : lungs were clear to auscultation bilaterally [Normal S1, S2] : normal S1 and S2 [Normal Rate] : heart rate was normal [Regular Rhythm] : with a regular rhythm [No Edema] : no peripheral edema [Pedal Pulses Normal] : the pedal pulses are present [Normal Bowel Sounds] : normal bowel sounds [Not Tender] : non-tender [Not Distended] : not distended [Normal Anterior Cervical Nodes] : no anterior cervical lymphadenopathy [Soft] : abdomen soft [Normal Posterior Cervical Nodes] : no posterior cervical lymphadenopathy [No Spinal Tenderness] : no spinal tenderness [Spine Straight] : spine straight [No Stigmata of Cushings Syndrome] : no stigmata of Cushings Syndrome [Normal Gait] : normal gait [Normal Strength/Tone] : muscle strength and tone were normal [No Rash] : no rash [Acanthosis Nigricans] : no acanthosis nigricans [Normal Reflexes] : deep tendon reflexes were 2+ and symmetric [No Tremors] : no tremors [Oriented x3] : oriented to person, place, and time

## 2022-07-10 NOTE — HISTORY OF PRESENT ILLNESS
[FreeTextEntry1] : Ms. PÉREZ WEATHERS is a 41 year old female with a past medical history of Anxiety and Depression, Obesity presents for evaluation for difficulty in losing weight. Patient was not heavy as a child. The bulk of her weight gain started in her adult life. She was smoking and quit which led to weight gain. Her father is obese and mother does not eat healthy. She denies any easy bruising, extremity weakness, flushing, new striae. Patient had 2 children. She was able to lose weight with HMR diet but when she stopped it again and gained back weight. She tried another program called Calibrate but has not been able to lose enough weight. She was started on Metformin and Ozempic. She was also started on Wegovy but she did not started. Patient was rowing and [place] but then stopped everything when she got busy with kids. \par

## 2022-07-10 NOTE — ASSESSMENT
[FreeTextEntry1] : 41 year old female with a past medical history of Anxiety and Depression, Obesity presents for evaluation for difficulty in losing weight\par \par 1. Obesity\par Discussed the need to be consistent with diet and exercise plan\par Patient has success with weight loss but then gains\par Patient has to follow lifelong plan\par Suggested Mediterranean diet, intermittent fasting, low carb as examples\par Recommended minimal 30 mins of moderate intensity exercise 5 times per week\par Further, we discussed medications for weight loss, their mechanisms, precautions and side effects. Orlistat (Xenical, Brian) works by blocking intestinal fat absorption. It can impair absorption of fat soluble vitamins. Side effects include oily and loose stools and flatulence. Metformin increases insulin sensitivity and reduces gluconeogenesis in the liver. Common side effects include nausea, gastric upset, diarrhea. GLP-1 agonists, phentermine/topiramate (Qsymia) and bupropion/naltrexone (Contrave) suppress appetite. GLP-1 agonists have possible side effects of nausea, diarrhea, early satiety. Phentermine is a a sympathomimetic and can cause HTN, rapid heart rate and arrhythmias. Topiramate can cause glaucoma, kidney stones and is a teratogen. Bupropion lowers the seizure threshold. Contrave can cause nausea, headache and constipation. \par Patient has ozempic and wegovy\par I recommended to restart Ozempic and then start wegovy 1 mg weekly\par She is to call if she needs more refills\par \par Follow up in 3 months\par

## 2022-08-01 ENCOUNTER — APPOINTMENT (OUTPATIENT)
Dept: ULTRASOUND IMAGING | Facility: CLINIC | Age: 42
End: 2022-08-01

## 2022-08-01 ENCOUNTER — OUTPATIENT (OUTPATIENT)
Dept: OUTPATIENT SERVICES | Facility: HOSPITAL | Age: 42
LOS: 1 days | End: 2022-08-01
Payer: COMMERCIAL

## 2022-08-01 ENCOUNTER — RESULT REVIEW (OUTPATIENT)
Age: 42
End: 2022-08-01

## 2022-08-01 ENCOUNTER — APPOINTMENT (OUTPATIENT)
Dept: MAMMOGRAPHY | Facility: CLINIC | Age: 42
End: 2022-08-01

## 2022-08-01 DIAGNOSIS — Z98.891 HISTORY OF UTERINE SCAR FROM PREVIOUS SURGERY: Chronic | ICD-10-CM

## 2022-08-01 DIAGNOSIS — Z98.890 OTHER SPECIFIED POSTPROCEDURAL STATES: Chronic | ICD-10-CM

## 2022-08-01 DIAGNOSIS — Z30.431 ENCOUNTER FOR ROUTINE CHECKING OF INTRAUTERINE CONTRACEPTIVE DEVICE: ICD-10-CM

## 2022-08-01 DIAGNOSIS — Z00.8 ENCOUNTER FOR OTHER GENERAL EXAMINATION: ICD-10-CM

## 2022-08-01 PROCEDURE — 76641 ULTRASOUND BREAST COMPLETE: CPT | Mod: 26,50

## 2022-08-01 PROCEDURE — 76641 ULTRASOUND BREAST COMPLETE: CPT

## 2022-08-01 PROCEDURE — 77063 BREAST TOMOSYNTHESIS BI: CPT | Mod: 26

## 2022-08-01 PROCEDURE — 77067 SCR MAMMO BI INCL CAD: CPT | Mod: 26

## 2022-08-01 PROCEDURE — 77063 BREAST TOMOSYNTHESIS BI: CPT

## 2022-08-01 PROCEDURE — 77067 SCR MAMMO BI INCL CAD: CPT

## 2022-09-17 ENCOUNTER — NON-APPOINTMENT (OUTPATIENT)
Age: 42
End: 2022-09-17

## 2022-09-18 ENCOUNTER — EMERGENCY (EMERGENCY)
Facility: HOSPITAL | Age: 42
LOS: 1 days | Discharge: HOME | End: 2022-09-18
Attending: EMERGENCY MEDICINE | Admitting: EMERGENCY MEDICINE
Payer: COMMERCIAL

## 2022-09-18 VITALS
OXYGEN SATURATION: 99 % | RESPIRATION RATE: 20 BRPM | HEIGHT: 69 IN | WEIGHT: 235.01 LBS | HEART RATE: 87 BPM | DIASTOLIC BLOOD PRESSURE: 71 MMHG | TEMPERATURE: 97 F | SYSTOLIC BLOOD PRESSURE: 156 MMHG

## 2022-09-18 VITALS
HEART RATE: 80 BPM | DIASTOLIC BLOOD PRESSURE: 75 MMHG | SYSTOLIC BLOOD PRESSURE: 138 MMHG | OXYGEN SATURATION: 99 % | RESPIRATION RATE: 16 BRPM | TEMPERATURE: 98 F

## 2022-09-18 DIAGNOSIS — Z98.890 OTHER SPECIFIED POSTPROCEDURAL STATES: Chronic | ICD-10-CM

## 2022-09-18 DIAGNOSIS — Z98.891 HISTORY OF UTERINE SCAR FROM PREVIOUS SURGERY: Chronic | ICD-10-CM

## 2022-09-18 LAB
ALBUMIN SERPL ELPH-MCNC: 3.7 G/DL — SIGNIFICANT CHANGE UP (ref 3.3–5)
ALP SERPL-CCNC: 98 U/L — SIGNIFICANT CHANGE UP (ref 40–120)
ALT FLD-CCNC: 28 U/L — SIGNIFICANT CHANGE UP (ref 12–78)
AMYLASE P1 CFR SERPL: 72 U/L — SIGNIFICANT CHANGE UP (ref 25–125)
ANION GAP SERPL CALC-SCNC: 7 MMOL/L — SIGNIFICANT CHANGE UP (ref 5–17)
APPEARANCE UR: CLEAR — SIGNIFICANT CHANGE UP
AST SERPL-CCNC: 22 U/L — SIGNIFICANT CHANGE UP (ref 15–37)
BACTERIA # UR AUTO: ABNORMAL
BASOPHILS # BLD AUTO: 0.03 K/UL — SIGNIFICANT CHANGE UP (ref 0–0.2)
BASOPHILS NFR BLD AUTO: 0.3 % — SIGNIFICANT CHANGE UP (ref 0–2)
BILIRUB SERPL-MCNC: 0.6 MG/DL — SIGNIFICANT CHANGE UP (ref 0.2–1.2)
BILIRUB UR-MCNC: NEGATIVE — SIGNIFICANT CHANGE UP
BUN SERPL-MCNC: 13 MG/DL — SIGNIFICANT CHANGE UP (ref 7–23)
CALCIUM SERPL-MCNC: 9.1 MG/DL — SIGNIFICANT CHANGE UP (ref 8.5–10.1)
CHLORIDE SERPL-SCNC: 105 MMOL/L — SIGNIFICANT CHANGE UP (ref 96–108)
CO2 SERPL-SCNC: 27 MMOL/L — SIGNIFICANT CHANGE UP (ref 22–31)
COLOR SPEC: YELLOW — SIGNIFICANT CHANGE UP
CREAT SERPL-MCNC: 0.82 MG/DL — SIGNIFICANT CHANGE UP (ref 0.5–1.3)
DIFF PNL FLD: ABNORMAL
EGFR: 91 ML/MIN/1.73M2 — SIGNIFICANT CHANGE UP
EOSINOPHIL # BLD AUTO: 0.04 K/UL — SIGNIFICANT CHANGE UP (ref 0–0.5)
EOSINOPHIL NFR BLD AUTO: 0.4 % — SIGNIFICANT CHANGE UP (ref 0–6)
EPI CELLS # UR: SIGNIFICANT CHANGE UP
GLUCOSE SERPL-MCNC: 105 MG/DL — HIGH (ref 70–99)
GLUCOSE UR QL: NEGATIVE — SIGNIFICANT CHANGE UP
HCG SERPL-ACNC: <1 MIU/ML — SIGNIFICANT CHANGE UP
HCT VFR BLD CALC: 45.7 % — HIGH (ref 34.5–45)
HGB BLD-MCNC: 15.4 G/DL — SIGNIFICANT CHANGE UP (ref 11.5–15.5)
IMM GRANULOCYTES NFR BLD AUTO: 0.6 % — SIGNIFICANT CHANGE UP (ref 0–0.9)
KETONES UR-MCNC: ABNORMAL
LEUKOCYTE ESTERASE UR-ACNC: ABNORMAL
LIDOCAIN IGE QN: 143 U/L — SIGNIFICANT CHANGE UP (ref 73–393)
LYMPHOCYTES # BLD AUTO: 2.43 K/UL — SIGNIFICANT CHANGE UP (ref 1–3.3)
LYMPHOCYTES # BLD AUTO: 23.8 % — SIGNIFICANT CHANGE UP (ref 13–44)
MCHC RBC-ENTMCNC: 29.4 PG — SIGNIFICANT CHANGE UP (ref 27–34)
MCHC RBC-ENTMCNC: 33.7 GM/DL — SIGNIFICANT CHANGE UP (ref 32–36)
MCV RBC AUTO: 87.2 FL — SIGNIFICANT CHANGE UP (ref 80–100)
MONOCYTES # BLD AUTO: 0.52 K/UL — SIGNIFICANT CHANGE UP (ref 0–0.9)
MONOCYTES NFR BLD AUTO: 5.1 % — SIGNIFICANT CHANGE UP (ref 2–14)
NEUTROPHILS # BLD AUTO: 7.13 K/UL — SIGNIFICANT CHANGE UP (ref 1.8–7.4)
NEUTROPHILS NFR BLD AUTO: 69.8 % — SIGNIFICANT CHANGE UP (ref 43–77)
NITRITE UR-MCNC: POSITIVE
NRBC # BLD: 0 /100 WBCS — SIGNIFICANT CHANGE UP (ref 0–0)
PH UR: 7 — SIGNIFICANT CHANGE UP (ref 5–8)
PLATELET # BLD AUTO: 375 K/UL — SIGNIFICANT CHANGE UP (ref 150–400)
POTASSIUM SERPL-MCNC: 3.6 MMOL/L — SIGNIFICANT CHANGE UP (ref 3.5–5.3)
POTASSIUM SERPL-SCNC: 3.6 MMOL/L — SIGNIFICANT CHANGE UP (ref 3.5–5.3)
PROT SERPL-MCNC: 8.2 G/DL — SIGNIFICANT CHANGE UP (ref 6–8.3)
PROT UR-MCNC: 15
RBC # BLD: 5.24 M/UL — HIGH (ref 3.8–5.2)
RBC # FLD: 12.3 % — SIGNIFICANT CHANGE UP (ref 10.3–14.5)
RBC CASTS # UR COMP ASSIST: ABNORMAL /HPF (ref 0–4)
SODIUM SERPL-SCNC: 139 MMOL/L — SIGNIFICANT CHANGE UP (ref 135–145)
SP GR SPEC: 1.01 — SIGNIFICANT CHANGE UP (ref 1.01–1.02)
UROBILINOGEN FLD QL: NEGATIVE — SIGNIFICANT CHANGE UP
WBC # BLD: 10.21 K/UL — SIGNIFICANT CHANGE UP (ref 3.8–10.5)
WBC # FLD AUTO: 10.21 K/UL — SIGNIFICANT CHANGE UP (ref 3.8–10.5)
WBC UR QL: >50

## 2022-09-18 PROCEDURE — 96375 TX/PRO/DX INJ NEW DRUG ADDON: CPT

## 2022-09-18 PROCEDURE — 99284 EMERGENCY DEPT VISIT MOD MDM: CPT | Mod: 25

## 2022-09-18 PROCEDURE — G1004: CPT

## 2022-09-18 PROCEDURE — 36415 COLL VENOUS BLD VENIPUNCTURE: CPT

## 2022-09-18 PROCEDURE — 80053 COMPREHEN METABOLIC PANEL: CPT

## 2022-09-18 PROCEDURE — 82150 ASSAY OF AMYLASE: CPT

## 2022-09-18 PROCEDURE — 74177 CT ABD & PELVIS W/CONTRAST: CPT | Mod: ME

## 2022-09-18 PROCEDURE — 87186 SC STD MICRODIL/AGAR DIL: CPT

## 2022-09-18 PROCEDURE — 74177 CT ABD & PELVIS W/CONTRAST: CPT | Mod: 26,ME

## 2022-09-18 PROCEDURE — 96374 THER/PROPH/DIAG INJ IV PUSH: CPT | Mod: XU

## 2022-09-18 PROCEDURE — 81001 URINALYSIS AUTO W/SCOPE: CPT

## 2022-09-18 PROCEDURE — 85025 COMPLETE CBC W/AUTO DIFF WBC: CPT

## 2022-09-18 PROCEDURE — 84702 CHORIONIC GONADOTROPIN TEST: CPT

## 2022-09-18 PROCEDURE — 99285 EMERGENCY DEPT VISIT HI MDM: CPT

## 2022-09-18 PROCEDURE — 83690 ASSAY OF LIPASE: CPT

## 2022-09-18 PROCEDURE — 87086 URINE CULTURE/COLONY COUNT: CPT

## 2022-09-18 RX ORDER — ONDANSETRON 8 MG/1
4 TABLET, FILM COATED ORAL ONCE
Refills: 0 | Status: COMPLETED | OUTPATIENT
Start: 2022-09-18 | End: 2022-09-18

## 2022-09-18 RX ORDER — SODIUM CHLORIDE 9 MG/ML
1000 INJECTION INTRAMUSCULAR; INTRAVENOUS; SUBCUTANEOUS ONCE
Refills: 0 | Status: COMPLETED | OUTPATIENT
Start: 2022-09-18 | End: 2022-09-18

## 2022-09-18 RX ORDER — ONDANSETRON 8 MG/1
1 TABLET, FILM COATED ORAL
Qty: 12 | Refills: 0
Start: 2022-09-18 | End: 2022-09-20

## 2022-09-18 RX ORDER — FAMOTIDINE 10 MG/ML
20 INJECTION INTRAVENOUS ONCE
Refills: 0 | Status: COMPLETED | OUTPATIENT
Start: 2022-09-18 | End: 2022-09-18

## 2022-09-18 RX ORDER — CEFUROXIME AXETIL 250 MG
1 TABLET ORAL
Qty: 20 | Refills: 0
Start: 2022-09-18 | End: 2022-09-27

## 2022-09-18 RX ADMIN — SODIUM CHLORIDE 1000 MILLILITER(S): 9 INJECTION INTRAMUSCULAR; INTRAVENOUS; SUBCUTANEOUS at 14:18

## 2022-09-18 RX ADMIN — FAMOTIDINE 20 MILLIGRAM(S): 10 INJECTION INTRAVENOUS at 12:42

## 2022-09-18 RX ADMIN — ONDANSETRON 4 MILLIGRAM(S): 8 TABLET, FILM COATED ORAL at 12:41

## 2022-09-18 RX ADMIN — SODIUM CHLORIDE 1000 MILLILITER(S): 9 INJECTION INTRAMUSCULAR; INTRAVENOUS; SUBCUTANEOUS at 12:41

## 2022-09-18 NOTE — ED PROVIDER NOTE - NSFOLLOWUPINSTRUCTIONS_ED_ALL_ED_FT
Nausea and Vomiting, Adult      Nausea is feeling that you have an upset stomach and that you are about to vomit. Vomiting is when food in your stomach forcefully comes out of your mouth. Vomiting can make you feel weak. If you vomit, or if you are not able to drink enough fluids, you may not have enough water in your body (get dehydrated). If you do not have enough water in your body, you may:  •Feel tired.       •Feel thirsty.       •Have a dry mouth.      •Have cracked lips.      •Pee (urinate) less often.      Older adults and people with other diseases or a weak body defense system (immune system) are at higher risk for not having enough water in the body. If you feel like you may vomit or you vomit, it is important to follow instructions from your doctor about how to take care of yourself.      Follow these instructions at home:    Watch your symptoms for any changes. Tell your doctor about them.      Eating and drinking       A bottle of clear fruit juice and glass of water.       A sign showing that a person should not drink alcohol.     •Take an ORS (oral rehydration solution). This is a drink that is sold at pharmacies and stores.    •Drink clear fluids in small amounts as you are able, such as:  •Water.      •Ice chips.      •Fruit juice that has water added (diluted fruit juice).      •Low-calorie sports drinks.      •Eat bland, easy-to-digest foods in small amounts as you are able, such as:  •Bananas.      •Applesauce.      •Rice.      •Low-fat (lean) meats.      •Toast.      •Crackers.        •Avoid drinking fluids that have a lot of sugar or caffeine in them. This includes energy drinks, sports drinks, and soda.      •Avoid alcohol.      •Avoid spicy or fatty foods.      General instructions     •Take over-the-counter and prescription medicines only as told by your doctor.      •Drink enough fluid to keep your pee (urine) pale yellow.      •Wash your hands often with soap and water for at least 20 seconds. If you cannot use soap and water, use hand .      •Make sure that everyone in your home washes their hands well and often.      •Rest at home until you feel better.      •Watch your condition for any changes.      •Take slow and deep breaths when you feel like you may vomit.      •Keep all follow-up visits.        Contact a doctor if:    •Your symptoms get worse.      •You have new symptoms.      •You have a fever.      •You cannot drink fluids without vomiting.      •You feel like you may vomit for more than 2 days.      •You feel light-headed or dizzy.      •You have a headache.      •You have muscle cramps.      •You have a rash.      •You have pain while peeing.        Get help right away if:    •You have pain in your chest, neck, arm, or jaw.      •You feel very weak or you faint.      •You vomit again and again.      •You have vomit that is bright red or looks like black coffee grounds.      •You have bloody or black poop (stools) or poop that looks like tar.      •You have a very bad headache, a stiff neck, or both.      •You have very bad pain, cramping, or bloating in your belly (abdomen).      •You have trouble breathing.      •You are breathing very quickly.      •Your heart is beating very quickly.      •Your skin feels cold and clammy.      •You feel confused.    •You have signs of losing too much water in your body, such as:  •Dark pee, very little pee, or no pee.      •Cracked lips.      •Dry mouth.      •Sunken eyes.      •Sleepiness.      •Weakness.        These symptoms may be an emergency. Get help right away. Call 911.   • Do not wait to see if the symptoms will go away.       • Do not drive yourself to the hospital.         Summary    •Nausea is feeling that you have an upset stomach and that you are about to vomit. Vomiting is when food in your stomach comes out of your mouth.      •Follow instructions from your doctor about eating and drinking.      •Take over-the-counter and prescription medicines only as told by your doctor.      •Contact your doctor if your symptoms get worse or you have new symptoms.      •Keep all follow-up visits.      This information is not intended to replace advice given to you by your health care provider. Make sure you discuss any questions you have with your health care provider.      Document Revised: 06/24/2022 Document Reviewed: 06/24/2022    [employer] Patient Education © 2022 [employer]

## 2022-09-18 NOTE — ED PROVIDER NOTE - CLINICAL SUMMARY MEDICAL DECISION MAKING FREE TEXT BOX
Patient is a 42-year-old female who takes medication for weight loss that is known to cause nausea and vomiting.  Presents to the emergency room with 24 hours of nausea and vomiting and lower abdominal pain.  Initially presenting to urgent care this morning she was told she may have urinary tract infection but her urine revealed some hematuria.  Patient denies any fevers or chills, just persistent nausea and vomiting unable to keep any food down.  She has no chest pain or back pain.  No dysuria or urgency.  On evaluation: Well-developed well-nourished female no apparent distress.  HEENT is normal.  Neck is supple.  Cardiac exam is normal.  Lung exam is clear to auscultation.  Abdominal exam patient has a very soft abdomen with no CVA tenderness no right upper quadrant pain mild mid to left upper quadrant abdominal discomfort.  No rebound no guarding.  Musculoskeletal exam is normal.  Skin exam is normal.    Plan patient is a 42 year old nausea vomiting abdominal pain with hematuria- rule out renal colic ro dehydration rule out urinary tract infection, pregnancy, or other causes of abdominal pain nausea vomiting without diarrhea.

## 2022-09-18 NOTE — ED PROVIDER NOTE - PATIENT PORTAL LINK FT
You can access the FollowMyHealth Patient Portal offered by [facility] by registering at the following website: [email or web address]. By joining [facility]’s FollowMyHealth portal, you will also be able to view your health information using other applications (apps) compatible with our system.

## 2022-09-18 NOTE — ED ADULT TRIAGE NOTE - CHIEF COMPLAINT QUOTE
nausea / vomiting since yesterday.  Urgent care seems to think I have pylo (urine cloudy) but I am thinking it may be r/t a new medication which I started for weight loss (Weguvy).  I am not able to keep anything down.
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
## 2022-09-18 NOTE — ED PROVIDER NOTE - OBJECTIVE STATEMENT
41 yo female present to ED c/o nausea/vomiting that began yesterday after beginning new medication for weight loss called Weguvy. This is a common side affect of this drug. Patient went to urgent care this AM and they said she has a UTI, blood in urine and may have pyelonephritis causing her nausea and vomiting. Patient has IUD.  Denies fever, chills.   +abd pain, + nausea, + vomiting x 50 episodes or more.   + cloudy urine, denies dysuria, hematuria.   Denies CP or SOB.

## 2022-09-26 ENCOUNTER — NON-APPOINTMENT (OUTPATIENT)
Age: 42
End: 2022-09-26

## 2022-09-26 ENCOUNTER — APPOINTMENT (OUTPATIENT)
Dept: FAMILY MEDICINE | Facility: CLINIC | Age: 42
End: 2022-09-26

## 2022-09-26 VITALS
DIASTOLIC BLOOD PRESSURE: 78 MMHG | HEIGHT: 69 IN | SYSTOLIC BLOOD PRESSURE: 123 MMHG | HEART RATE: 77 BPM | TEMPERATURE: 97.9 F | WEIGHT: 235 LBS | OXYGEN SATURATION: 98 % | BODY MASS INDEX: 34.8 KG/M2

## 2022-09-26 DIAGNOSIS — Z23 ENCOUNTER FOR IMMUNIZATION: ICD-10-CM

## 2022-09-26 DIAGNOSIS — Z00.00 ENCOUNTER FOR GENERAL ADULT MEDICAL EXAMINATION W/OUT ABNORMAL FINDINGS: ICD-10-CM

## 2022-09-26 PROCEDURE — 90686 IIV4 VACC NO PRSV 0.5 ML IM: CPT

## 2022-09-26 PROCEDURE — 93000 ELECTROCARDIOGRAM COMPLETE: CPT

## 2022-09-26 PROCEDURE — 90471 IMMUNIZATION ADMIN: CPT

## 2022-09-26 PROCEDURE — 99396 PREV VISIT EST AGE 40-64: CPT | Mod: 25

## 2022-09-26 RX ORDER — MOMETASONE FUROATE MONOHYDRATE 50 UG/1
50 SPRAY, METERED NASAL
Refills: 0 | Status: DISCONTINUED | COMMUNITY
End: 2022-09-26

## 2022-09-26 RX ORDER — DEXTROAMPHETAMINE SACCHARATE, AMPHETAMINE ASPARTATE, DEXTROAMPHETAMINE SULFATE AND AMPHETAMINE SULFATE 1.25; 1.25; 1.25; 1.25 MG/1; MG/1; MG/1; MG/1
5 TABLET ORAL
Qty: 60 | Refills: 0 | Status: COMPLETED | COMMUNITY
Start: 2022-07-15

## 2022-09-26 RX ORDER — CEFUROXIME AXETIL 500 MG/1
500 TABLET ORAL
Qty: 20 | Refills: 0 | Status: COMPLETED | COMMUNITY
Start: 2022-09-18

## 2022-09-26 RX ORDER — AZELASTINE HYDROCHLORIDE 137 UG/1
0.1 SPRAY, METERED NASAL
Qty: 90 | Refills: 0 | Status: COMPLETED | COMMUNITY
Start: 2022-04-04

## 2022-09-26 RX ORDER — SEMAGLUTIDE 2.4 MG/.75ML
2.4 INJECTION, SOLUTION SUBCUTANEOUS
Qty: 3 | Refills: 0 | Status: COMPLETED | COMMUNITY
Start: 2022-04-18

## 2022-09-26 RX ORDER — ONDANSETRON 4 MG/1
4 TABLET ORAL
Qty: 12 | Refills: 0 | Status: COMPLETED | COMMUNITY
Start: 2022-09-18

## 2022-09-26 RX ORDER — CEFUROXIME AXETIL 500 MG/1
500 TABLET ORAL
Refills: 0 | Status: COMPLETED | COMMUNITY

## 2022-09-26 RX ORDER — DEXTROAMPHETAMINE SACCHARATE, AMPHETAMINE ASPARTATE MONOHYDRATE, DEXTROAMPHETAMINE SULFATE AND AMPHETAMINE SULFATE 1.25; 1.25; 1.25; 1.25 MG/1; MG/1; MG/1; MG/1
5 CAPSULE, EXTENDED RELEASE ORAL
Qty: 30 | Refills: 0 | Status: COMPLETED | COMMUNITY
Start: 2022-08-30

## 2022-09-26 RX ORDER — VENLAFAXINE 37.5 MG/1
37.5 TABLET, EXTENDED RELEASE ORAL
Qty: 30 | Refills: 0 | Status: COMPLETED | COMMUNITY
Start: 2022-09-01
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
## 2022-09-26 NOTE — REVIEW OF SYSTEMS
[Joint Pain] : joint pain [Fever] : no fever [Chills] : no chills [Fatigue] : no fatigue [Discharge] : no discharge [Pain] : no pain [Vision Problems] : no vision problems [Itching] : no itching [Earache] : no earache [Hearing Loss] : no hearing loss [Sore Throat] : no sore throat [Chest Pain] : no chest pain [Palpitations] : no palpitations [Orthopnea] : no orthopnea [Shortness Of Breath] : no shortness of breath [Wheezing] : no wheezing [Cough] : no cough [Abdominal Pain] : no abdominal pain [Nausea] : no nausea [Constipation] : no constipation [Diarrhea] : diarrhea [Vomiting] : no vomiting [Dysuria] : no dysuria [Incontinence] : no incontinence [Hematuria] : no hematuria [Joint Swelling] : no joint swelling [Muscle Weakness] : no muscle weakness [Muscle Pain] : no muscle pain [Itching] : no itching [Nail Changes] : no nail changes [Skin Rash] : no skin rash [Headache] : no headache [Dizziness] : no dizziness [Unsteady Walking] : no ataxia [Insomnia] : no insomnia [Anxiety] : no anxiety [Easy Bleeding] : no easy bleeding [Easy Bruising] : no easy bruising [FreeTextEntry9] : K knee pain

## 2022-09-26 NOTE — PHYSICAL EXAM
[No Acute Distress] : no acute distress [Well Nourished] : well nourished [Well Developed] : well developed [Normal Sclera/Conjunctiva] : normal sclera/conjunctiva [PERRL] : pupils equal round and reactive to light [EOMI] : extraocular movements intact [Normal Outer Ear/Nose] : the outer ears and nose were normal in appearance [Normal Oropharynx] : the oropharynx was normal [No Lymphadenopathy] : no lymphadenopathy [Thyroid Normal, No Nodules] : the thyroid was normal and there were no nodules present [No Respiratory Distress] : no respiratory distress  [No Accessory Muscle Use] : no accessory muscle use [Clear to Auscultation] : lungs were clear to auscultation bilaterally [Normal Rate] : normal rate  [Regular Rhythm] : with a regular rhythm [Normal S1, S2] : normal S1 and S2 [No Edema] : there was no peripheral edema [No Extremity Clubbing/Cyanosis] : no extremity clubbing/cyanosis [Soft] : abdomen soft [Non Tender] : non-tender [Non-distended] : non-distended [Normal Posterior Cervical Nodes] : no posterior cervical lymphadenopathy [Normal Anterior Cervical Nodes] : no anterior cervical lymphadenopathy [No Rash] : no rash [No Focal Deficits] : no focal deficits [Deep Tendon Reflexes (DTR)] : deep tendon reflexes were 2+ and symmetric [Normal Affect] : the affect was normal [Normal Insight/Judgement] : insight and judgment were intact

## 2022-09-26 NOTE — PAST MEDICAL HISTORY
[Menstruating] : menstruating [Regular Cycle Intervals] : have been regular [Total Preg ___] : G[unfilled] [Live Births ___] : P[unfilled]  [Premature ___] : Premature: [unfilled] [Less Bleeding] : the period was lighter than normal [Full Term ___] : Full Term: [unfilled] [de-identified] : Patient is bleeding less with IUD

## 2022-09-26 NOTE — ASSESSMENT
[FreeTextEntry1] : 42 year old female with PMHx of obesity, depression presents to clinic for annual physical exam.\par \par #CPE\par - EKG without acute ischemic changes\par - CBC, CMP, Lipid panel, A1C, TSH ordered\par - Will f/u with lab results\par - up to date with pap and mammo\par - flu shot given today\par \par #ADHD\par - Continue Adderall as prescribed by Neurologist\par \par #Depression\par - Continue Venlafaxine as prescribed by Psychiatrist\par \par #Obesity\par - Patient is no longer a member of [employer] program and is considering weight loss surgery \par - referral for Dr. Morales\par - did not tolerate weight loss medications

## 2022-09-26 NOTE — HISTORY OF PRESENT ILLNESS
[FreeTextEntry1] : Annual physical exam [de-identified] : 42 year old female with PMHx of obesity, depression, recently diagnosed ADHD, presents to clinic for annual physical exam. Patient had a cholecystectomy in May. Patient was a member of the [employer] weight loss program. She was prescribed Ozempic for 6 months, which was switched to Wegovy. Patient used 1 application of Wegovy injectable on Saturday after which she had intractable nausea and vomiting. She went to urgent care and was found to have a UTI and was started on antibiotics. She was told to present to the ED for management nausea/vomiting. She presented to the ED on Sunday and had a CT a/p which was normal except for an incidental finding of an ovarian cyst. Patient expresses concern regarding weight loss and is considering bariatric surgery. Patient follows with a psychiatrist and was referred to a Neurologist because she c/o difficulties with memory. She was diagnosed with ADHD a few months ago and started on Adderall. Today she denies fever/chills, headache, dizziness, chest pain, shortness of breath, abdominal pain, constipation/diarrhea.

## 2022-10-12 ENCOUNTER — APPOINTMENT (OUTPATIENT)
Dept: ENDOCRINOLOGY | Facility: CLINIC | Age: 42
End: 2022-10-12

## 2022-10-24 NOTE — ED PROVIDER NOTE - WR INTERPRETED BY 1
----- Message from Tangela De La Torre sent at 10/24/2022 11:28 AM CDT -----  Regarding: RE: ORDER/REFERRAL  Appears so looks like the GI Lab told him he needed to see the physician due to his medications etc prior to scheduling,    Thanks  ----- Message -----  From: Deann Hurst RN  Sent: 10/24/2022  11:18 AM CDT  To: Tangela De La Torre  Subject: FW: ORDER/REFERRAL                               Is it for colonoscopy?  ----- Message -----  From: Tangela De La Torre  Sent: 10/24/2022   8:51 AM CDT  To: , #  Subject: ORDER/REFERRAL                                   Patient has an appointment with Dr Severino on 10/25/22. Has a HMO so will need a referral from [facility] Medicare plan. Thank you    Ilsa        
Real Ulloa

## 2022-10-30 ENCOUNTER — NON-APPOINTMENT (OUTPATIENT)
Age: 42
End: 2022-10-30

## 2022-11-03 ENCOUNTER — APPOINTMENT (OUTPATIENT)
Dept: OTOLARYNGOLOGY | Facility: CLINIC | Age: 42
End: 2022-11-03

## 2022-11-03 ENCOUNTER — NON-APPOINTMENT (OUTPATIENT)
Age: 42
End: 2022-11-03

## 2022-11-03 VITALS — BODY MASS INDEX: 34.8 KG/M2 | WEIGHT: 235 LBS | HEIGHT: 69 IN

## 2022-11-03 VITALS — DIASTOLIC BLOOD PRESSURE: 73 MMHG | HEART RATE: 88 BPM | SYSTOLIC BLOOD PRESSURE: 141 MMHG

## 2022-11-03 DIAGNOSIS — H66.91 OTITIS MEDIA, UNSPECIFIED, RIGHT EAR: ICD-10-CM

## 2022-11-03 DIAGNOSIS — J01.90 ACUTE SINUSITIS, UNSPECIFIED: ICD-10-CM

## 2022-11-03 DIAGNOSIS — J02.9 ACUTE PHARYNGITIS, UNSPECIFIED: ICD-10-CM

## 2022-11-03 PROCEDURE — 99203 OFFICE O/P NEW LOW 30 MIN: CPT

## 2022-11-03 NOTE — HISTORY OF PRESENT ILLNESS
[de-identified] : 42 yr old female c/o sore throat and ear pressure w pain AU, yellow mucous\par both of her children were sick recently\par went to Urgent Care tx bromphed, ipratropium\par \par +hx tonsillitis, strep, otitis, sinusitis\par +hx mono in her 20's, right  tonsil has been enlarged since then

## 2022-11-03 NOTE — REVIEW OF SYSTEMS
[Ear Pain] : ear pain [Throat Pain] : throat pain [Throat Dryness] : throat dryness [Throat Itching] : throat itching [Cough] : cough [Anxiety] : anxiety [Depression] : depression [Negative] : Heme/Lymph [de-identified] : panic

## 2022-11-03 NOTE — ASSESSMENT
[FreeTextEntry1] : d/c ipratropium\par \par Afrin for 3d, sinus rinse, Flonase\par rx Augmentin\par \par f/u prn failure to improve

## 2022-11-03 NOTE — PHYSICAL EXAM
[Normal] : gums are normal [de-identified] : clear AS, erythema AD no effusion [de-identified] : erythema [de-identified] : yellow bilat [de-identified] : 2+ right, 1+ left [de-identified] : erythema of post phar wall w yellow mucous in OP

## 2022-12-15 ENCOUNTER — APPOINTMENT (OUTPATIENT)
Dept: OBGYN | Facility: CLINIC | Age: 42
End: 2022-12-15

## 2022-12-15 VITALS
BODY MASS INDEX: 37.64 KG/M2 | DIASTOLIC BLOOD PRESSURE: 80 MMHG | SYSTOLIC BLOOD PRESSURE: 142 MMHG | WEIGHT: 254.13 LBS | HEIGHT: 69 IN

## 2022-12-15 DIAGNOSIS — N64.4 MASTODYNIA: ICD-10-CM

## 2022-12-15 PROCEDURE — 99214 OFFICE O/P EST MOD 30 MIN: CPT

## 2022-12-15 NOTE — HISTORY OF PRESENT ILLNESS
[FreeTextEntry1] : 43yo Mirena IUd for BC w amenorrhe though intermittent PMS symptoms and h/o ovarian cyst in recent past\par Yesterday noted pain in  mid, periariola right breast\par no F/c\par no nipple discharge\par no trauma that she can recall [Mammogramdate] : 7/2022

## 2022-12-15 NOTE — PLAN
[FreeTextEntry1] :  pain improving w incidental folliculitis that pt will watch for spread\par \par f/u imaging breast, low susp\par \par incidental 2-3 cm ovarian cyst in September--asymptomatic\par nothing to do\par \par

## 2022-12-15 NOTE — REVIEW OF SYSTEMS
[Breast Pain] : breast pain [Breast Lump] : no breast lump [Nipple Changes] : no nipple changes [Nipple Discharge] : no nipple discharge [Skin Lesion on Breast] : skin lesion on breast [Negative] : Heme/Lymph

## 2022-12-15 NOTE — PHYSICAL EXAM
[Chaperone Declined] : Patient declined chaperone [Appropriately responsive] : appropriately responsive [Examination Of The Breasts] : a normal appearance [Normal] : normal [No Masses] : no breast masses were palpable

## 2022-12-19 ENCOUNTER — APPOINTMENT (OUTPATIENT)
Dept: MAMMOGRAPHY | Facility: CLINIC | Age: 42
End: 2022-12-19

## 2022-12-19 ENCOUNTER — RESULT REVIEW (OUTPATIENT)
Age: 42
End: 2022-12-19

## 2022-12-19 ENCOUNTER — OUTPATIENT (OUTPATIENT)
Dept: OUTPATIENT SERVICES | Facility: HOSPITAL | Age: 42
LOS: 1 days | End: 2022-12-19
Payer: COMMERCIAL

## 2022-12-19 ENCOUNTER — APPOINTMENT (OUTPATIENT)
Dept: ULTRASOUND IMAGING | Facility: CLINIC | Age: 42
End: 2022-12-19

## 2022-12-19 DIAGNOSIS — Z00.8 ENCOUNTER FOR OTHER GENERAL EXAMINATION: ICD-10-CM

## 2022-12-19 DIAGNOSIS — N64.4 MASTODYNIA: ICD-10-CM

## 2022-12-19 DIAGNOSIS — Z98.891 HISTORY OF UTERINE SCAR FROM PREVIOUS SURGERY: Chronic | ICD-10-CM

## 2022-12-19 DIAGNOSIS — Z98.890 OTHER SPECIFIED POSTPROCEDURAL STATES: Chronic | ICD-10-CM

## 2022-12-19 PROCEDURE — 76642 ULTRASOUND BREAST LIMITED: CPT | Mod: 26,RT

## 2022-12-19 PROCEDURE — 76642 ULTRASOUND BREAST LIMITED: CPT

## 2022-12-19 PROCEDURE — G0279: CPT | Mod: 26

## 2022-12-19 PROCEDURE — G0279: CPT

## 2022-12-19 PROCEDURE — 77065 DX MAMMO INCL CAD UNI: CPT | Mod: 26,RT

## 2022-12-19 PROCEDURE — 77065 DX MAMMO INCL CAD UNI: CPT

## 2022-12-20 ENCOUNTER — APPOINTMENT (OUTPATIENT)
Dept: OTOLARYNGOLOGY | Facility: CLINIC | Age: 42
End: 2022-12-20

## 2022-12-20 VITALS
HEART RATE: 87 BPM | DIASTOLIC BLOOD PRESSURE: 86 MMHG | HEIGHT: 69 IN | WEIGHT: 240 LBS | SYSTOLIC BLOOD PRESSURE: 125 MMHG | BODY MASS INDEX: 35.55 KG/M2

## 2022-12-20 DIAGNOSIS — J06.9 ACUTE UPPER RESPIRATORY INFECTION, UNSPECIFIED: ICD-10-CM

## 2022-12-20 PROCEDURE — 99213 OFFICE O/P EST LOW 20 MIN: CPT

## 2022-12-20 RX ORDER — BROMPHENIRAMINE MALEATE, PSEUDOEPHEDRINE HYDROCHLORIDE, 2; 30; 10 MG/5ML; MG/5ML; MG/5ML
30-2-10 SYRUP ORAL
Qty: 200 | Refills: 0 | Status: DISCONTINUED | COMMUNITY
Start: 2022-10-31 | End: 2022-12-20

## 2022-12-20 RX ORDER — IPRATROPIUM BROMIDE 42 UG/1
0.06 SPRAY NASAL
Qty: 15 | Refills: 0 | Status: DISCONTINUED | COMMUNITY
Start: 2022-10-31 | End: 2022-12-20

## 2022-12-20 RX ORDER — AMOXICILLIN AND CLAVULANATE POTASSIUM 875; 125 MG/1; MG/1
875-125 TABLET, COATED ORAL
Qty: 20 | Refills: 0 | Status: DISCONTINUED | COMMUNITY
Start: 2022-11-03 | End: 2022-12-20

## 2022-12-20 NOTE — HISTORY OF PRESENT ILLNESS
[de-identified] : 42 yr old female's son has otitis.  She c/o pain and clog AS with nasal congestion for 5 days. No discolored mucous\par started flonase\par +AOM AD last month\par \par +hx tonsillitis, strep, otitis, sinusitis\par +enlarged right tonsil since mono in her 20's

## 2022-12-20 NOTE — PHYSICAL EXAM
[Normal] : mucosa is normal [Midline] : trachea located in midline position [de-identified] : enlarged with erythema [de-identified] : pale [de-identified] : 2+ right, 1+ left

## 2023-01-26 NOTE — PATIENT PROFILE OB - EDD BY ULTRASOUND, OB PROFILE
Quality 130: Documentation Of Current Medications In The Medical Record: Current Medications Documented Detail Level: Detailed 26-Dec-2017

## 2023-03-06 ENCOUNTER — NON-APPOINTMENT (OUTPATIENT)
Age: 43
End: 2023-03-06

## 2023-09-18 ENCOUNTER — APPOINTMENT (OUTPATIENT)
Dept: FAMILY MEDICINE | Facility: CLINIC | Age: 43
End: 2023-09-18
Payer: COMMERCIAL

## 2023-09-18 VITALS
BODY MASS INDEX: 38.36 KG/M2 | OXYGEN SATURATION: 98 % | HEIGHT: 69 IN | WEIGHT: 259 LBS | DIASTOLIC BLOOD PRESSURE: 88 MMHG | SYSTOLIC BLOOD PRESSURE: 134 MMHG | HEART RATE: 100 BPM

## 2023-09-18 DIAGNOSIS — F41.9 ANXIETY DISORDER, UNSPECIFIED: ICD-10-CM

## 2023-09-18 DIAGNOSIS — F90.9 ATTENTION-DEFICIT HYPERACTIVITY DISORDER, UNSPECIFIED TYPE: ICD-10-CM

## 2023-09-18 DIAGNOSIS — F32.A ANXIETY DISORDER, UNSPECIFIED: ICD-10-CM

## 2023-09-18 DIAGNOSIS — Z01.818 ENCOUNTER FOR OTHER PREPROCEDURAL EXAMINATION: ICD-10-CM

## 2023-09-18 DIAGNOSIS — E66.9 OBESITY, UNSPECIFIED: ICD-10-CM

## 2023-09-18 PROCEDURE — 99214 OFFICE O/P EST MOD 30 MIN: CPT | Mod: 25

## 2023-09-18 RX ORDER — DEXTROAMPHETAMINE SULFATE, DEXTROAMPHETAMINE SACCHARATE, AMPHETAMINE SULFATE AND AMPHETAMINE ASPARTATE 1.25; 1.25; 1.25; 1.25 MG/1; MG/1; MG/1; MG/1
5 CAPSULE, EXTENDED RELEASE ORAL
Refills: 0 | Status: DISCONTINUED | COMMUNITY
End: 2023-09-18

## 2023-09-18 RX ORDER — VENLAFAXINE HYDROCHLORIDE 37.5 MG/1
37.5 CAPSULE, EXTENDED RELEASE ORAL
Refills: 0 | Status: COMPLETED | COMMUNITY
End: 2023-09-18

## 2023-09-18 RX ORDER — AMOXICILLIN AND CLAVULANATE POTASSIUM 875; 125 MG/1; MG/1
875-125 TABLET, COATED ORAL
Qty: 20 | Refills: 0 | Status: COMPLETED | COMMUNITY
Start: 2022-12-20 | End: 2023-09-18

## 2023-09-18 RX ORDER — FLUTICASONE PROPIONATE 50 UG/1
50 SPRAY, METERED NASAL
Qty: 3 | Refills: 3 | Status: COMPLETED | COMMUNITY
Start: 2022-11-03 | End: 2023-09-18

## 2023-09-18 RX ORDER — MELOXICAM 15 MG/1
15 TABLET ORAL
Qty: 30 | Refills: 0 | Status: COMPLETED | COMMUNITY
Start: 2022-08-19 | End: 2023-09-18

## 2023-09-19 RX ORDER — BUPROPION HYDROCHLORIDE 150 MG/1
150 TABLET, EXTENDED RELEASE ORAL
Qty: 90 | Refills: 0 | Status: ACTIVE | COMMUNITY
Start: 2023-09-06

## 2023-09-19 RX ORDER — VENLAFAXINE HYDROCHLORIDE 75 MG/1
75 CAPSULE, EXTENDED RELEASE ORAL
Qty: 90 | Refills: 0 | Status: ACTIVE | COMMUNITY
Start: 2023-08-31

## 2023-09-19 RX ORDER — DEXTROAMPHETAMINE SACCHARATE, AMPHETAMINE ASPARTATE MONOHYDRATE, DEXTROAMPHETAMINE SULFATE AND AMPHETAMINE SULFATE 2.5; 2.5; 2.5; 2.5 MG/1; MG/1; MG/1; MG/1
10 CAPSULE, EXTENDED RELEASE ORAL
Qty: 30 | Refills: 0 | Status: ACTIVE | COMMUNITY
Start: 2023-09-07

## 2023-10-05 NOTE — PRE-ANESTHESIA EVALUATION ADULT - NSANTHINDVINFOSD_GEN_ALL_CORE
Health Maintenance Due   Topic Date Due   • Diabetes Eye Exam  12/18/2020   • Influenza Vaccine (1) 09/01/2023   • Diabetes Foot Exam  09/06/2023   • Diabetes A1C  10/26/2023       Patient is due for the topics as listed above and wishes to proceed with them. Orders placed for Immunization(s) Influenza.     patient

## 2024-02-22 NOTE — ED PROVIDER NOTE - NSCAREINITIATED _GEN_ER
----- Message from LORI Beverly sent at 2/22/2024 12:32 PM CST -----  Hello,  Please call and inform patient she tested POSITIVE for bacterial vaginosis. An order for Metronidazole 500 mg BID x 7 days will be sent to pharmacy tor treatment. She should follow up with her PCP in 2-4 weeks for retesting or as needed.   Thanks,  LORI Vinson     Gina Saini)

## 2024-04-02 ENCOUNTER — NON-APPOINTMENT (OUTPATIENT)
Age: 44
End: 2024-04-02

## 2024-08-30 ENCOUNTER — NON-APPOINTMENT (OUTPATIENT)
Age: 44
End: 2024-08-30

## 2024-09-22 ENCOUNTER — NON-APPOINTMENT (OUTPATIENT)
Age: 44
End: 2024-09-22

## 2025-01-17 NOTE — PATIENT PROFILE ADULT - FUNCTIONAL SCREEN CURRENT LEVEL: SWALLOWING (IF SCORE 2 OR MORE FOR ANY ITEM, CONSULT REHAB SERVICES), MLM)
Mediterranean Diet      This guide has been prepared for your use by registered dietitians. If you have questions or concerns, please call the nearest [facility] facility to contact a dietitian. Diet counseling is available to discuss your specific needs.    What is the Mediterranean Diet?    The Mediterranean region includes three continents and more than 15 countries. People from [place], [place], [place], [place] and other Mediterranean  countries traditionally eat a diet consisting mainly of grains, legumes, fruits, vegetables, nuts, seeds and olive oil.  Key components of the Mediterranean Diet include:   Eating foods primarily from plant sources, such asfruits and vegetables, whole grains, legumes, nutsand seeds   A variety of minimally processed and, wherever possible, seasonally fresh and locally grown foods   Replacing butter and margarine with healthy fats,such as olive oil   Using herbs and spices instead of salt to flavor foods   Eating fish and poultry at least twice weekly   Daily consumption of low to moderate amounts of cheese and yogurt   No more than four eggs per week (including those used in cooking and baking)   Fresh fruit as the typical daily dessert; sweets with a significant amount of sugar and saturated fat consumed no more than a few times a month   Red meat is only eaten a few times per month in small portions   Moderate consumption of wine, normally with meals; about one to two glasses per day for men and one glass per day for women, however, individuals should only drink wine if they are medically able to do so, and should ask their doctors for more information.   Sharing healthy meals as a celebration with family and friends   Regular physical activity at a level that promotes a healthy weight, fitness and well-being    Why should I follow the Mediterranean Diet?    Researchers have found that a higher intake of monounsaturated and polyunsaturated fat and lower intake of saturated fat and  trans-fatty acids is associated with a decreased risk of heart disease and cancer.   Saturated fat tends to be hard fat, such as the fat in red meat, butter, cheese, whole milk and ice cream.   Trans-fatty acids are formed when vegetable oils are hardened or hydrogenated to form margarine or shortening.   Monounsaturated and polyunsaturated fats are liquid at room temperature and are found in plant sources. The fat in olive oil is monounsaturated.    What is the Mediterranean Diet Pyramid?    The Mediterranean Diet Pyramid emphasizes making the base foundation of your meals grains (mostly whole), fruits, vegetables, legumes, nuts, herbs, spices and olive oil. Fish, poultry, eggs, cheese and yogurt are consumed regularly but in low to moderate amounts. Red meat and sweets are listed at the top of the pyramid meaning they should be eaten only a few times a month. The Mediterranean Diet also recommends drinking adequate amounts of water each day and the use of red wine in moderation.    Is the Mediterranean Diet good for everyone?    The pyramid describes a diet for most healthy adults. If overweight or obese, focus should then be on eating more grains, fruits, vegetables and legumes, and less meat, sweets and total fat. Some type of physical activity, such as walking or stair climbing, should be included every day. [id number] (04/12) ©[facility] Nutrition > Special Diets  Mediterranean Diet,    Tips for Following the Mediterranean Diet    Bread, potatoes, pasta, rice and other cereals   Build your diet from a base of grains - rice, pasta, couscous   Learn to cook couscous, bulgur, barley, polenta and other interesting grains   Add legumes - chickpeas, lentils, peanuts, beans and peas - to your meals   Choose whole-grain varieties most often for extra fiber.    Fruits and vegetables  10 SERVINGS TOTAL DAILY.  A serving is 1/2 cup.     Choose a wide variety of fruits and vegetables, various colors.    Top pasta, rice or even pizza  with steamed or fresh vegetables   Add popular Mediterranean ingredients like garlic, fresh herbs, chopped onions, richa and grated lemon or orange zest to dishes   Try baking garlic until it is soft and tran. It adds a creamy, rich flavor to pasta, rice and bean dishes.   Make fresh fruit your daily dessert     Fish, poultry, eggs and meat   Limit fatty meat - it is high in saturated fat   Choose lower fat alternatives such as poultry, fish or lean red meat   Eat low to moderate amounts of fish, poultry and eggs a few times a week   Eat fish, including oily fish such as mackerel, herring, tuna and salmon at least three times a week   Limit red meat to only a few times per month; slice it in stir-fried dishes so it goes a long way    Milk and dairy products   Eat low to moderate amounts of cheese and yogurt every day   Choose lower fat varieties such as low fat yogurt and skim milk   Use a sprinkling or thin shaving of sharp or imported cheese rather than eating more of less flavorful cheese    Nuts and seeds   Choose a variety of nuts and seeds - almonds, hazelnuts, pistachios, walnuts and sesame seeds (1 ounce daily)   Add a small amount of chopped nuts to salads, pasta or other grain dishes for a crunchy texture    Fats   Substitute olive oil, rich in monounsaturated fat, for butter and other animal fats, which are high in saturated fats (3 tablespoons of olive oil mixed in food daily).   Use olive oil for salad dressings, marinades, sautéing and stir-frying meats, fish and vegetables   Limit foods that are fried to only a few times a month   Drizzle small amounts of olive oil on bread, rice and pastas    Sweets   Eat only small amounts of foods that contain large amounts of sugar and saturated fat, such as cakes and pastries; limit these foods to only a few times a month    Wine   Drink red wine in moderation - one to two 5-ounce glasses per day for men, one 5-ounce glass for women   Avoid wine if it would  put you or others at risk, including during pregnancy and before driving    Recommended Web sites for Mediterranean Recipes; Allrecipes.com, www.CinemaNow.com (Search for “Mediterranean” on each site)  A registered dietitian can help, Diet counseling with a registered dietitian may include information on:   Label reading, shopping, food preparation, adjusting recipes   Dining out  Tips for Following the Mediterranean Diet  For a list of [facility] facilities with a dietitian, please call [facility] toll free at [phone] (04/12) [facility] Nutrition > Special Diets  The information presented is intended for general information and educational purposes. It is not intended to replace the advice of your health care provider. Contact your health care provider if you believe you have a health problem.  [facility] is a not-for-profit health care provider and a national leader in efforts to improve the quality of health care.               0 = swallows foods/liquids without difficulty

## 2025-02-13 NOTE — BRIEF OPERATIVE NOTE - NSICDXBRIEFPREOP_GEN_ALL_CORE_FT
previous CVA with right-sided weakness.   PRE-OP DIAGNOSIS:  Suspected fetal anomaly, antepartum 21-Apr-2020 13:02:28  Kenna Peres PRE-OP DIAGNOSIS:  Suspected fetal anomaly, antepartum 21-Apr-2020 13:02:28 1.  Trisomy 21 Kenna Peres PRE-OP DIAGNOSIS:  Suspected fetal anomaly, antepartum 21-Apr-2020 13:02:28 1.  Trisomy 21  2. IUP @ 12 6/7 weeks Kenna Peres

## (undated) DEVICE — D HELP - CLEARVIEW CLEARIFY SYSTEM

## (undated) DEVICE — Device

## (undated) DEVICE — SOL IRR POUR H2O 1000ML

## (undated) DEVICE — SUCTION YANKAUER NO CONTROL VENT

## (undated) DEVICE — SHEARS COVIDIEN ENDO SHEAR 5MM X 31CM W UNIPOLAR CAUTERY

## (undated) DEVICE — DRSG DERMABOND 0.7ML

## (undated) DEVICE — PLV-STRYKER LAPAROSCOPE 10MM 30 DEGREE: Type: DURABLE MEDICAL EQUIPMENT

## (undated) DEVICE — TROCAR COVIDIEN VERSAPORT BLADELESS OPTICAL 5MM STANDARD

## (undated) DEVICE — PLV/PSP-SUCTION IRRIGATOR STRYKER: Type: DURABLE MEDICAL EQUIPMENT

## (undated) DEVICE — ENDOCATCH 10MM SPECIMEN POUCH

## (undated) DEVICE — BLADE SCALPEL SAFETYLOCK #15

## (undated) DEVICE — VENODYNE/SCD SLEEVE CALF MEDIUM

## (undated) DEVICE — SPONGE ENDO PEANUT 5MM

## (undated) DEVICE — PLV-STRYKER LAPAROSCOPE 5MM 30 DEGREE: Type: DURABLE MEDICAL EQUIPMENT

## (undated) DEVICE — GOWN XL W TOWEL

## (undated) DEVICE — DRAPE 3/4 SHEET W REINFORCEMENT 56X77"

## (undated) DEVICE — NDL HYPO REGULAR BEVEL 25G X 1.5" (BLUE)

## (undated) DEVICE — DRAPE TOWEL BLUE 17" X 24"

## (undated) DEVICE — TROCAR COVIDIEN VERSAONE BLUNT TIP HASSAN 12MM

## (undated) DEVICE — TUBING STRYKER PNEUMOCLEAR HIGH FLOW

## (undated) DEVICE — TROCAR COVIDIEN VERSAONE FIXATION CANNULA 5MM

## (undated) DEVICE — PACK GENERAL LAPAROSCOPY

## (undated) DEVICE — WARMING BLANKET UPPER ADULT

## (undated) DEVICE — VENODYNE/SCD SLEEVE CALF LARGE

## (undated) DEVICE — SOL IRR BAG NS 0.9% 3000ML

## (undated) DEVICE — TROCAR COVIDIEN VERSAPORT BLADELESS OPTICAL 11MM STANDARD

## (undated) DEVICE — SUT MONOCRYL 4-0 27" PS-2 UNDYED

## (undated) DEVICE — PLV-SCD MACHINE: Type: DURABLE MEDICAL EQUIPMENT

## (undated) DEVICE — GLV 7 PROTEXIS (WHITE)

## (undated) DEVICE — ELCTR BOVIE TIP BLADE INSULATED 2.75" EDGE

## (undated) DEVICE — SUT POLYSORB 0 30" GU-46